# Patient Record
Sex: FEMALE | Race: WHITE | Employment: UNEMPLOYED | URBAN - NONMETROPOLITAN AREA
[De-identification: names, ages, dates, MRNs, and addresses within clinical notes are randomized per-mention and may not be internally consistent; named-entity substitution may affect disease eponyms.]

---

## 2018-04-06 ENCOUNTER — NURSE TRIAGE (OUTPATIENT)
Dept: ADMINISTRATIVE | Age: 48
End: 2018-04-06

## 2018-04-06 ENCOUNTER — HOSPITAL ENCOUNTER (EMERGENCY)
Age: 48
Discharge: HOME | End: 2018-04-06
Payer: MEDICAID

## 2018-04-06 VITALS
HEIGHT: 60 IN | DIASTOLIC BLOOD PRESSURE: 80 MMHG | SYSTOLIC BLOOD PRESSURE: 143 MMHG | HEART RATE: 82 BPM | OXYGEN SATURATION: 98 % | BODY MASS INDEX: 53.01 KG/M2 | RESPIRATION RATE: 18 BRPM | TEMPERATURE: 98.3 F | WEIGHT: 270 LBS

## 2018-04-06 DIAGNOSIS — S05.8X1A ABRASION OF SCLERA OF RIGHT EYE, INITIAL ENCOUNTER: Primary | ICD-10-CM

## 2018-04-06 PROCEDURE — 99202 OFFICE O/P NEW SF 15 MIN: CPT | Performed by: NURSE PRACTITIONER

## 2018-04-06 PROCEDURE — 99212 OFFICE O/P EST SF 10 MIN: CPT

## 2018-04-06 RX ORDER — ERYTHROMYCIN 5 MG/G
0.5 OINTMENT OPHTHALMIC 3 TIMES DAILY
Qty: 1 TUBE | Refills: 0 | Status: SHIPPED | OUTPATIENT
Start: 2018-04-06 | End: 2018-04-13

## 2018-04-06 ASSESSMENT — ENCOUNTER SYMPTOMS
NAUSEA: 0
EYE REDNESS: 1
BLURRED VISION: 1
PHOTOPHOBIA: 0
APNEA: 0
EYE DISCHARGE: 1
EYE WATERING: 1
EYE INFLAMMATION: 0
COUGH: 0
CRUSTING: 0
EYE PAIN: 1
DOUBLE VISION: 0
WHEEZING: 0
VOMITING: 0
PERI-ORBITAL EDEMA: 1
SHORTNESS OF BREATH: 0
BLIND SPOTS: 0
EYE ITCHING: 0
CHEST TIGHTNESS: 0
CHOKING: 0
STRIDOR: 0

## 2018-04-06 ASSESSMENT — PAIN SCALES - GENERAL: PAINLEVEL_OUTOF10: 9

## 2018-04-06 ASSESSMENT — PAIN DESCRIPTION - LOCATION: LOCATION: EYE

## 2018-04-06 ASSESSMENT — PAIN DESCRIPTION - ORIENTATION: ORIENTATION: RIGHT

## 2018-04-13 ENCOUNTER — HOSPITAL ENCOUNTER (EMERGENCY)
Age: 48
Discharge: HOME | End: 2018-04-13
Payer: MEDICAID

## 2018-04-13 VITALS
SYSTOLIC BLOOD PRESSURE: 167 MMHG | RESPIRATION RATE: 18 BRPM | BODY MASS INDEX: 53.01 KG/M2 | WEIGHT: 270 LBS | OXYGEN SATURATION: 97 % | DIASTOLIC BLOOD PRESSURE: 90 MMHG | HEART RATE: 72 BPM | TEMPERATURE: 98.3 F | HEIGHT: 60 IN

## 2018-04-13 DIAGNOSIS — J30.9 ALLERGIC SINUSITIS: Primary | ICD-10-CM

## 2018-04-13 PROCEDURE — 99213 OFFICE O/P EST LOW 20 MIN: CPT

## 2018-04-13 PROCEDURE — 99213 OFFICE O/P EST LOW 20 MIN: CPT | Performed by: NURSE PRACTITIONER

## 2018-04-13 RX ORDER — AZITHROMYCIN 250 MG/1
TABLET, FILM COATED ORAL
Qty: 6 TABLET | Refills: 0 | Status: SHIPPED | OUTPATIENT
Start: 2018-04-13 | End: 2018-06-09 | Stop reason: ALTCHOICE

## 2018-04-13 RX ORDER — PREDNISONE 10 MG/1
TABLET ORAL
Qty: 30 TABLET | Refills: 0 | Status: SHIPPED | OUTPATIENT
Start: 2018-04-13 | End: 2018-04-23

## 2018-04-13 ASSESSMENT — PAIN DESCRIPTION - FREQUENCY: FREQUENCY: CONTINUOUS

## 2018-04-13 ASSESSMENT — PAIN DESCRIPTION - ORIENTATION: ORIENTATION: LEFT

## 2018-04-13 ASSESSMENT — PAIN SCALES - GENERAL: PAINLEVEL_OUTOF10: 2

## 2018-04-13 ASSESSMENT — ENCOUNTER SYMPTOMS: COUGH: 0

## 2018-04-13 ASSESSMENT — PAIN DESCRIPTION - PAIN TYPE: TYPE: ACUTE PAIN

## 2018-04-13 ASSESSMENT — PAIN DESCRIPTION - DESCRIPTORS: DESCRIPTORS: THROBBING

## 2018-06-09 ENCOUNTER — HOSPITAL ENCOUNTER (EMERGENCY)
Age: 48
Discharge: HOME | End: 2018-06-09
Attending: EMERGENCY MEDICINE
Payer: MEDICAID

## 2018-06-09 VITALS
BODY MASS INDEX: 54.43 KG/M2 | HEART RATE: 81 BPM | DIASTOLIC BLOOD PRESSURE: 71 MMHG | TEMPERATURE: 98.4 F | RESPIRATION RATE: 18 BRPM | OXYGEN SATURATION: 95 % | HEIGHT: 59 IN | SYSTOLIC BLOOD PRESSURE: 139 MMHG | WEIGHT: 270 LBS

## 2018-06-09 DIAGNOSIS — J01.00 ACUTE MAXILLARY SINUSITIS, RECURRENCE NOT SPECIFIED: Primary | ICD-10-CM

## 2018-06-09 DIAGNOSIS — J04.0 ACUTE LARYNGITIS: ICD-10-CM

## 2018-06-09 DIAGNOSIS — J20.9 ACUTE BRONCHITIS DUE TO INFECTION: ICD-10-CM

## 2018-06-09 PROCEDURE — 99213 OFFICE O/P EST LOW 20 MIN: CPT | Performed by: EMERGENCY MEDICINE

## 2018-06-09 PROCEDURE — 99212 OFFICE O/P EST SF 10 MIN: CPT

## 2018-06-09 RX ORDER — DOXYCYCLINE HYCLATE 100 MG
100 TABLET ORAL 2 TIMES DAILY
Qty: 14 TABLET | Refills: 0 | Status: SHIPPED | OUTPATIENT
Start: 2018-06-09 | End: 2018-06-16

## 2018-06-09 RX ORDER — PROMETHAZINE HYDROCHLORIDE AND CODEINE PHOSPHATE 6.25; 1 MG/5ML; MG/5ML
5 SYRUP ORAL 4 TIMES DAILY PRN
Qty: 120 ML | Refills: 0 | Status: SHIPPED | OUTPATIENT
Start: 2018-06-09 | End: 2018-06-14

## 2018-06-09 ASSESSMENT — ENCOUNTER SYMPTOMS
NAUSEA: 0
CONSTIPATION: 0
SINUS PAIN: 1
SORE THROAT: 1
SHORTNESS OF BREATH: 0
VOMITING: 0
COUGH: 1
TROUBLE SWALLOWING: 0
RHINORRHEA: 1
ABDOMINAL PAIN: 0
BACK PAIN: 0
EYE PAIN: 0
SINUS PRESSURE: 1
WHEEZING: 0
BLOOD IN STOOL: 0
EYE DISCHARGE: 0
STRIDOR: 0
EYE REDNESS: 0
FACIAL SWELLING: 0
DIARRHEA: 0
CHOKING: 0
VOICE CHANGE: 1

## 2018-08-07 ENCOUNTER — HOSPITAL ENCOUNTER (OUTPATIENT)
Age: 48
Discharge: HOME | End: 2018-08-07
Payer: MEDICAID

## 2018-08-07 ENCOUNTER — HOSPITAL ENCOUNTER (OUTPATIENT)
Dept: GENERAL RADIOLOGY | Age: 48
Discharge: HOME | End: 2018-08-07
Payer: MEDICAID

## 2018-08-07 DIAGNOSIS — M54.5 LOW BACK PAIN, UNSPECIFIED BACK PAIN LATERALITY, UNSPECIFIED CHRONICITY, WITH SCIATICA PRESENCE UNSPECIFIED: ICD-10-CM

## 2018-08-07 PROCEDURE — 72114 X-RAY EXAM L-S SPINE BENDING: CPT

## 2018-09-05 ENCOUNTER — HOSPITAL ENCOUNTER (OUTPATIENT)
Dept: CT IMAGING | Age: 48
Discharge: HOME | End: 2018-09-05
Payer: MEDICAID

## 2018-09-05 DIAGNOSIS — M43.00 SPONDYLOLYSIS: ICD-10-CM

## 2018-09-05 PROCEDURE — 72131 CT LUMBAR SPINE W/O DYE: CPT

## 2018-09-28 ENCOUNTER — OFFICE VISIT (OUTPATIENT)
Dept: BARIATRICS/WEIGHT MGMT | Age: 48
End: 2018-09-28
Payer: MEDICAID

## 2018-09-28 VITALS
BODY MASS INDEX: 53.4 KG/M2 | HEART RATE: 68 BPM | HEIGHT: 60 IN | SYSTOLIC BLOOD PRESSURE: 142 MMHG | TEMPERATURE: 98.8 F | DIASTOLIC BLOOD PRESSURE: 80 MMHG | WEIGHT: 272 LBS

## 2018-09-28 DIAGNOSIS — E78.00 HYPERCHOLESTEREMIA: ICD-10-CM

## 2018-09-28 DIAGNOSIS — M54.50 CHRONIC BILATERAL LOW BACK PAIN WITHOUT SCIATICA: ICD-10-CM

## 2018-09-28 DIAGNOSIS — K50.00 CROHN'S DISEASE OF SMALL INTESTINE WITHOUT COMPLICATION (HCC): ICD-10-CM

## 2018-09-28 DIAGNOSIS — G89.29 CHRONIC BILATERAL LOW BACK PAIN WITHOUT SCIATICA: ICD-10-CM

## 2018-09-28 DIAGNOSIS — E66.01 MORBID OBESITY (HCC): Primary | ICD-10-CM

## 2018-09-28 DIAGNOSIS — G89.29 CHRONIC NECK PAIN: ICD-10-CM

## 2018-09-28 DIAGNOSIS — M54.2 CHRONIC NECK PAIN: ICD-10-CM

## 2018-09-28 PROCEDURE — 1036F TOBACCO NON-USER: CPT | Performed by: SURGERY

## 2018-09-28 PROCEDURE — G8427 DOCREV CUR MEDS BY ELIG CLIN: HCPCS | Performed by: SURGERY

## 2018-09-28 PROCEDURE — 99203 OFFICE O/P NEW LOW 30 MIN: CPT | Performed by: SURGERY

## 2018-09-28 PROCEDURE — G8417 CALC BMI ABV UP PARAM F/U: HCPCS | Performed by: SURGERY

## 2018-09-28 RX ORDER — ATORVASTATIN CALCIUM 40 MG/1
40 TABLET, FILM COATED ORAL NIGHTLY
COMMUNITY
End: 2020-06-16 | Stop reason: ALTCHOICE

## 2018-09-28 ASSESSMENT — ENCOUNTER SYMPTOMS
RESPIRATORY NEGATIVE: 1
GASTROINTESTINAL NEGATIVE: 1
ALLERGIC/IMMUNOLOGIC NEGATIVE: 1
EYES NEGATIVE: 1
BACK PAIN: 1

## 2018-09-28 NOTE — PROGRESS NOTES
Subjective:     Patient ID: Alejandra Contreras is a 57 y.o. female    Chief Complaint   Patient presents with    Bariatric, Initial Visit     Patient int. in surgery - 6 month requirement   current wt:  272lb       HPI    Review of Systems   Neurological: Negative for dizziness, light-headedness and headaches. BP (!) 142/80 (Site: Right Lower Arm, Position: Sitting, Cuff Size: Large Adult)   Pulse 68   Temp 98.8 °F (37.1 °C) (Oral)   Ht 5' (1.524 m)   Wt 272 lb (123.4 kg)   BMI 53.12 kg/m²     Body mass index is 53.12 kg/m².     Objective:   Physical Exam    CBC   No results found for: WBC, RBC, HGB, HCT, MCV, MCH, MCHC, RDW, PLT, MPV, RBCMORP, SEGSPCT, LABLYMP, MONOPCT, LABEOS, BASO, NRBC, ANISOCYTOSIS, SEGSABS, LYMPHSABS, MONOSABS, EOSABS, BASOSABS     BMP/CMP   No results found for: GLUCOSE, CREATININE, BUN, NA, K, CL, CO2, CALCIUM, AST, ALKPHOS, PROT, LABALBU, BILITOT, ALT     PREALBUMIN   No results found for: PREALBUMIN     VITAMIN B12   No results found for: [id number]     24 HOUR URINE CALCIUM   No results found for: Arely Rizvi, CALCIUMUR     VITAMIN D   No results found for: VITD25     VITAMIN B1/ THIAMINE   No results found for: [id number]     RBC FOLATE   No results found for: FOLATE     LIPID SCREEN (FASTING)   No results found for: CHOL, TRIG, HDL, LDLCALC,     HGA1C (T2DM ONLY)   No results found for: LABA1C, AVGG     TSH   No results found for: TSH     IRON   No results found for: IRON     IONIZED CALCIUM   No results found for: Shanda Degroot     Assessment:         Plan:

## 2018-09-28 NOTE — PROGRESS NOTES
Mouth/Throat: Oropharynx is clear and moist and mucous membranes are normal. No oropharyngeal exudate. Eyes: Pupils are equal, round, and reactive to light. Conjunctivae and EOM are normal. Right eye exhibits no discharge. Left eye exhibits no discharge. No scleral icterus. Neck: Trachea normal, normal range of motion, full passive range of motion without pain and phonation normal. Neck supple. No JVD present. Cardiovascular: Normal rate, regular rhythm, S1 normal, S2 normal and normal heart sounds. Pulmonary/Chest: Effort normal and breath sounds normal. No respiratory distress. She has no decreased breath sounds. She has no wheezes. She has no rhonchi. She has no rales. She exhibits no tenderness and no deformity. Abdominal: Soft. Bowel sounds are normal. She exhibits no distension, no abdominal bruit and no mass. There is no hepatosplenomegaly. There is no tenderness. There is no rigidity, no rebound and no guarding. No hernia. Hernia confirmed negative in the ventral area. Musculoskeletal: Normal range of motion. She exhibits no edema or tenderness. Neurological: She is alert and oriented to person, place, and time. She has normal strength. No cranial nerve deficit or sensory deficit. Skin: Skin is warm and dry. No rash noted. She is not diaphoretic. No erythema. No pallor. Psychiatric: She has a normal mood and affect. Her speech is normal and behavior is normal. Judgment and thought content normal. Cognition and memory are normal.   Vitals reviewed.     CBC   No results found for: WBC, RBC, HGB, HCT, MCV, MCH, MCHC, RDW, PLT, MPV, RBCMORP, SEGSPCT, LABLYMP, MONOPCT, LABEOS, BASO, NRBC, ANISOCYTOSIS, SEGSABS, LYMPHSABS, MONOSABS, EOSABS, BASOSABS     BMP/CMP   No results found for: GLUCOSE, CREATININE, BUN, NA, K, CL, CO2, CALCIUM, AST, ALKPHOS, PROT, LABALBU, BILITOT, ALT     PREALBUMIN   No results found for: PREALBUMIN     VITAMIN B12   No results found for: [id number]     24 HOUR URINE effects/complications of weight loss medications. All questions answered. Patient states that if she is not able to lose enough adequate excess body weight with medical management only then she would be like to proceed with surgical intervention such as sleeve gastrectomy/gastric bypass for further weight loss. More than 30 minutes spent with patient today. Greater than 50% of the time was involved counseling, educaton and coordinating care.

## 2018-10-19 ENCOUNTER — OFFICE VISIT (OUTPATIENT)
Dept: BARIATRICS/WEIGHT MGMT | Age: 48
End: 2018-10-19

## 2018-10-19 DIAGNOSIS — Z13.21 MALNUTRITION SCREEN: ICD-10-CM

## 2018-10-19 DIAGNOSIS — E66.01 MORBID OBESITY WITH BMI OF 50.0-59.9, ADULT (HCC): Primary | ICD-10-CM

## 2018-10-19 DIAGNOSIS — Z01.818 PRE-OP TESTING: ICD-10-CM

## 2018-10-19 DIAGNOSIS — E78.5 HYPERLIPIDEMIA, UNSPECIFIED HYPERLIPIDEMIA TYPE: ICD-10-CM

## 2018-10-19 DIAGNOSIS — E66.01 MORBID OBESITY WITH BMI OF 50.0-59.9, ADULT (HCC): ICD-10-CM

## 2018-10-19 NOTE — PROGRESS NOTES
milk (4-5 scoops at a time)  Snack: no. - pt sometimes may have a snack  Dinner: yes. 5p - homemade chicken and dumplings, beanless chilim cooks meats  Snack: yes. \"anything that I want to get my hands on I am eating\"- may eat a whole box of cereal in one sitting so tries not to buy this  Drinks throughout the day: Nestle Quick 3-4 16 oz glasses a day of strawberry milk, water intake varied, regular pop when eating out, no coffee or tea    Do you drink alcohol? On occasion. Patient does not meet the criteria for binge eating disorder. Patient does have grazing. Patient does not have night eating. Patient does have a history of emotional eating or eating out of boredom. It does take an unusually large amount of food for the patient to feel comfortably full. Patient describes self as slow eater      Patient describes level of activity as sedentary- takes father to store every evening around [address] [employer] [address] [employer] for ~ 30 minutes. Does have a treadmill at house  Patient will plan to attend Fitness Orientation on: October 31st    Assessment  Nutritional Needs: [place] = 1801 kcal x 1.2 (activity factor)= 2161 kcal - 500-1000 calories/day  (for 1-2 lb weight loss/week)= 4768-8505 calories per day  Food recall reveals larger portions at meals, increased snacks later at nite and increased simple sugars from larger intake of Nesquick Milk daily. Pt recognizes often turns to food when stressed with taking care of father. PES Statement:  Overweight/Obesity related to lack of exercise, sedentary lifestyle, unhealthy eating habits, and unsuccessful diet attempts as evidenced by BMI   Surgery  Surgical procedure desired: gastric sleeve  Patient's greatest concern about having surgery is: Pt is without concerns. Patient describes the motivation for weight loss surgery to be: \"my health\".     The expectations following the surgery were reviewed in detail with patient today and patient made aware will require home.- Start chair exercises at least 20 minutes a day  6. Initial weight loss goal of 3-5% is recommended over 6 month period. This is a minimum of: 8-14 lbs from your weight when initially met with physician of: 272 lbs.          -Followup visit: 4 weeks with dietitian.    Total time involved in direct patient education: 45 minutes    [address], SYED  Dietitian- [facility]

## 2018-11-19 ENCOUNTER — HOSPITAL ENCOUNTER (OUTPATIENT)
Age: 48
Discharge: HOME | End: 2018-11-19
Payer: MEDICAID

## 2018-11-19 DIAGNOSIS — E78.5 HYPERLIPIDEMIA, UNSPECIFIED HYPERLIPIDEMIA TYPE: ICD-10-CM

## 2018-11-19 DIAGNOSIS — Z01.818 PRE-OP TESTING: ICD-10-CM

## 2018-11-19 DIAGNOSIS — Z13.21 MALNUTRITION SCREEN: ICD-10-CM

## 2018-11-19 DIAGNOSIS — E66.01 MORBID OBESITY WITH BMI OF 50.0-59.9, ADULT (HCC): ICD-10-CM

## 2018-11-19 LAB
ALBUMIN SERPL-MCNC: 4.4 G/DL (ref 3.5–5.1)
ALP BLD-CCNC: 92 U/L (ref 38–126)
ALT SERPL-CCNC: 31 U/L (ref 11–66)
ANION GAP SERPL CALCULATED.3IONS-SCNC: 19 MEQ/L (ref 8–16)
AST SERPL-CCNC: 22 U/L (ref 5–40)
AVERAGE GLUCOSE: 117 MG/DL (ref 70–126)
BASOPHILS # BLD: 0.4 %
BASOPHILS ABSOLUTE: 0 THOU/MM3 (ref 0–0.1)
BILIRUB SERPL-MCNC: 0.5 MG/DL (ref 0.3–1.2)
BUN BLDV-MCNC: 14 MG/DL (ref 7–22)
CALCIUM SERPL-MCNC: 9.5 MG/DL (ref 8.5–10.5)
CHLORIDE BLD-SCNC: 103 MEQ/L (ref 98–111)
CHOLESTEROL, TOTAL: 167 MG/DL (ref 100–199)
CO2: 21 MEQ/L (ref 23–33)
CREAT SERPL-MCNC: 0.5 MG/DL (ref 0.4–1.2)
EKG ATRIAL RATE: 71 BPM
EKG P AXIS: 13 DEGREES
EKG P-R INTERVAL: 178 MS
EKG Q-T INTERVAL: 414 MS
EKG QRS DURATION: 102 MS
EKG QTC CALCULATION (BAZETT): 449 MS
EKG R AXIS: 63 DEGREES
EKG T AXIS: 41 DEGREES
EKG VENTRICULAR RATE: 71 BPM
EOSINOPHIL # BLD: 1.7 %
EOSINOPHILS ABSOLUTE: 0.1 THOU/MM3 (ref 0–0.4)
ERYTHROCYTE [DISTWIDTH] IN BLOOD BY AUTOMATED COUNT: 13.6 % (ref 11.5–14.5)
ERYTHROCYTE [DISTWIDTH] IN BLOOD BY AUTOMATED COUNT: 45.5 FL (ref 35–45)
FERRITIN: 236 NG/ML (ref 10–291)
FOLATE: > 20 NG/ML (ref 4.8–24.2)
GFR SERPL CREATININE-BSD FRML MDRD: > 90 ML/MIN/1.73M2
GLUCOSE BLD-MCNC: 108 MG/DL (ref 70–108)
HBA1C MFR BLD: 5.9 % (ref 4.4–6.4)
HCT VFR BLD CALC: 39.9 % (ref 37–47)
HDLC SERPL-MCNC: 44 MG/DL
HEMOGLOBIN: 12.8 GM/DL (ref 12–16)
IMMATURE GRANS (ABS): 0.02 THOU/MM3 (ref 0–0.07)
IMMATURE GRANULOCYTES: 0.3 %
INR BLD: 0.93 (ref 0.85–1.13)
IRON: 69 UG/DL (ref 50–170)
LDL CHOLESTEROL CALCULATED: 82 MG/DL
LYMPHOCYTES # BLD: 34.9 %
LYMPHOCYTES ABSOLUTE: 2.7 THOU/MM3 (ref 1–4.8)
MCH RBC QN AUTO: 29 PG (ref 26–33)
MCHC RBC AUTO-ENTMCNC: 32.1 GM/DL (ref 32.2–35.5)
MCV RBC AUTO: 90.5 FL (ref 81–99)
MONOCYTES # BLD: 6.1 %
MONOCYTES ABSOLUTE: 0.5 THOU/MM3 (ref 0.4–1.3)
NUCLEATED RED BLOOD CELLS: 0 /100 WBC
PLATELET # BLD: 298 THOU/MM3 (ref 130–400)
PMV BLD AUTO: 9.1 FL (ref 9.4–12.4)
POTASSIUM SERPL-SCNC: 4.2 MEQ/L (ref 3.5–5.2)
PREALBUMIN: 26 MG/DL (ref 20–40)
PTH INTACT: 46.6 PG/ML (ref 15–65)
RBC # BLD: 4.41 MILL/MM3 (ref 4.2–5.4)
SEG NEUTROPHILS: 56.6 %
SEGMENTED NEUTROPHILS ABSOLUTE COUNT: 4.4 THOU/MM3 (ref 1.8–7.7)
SODIUM BLD-SCNC: 143 MEQ/L (ref 135–145)
TOTAL IRON BINDING CAPACITY: 302 UG/DL (ref 171–450)
TOTAL PROTEIN: 7.7 G/DL (ref 6.1–8)
TRIGL SERPL-MCNC: 207 MG/DL (ref 0–199)
TSH SERPL DL<=0.05 MIU/L-ACNC: 3.56 UIU/ML (ref 0.4–4.2)
VITAMIN B-12: 638 PG/ML (ref 211–911)
VITAMIN D 25-HYDROXY: 28 NG/ML (ref 30–100)
WBC # BLD: 7.7 THOU/MM3 (ref 4.8–10.8)

## 2018-11-19 PROCEDURE — G0480 DRUG TEST DEF 1-7 CLASSES: HCPCS

## 2018-11-19 PROCEDURE — 82746 ASSAY OF FOLIC ACID SERUM: CPT

## 2018-11-19 PROCEDURE — 84425 ASSAY OF VITAMIN B-1: CPT

## 2018-11-19 PROCEDURE — 80061 LIPID PANEL: CPT

## 2018-11-19 PROCEDURE — 80307 DRUG TEST PRSMV CHEM ANLYZR: CPT

## 2018-11-19 PROCEDURE — 83540 ASSAY OF IRON: CPT

## 2018-11-19 PROCEDURE — 36415 COLL VENOUS BLD VENIPUNCTURE: CPT

## 2018-11-19 PROCEDURE — 84590 ASSAY OF VITAMIN A: CPT

## 2018-11-19 PROCEDURE — 85025 COMPLETE CBC W/AUTO DIFF WBC: CPT

## 2018-11-19 PROCEDURE — 85610 PROTHROMBIN TIME: CPT

## 2018-11-19 PROCEDURE — 83036 HEMOGLOBIN GLYCOSYLATED A1C: CPT

## 2018-11-19 PROCEDURE — 82306 VITAMIN D 25 HYDROXY: CPT

## 2018-11-19 PROCEDURE — 93005 ELECTROCARDIOGRAM TRACING: CPT

## 2018-11-19 PROCEDURE — 84443 ASSAY THYROID STIM HORMONE: CPT

## 2018-11-19 PROCEDURE — 82728 ASSAY OF FERRITIN: CPT

## 2018-11-19 PROCEDURE — 84134 ASSAY OF PREALBUMIN: CPT

## 2018-11-19 PROCEDURE — 83970 ASSAY OF PARATHORMONE: CPT

## 2018-11-19 PROCEDURE — 80053 COMPREHEN METABOLIC PANEL: CPT

## 2018-11-19 PROCEDURE — 82607 VITAMIN B-12: CPT

## 2018-11-19 PROCEDURE — 83550 IRON BINDING TEST: CPT

## 2018-11-20 ENCOUNTER — OFFICE VISIT (OUTPATIENT)
Dept: BARIATRICS/WEIGHT MGMT | Age: 48
End: 2018-11-20

## 2018-11-20 ENCOUNTER — OFFICE VISIT (OUTPATIENT)
Dept: BARIATRICS/WEIGHT MGMT | Age: 48
End: 2018-11-20
Payer: MEDICAID

## 2018-11-20 VITALS
TEMPERATURE: 98.5 F | HEART RATE: 68 BPM | BODY MASS INDEX: 53.79 KG/M2 | DIASTOLIC BLOOD PRESSURE: 88 MMHG | SYSTOLIC BLOOD PRESSURE: 135 MMHG | RESPIRATION RATE: 18 BRPM | HEIGHT: 60 IN | WEIGHT: 274 LBS

## 2018-11-20 VITALS — BODY MASS INDEX: 53.91 KG/M2 | HEIGHT: 60 IN | WEIGHT: 274.6 LBS

## 2018-11-20 DIAGNOSIS — E55.9 VITAMIN D DEFICIENCY: ICD-10-CM

## 2018-11-20 DIAGNOSIS — E66.01 MORBID OBESITY WITH BMI OF 50.0-59.9, ADULT (HCC): Primary | ICD-10-CM

## 2018-11-20 DIAGNOSIS — K50.919 CROHN'S DISEASE WITH COMPLICATION, UNSPECIFIED GASTROINTESTINAL TRACT LOCATION (HCC): ICD-10-CM

## 2018-11-20 DIAGNOSIS — E78.5 HYPERLIPIDEMIA, UNSPECIFIED HYPERLIPIDEMIA TYPE: ICD-10-CM

## 2018-11-20 PROCEDURE — 1036F TOBACCO NON-USER: CPT | Performed by: PHYSICIAN ASSISTANT

## 2018-11-20 PROCEDURE — 99214 OFFICE O/P EST MOD 30 MIN: CPT | Performed by: PHYSICIAN ASSISTANT

## 2018-11-20 PROCEDURE — G8417 CALC BMI ABV UP PARAM F/U: HCPCS | Performed by: PHYSICIAN ASSISTANT

## 2018-11-20 PROCEDURE — 93010 ELECTROCARDIOGRAM REPORT: CPT | Performed by: INTERNAL MEDICINE

## 2018-11-20 PROCEDURE — G8427 DOCREV CUR MEDS BY ELIG CLIN: HCPCS | Performed by: PHYSICIAN ASSISTANT

## 2018-11-20 PROCEDURE — G8484 FLU IMMUNIZE NO ADMIN: HCPCS | Performed by: PHYSICIAN ASSISTANT

## 2018-11-20 NOTE — PROGRESS NOTES
Assessment: Patient is a 57 y.o. female seen for   month one  follow up MNT visit for  pre op bariatric surgery desires sleeve    Vitals from current and previous visits:  Vitals 54/33/9744   SYSTOLIC    DIASTOLIC    Site    Position    Cuff Size    Pulse    Temp    Resp    Weight 274 lb 9.6 oz   Height 5' 0\"   BMI (wt*703/ht~2) 53.62 kg/m2   Pain Level        Initial weight at start of Weight Management Program was: 272 lbs     Celester Pitcher gained 2 lbs since initial MD visit  -Weight goal: lose weight.     -Nutritionally relevant labs:   Vitamin D-28   TG- elevated. Awaiting result of some lab work yet pending  Lab Results   Component Value Date/Time    LABA1C 5.9 11/19/2018 01:47 PM    GLUCOSE 108 11/19/2018 01:46 PM    CHOL 167 11/19/2018 01:46 PM    HDL 44 11/19/2018 01:46 PM    LDLCALC 82 11/19/2018 01:46 PM    TRIG 207 (H) 11/19/2018 01:46 PM                  Lab Results   Component Value Date    VITD25 28 11/19/2018       - Is patient taking daily Multivitamin:  Is not taking a Multivitamin. Recommend pt start 1,000-2,000IU's Vitamin D3 daily due to Vitamin D level < 30.     -Food Recall:   Pt did not have food journal at office visit today. Verbal recall taken  Breakfast:  Pt is not eating breakfast.  Lunch: states lunch is hit or miss. Dinner: 4:30p-6:00pm - meats, potatoes, vegetables. Snacks: increased snacking later in evening - increased popcorn with butter salt. States recognizes nite time eating later at nite is an issue. Main Beverages: Nestle Quick Powder down to one 8 oz glass twice a day of strawberry mil instead of 32 oz /day, water intake varied and states needs to improve, regular pop when eating out, no coffee or tea  -Impression of Dietary Intake: skipping meals, low water intake. - Pt  is working towards avoiding high fat/high sugar foods. Pt is working towards   including protein at meals and snacks. Exercise:  Patient has not attended Fitness Orientation yet.   Pt to contact Rosie Leiva to

## 2018-11-23 LAB
RETINOL (VITAMIN A): NORMAL
URINE DRUG PROFILE: NORMAL
VITAMIN B1 WHOLE BLOOD: 120 NMOL/L (ref 70–180)

## 2018-11-25 LAB — NICOTINE AND METABOLITES: NORMAL

## 2018-12-18 ENCOUNTER — OFFICE VISIT (OUTPATIENT)
Dept: BARIATRICS/WEIGHT MGMT | Age: 48
End: 2018-12-18
Payer: MEDICAID

## 2018-12-18 ENCOUNTER — OFFICE VISIT (OUTPATIENT)
Dept: BARIATRICS/WEIGHT MGMT | Age: 48
End: 2018-12-18

## 2018-12-18 VITALS
WEIGHT: 275.2 LBS | DIASTOLIC BLOOD PRESSURE: 84 MMHG | HEART RATE: 72 BPM | SYSTOLIC BLOOD PRESSURE: 131 MMHG | RESPIRATION RATE: 18 BRPM | HEIGHT: 60 IN | BODY MASS INDEX: 54.03 KG/M2 | TEMPERATURE: 98 F

## 2018-12-18 DIAGNOSIS — E66.01 MORBID OBESITY WITH BMI OF 50.0-59.9, ADULT (HCC): Primary | ICD-10-CM

## 2018-12-18 DIAGNOSIS — E55.9 VITAMIN D DEFICIENCY: ICD-10-CM

## 2018-12-18 DIAGNOSIS — K50.919 CROHN'S DISEASE WITH COMPLICATION, UNSPECIFIED GASTROINTESTINAL TRACT LOCATION (HCC): ICD-10-CM

## 2018-12-18 DIAGNOSIS — E78.5 HYPERLIPIDEMIA, UNSPECIFIED HYPERLIPIDEMIA TYPE: ICD-10-CM

## 2018-12-18 PROCEDURE — G8427 DOCREV CUR MEDS BY ELIG CLIN: HCPCS | Performed by: PHYSICIAN ASSISTANT

## 2018-12-18 PROCEDURE — 1036F TOBACCO NON-USER: CPT | Performed by: PHYSICIAN ASSISTANT

## 2018-12-18 PROCEDURE — G8417 CALC BMI ABV UP PARAM F/U: HCPCS | Performed by: PHYSICIAN ASSISTANT

## 2018-12-18 PROCEDURE — G8484 FLU IMMUNIZE NO ADMIN: HCPCS | Performed by: PHYSICIAN ASSISTANT

## 2018-12-18 PROCEDURE — 99213 OFFICE O/P EST LOW 20 MIN: CPT | Performed by: PHYSICIAN ASSISTANT

## 2018-12-18 NOTE — PATIENT INSTRUCTIONS
Goals: 1. Exercise goal:   Continue Chair exercises at least 20 minutes every day. Make individual appointment with Sebastian Melchor  2. Nutrition Goal:I will use my food journal to record meal times, serving sizes and bring back to next dietitian visit  3. Water goal:  Aim for 3 1/2 - 4 of your 18 oz glasses of water every day! End goal is to cut out strawberry milk! 4.   Consider discussion with family doctor and anti-depressant medications

## 2019-01-05 ENCOUNTER — HOSPITAL ENCOUNTER (EMERGENCY)
Age: 49
Discharge: HOME | End: 2019-01-05
Payer: MEDICAID

## 2019-01-05 VITALS
BODY MASS INDEX: 52.73 KG/M2 | WEIGHT: 270 LBS | HEART RATE: 82 BPM | SYSTOLIC BLOOD PRESSURE: 143 MMHG | DIASTOLIC BLOOD PRESSURE: 86 MMHG | TEMPERATURE: 98.1 F | RESPIRATION RATE: 16 BRPM | OXYGEN SATURATION: 94 %

## 2019-01-05 DIAGNOSIS — J01.00 ACUTE MAXILLARY SINUSITIS, RECURRENCE NOT SPECIFIED: Primary | ICD-10-CM

## 2019-01-05 DIAGNOSIS — J40 BRONCHITIS: ICD-10-CM

## 2019-01-05 PROCEDURE — 99213 OFFICE O/P EST LOW 20 MIN: CPT | Performed by: NURSE PRACTITIONER

## 2019-01-05 PROCEDURE — 99212 OFFICE O/P EST SF 10 MIN: CPT

## 2019-01-05 RX ORDER — PREDNISONE 20 MG/1
40 TABLET ORAL DAILY
Qty: 14 TABLET | Refills: 0 | Status: SHIPPED | OUTPATIENT
Start: 2019-01-05 | End: 2019-01-12

## 2019-01-05 RX ORDER — ACETAMINOPHEN 500 MG
500 TABLET ORAL EVERY 6 HOURS PRN
COMMUNITY
End: 2019-06-21

## 2019-01-05 RX ORDER — DOXYCYCLINE HYCLATE 100 MG
100 TABLET ORAL 2 TIMES DAILY
Qty: 14 TABLET | Refills: 0 | Status: SHIPPED | OUTPATIENT
Start: 2019-01-05 | End: 2019-01-12

## 2019-01-05 ASSESSMENT — ENCOUNTER SYMPTOMS
SINUS PRESSURE: 1
ABDOMINAL PAIN: 0
CHEST TIGHTNESS: 0
SHORTNESS OF BREATH: 0
SORE THROAT: 1

## 2019-01-05 ASSESSMENT — PAIN DESCRIPTION - DESCRIPTORS: DESCRIPTORS: ACHING;PRESSURE

## 2019-01-05 ASSESSMENT — PAIN SCALES - GENERAL: PAINLEVEL_OUTOF10: 8

## 2019-01-05 ASSESSMENT — PAIN DESCRIPTION - PAIN TYPE: TYPE: ACUTE PAIN

## 2019-01-05 ASSESSMENT — PAIN DESCRIPTION - ORIENTATION: ORIENTATION: UPPER

## 2019-01-05 ASSESSMENT — PAIN DESCRIPTION - LOCATION: LOCATION: FACE;HEAD

## 2019-01-15 ENCOUNTER — OFFICE VISIT (OUTPATIENT)
Dept: BARIATRICS/WEIGHT MGMT | Age: 49
End: 2019-01-15
Payer: MEDICAID

## 2019-01-15 ENCOUNTER — HOSPITAL ENCOUNTER (OUTPATIENT)
Dept: MRI IMAGING | Age: 49
Discharge: HOME | End: 2019-01-15
Payer: MEDICAID

## 2019-01-15 ENCOUNTER — OFFICE VISIT (OUTPATIENT)
Dept: BARIATRICS/WEIGHT MGMT | Age: 49
End: 2019-01-15

## 2019-01-15 VITALS
BODY MASS INDEX: 54.77 KG/M2 | DIASTOLIC BLOOD PRESSURE: 66 MMHG | WEIGHT: 279 LBS | HEIGHT: 60 IN | SYSTOLIC BLOOD PRESSURE: 122 MMHG | HEART RATE: 72 BPM | TEMPERATURE: 98.4 F

## 2019-01-15 DIAGNOSIS — E78.5 HYPERLIPIDEMIA, UNSPECIFIED HYPERLIPIDEMIA TYPE: ICD-10-CM

## 2019-01-15 DIAGNOSIS — M65.871 OTHER SYNOVITIS AND TENOSYNOVITIS, RIGHT ANKLE AND FOOT: ICD-10-CM

## 2019-01-15 DIAGNOSIS — E66.01 MORBID OBESITY WITH BMI OF 50.0-59.9, ADULT (HCC): Primary | ICD-10-CM

## 2019-01-15 DIAGNOSIS — E55.9 VITAMIN D DEFICIENCY: ICD-10-CM

## 2019-01-15 DIAGNOSIS — M25.371 ANKLE INSTABILITY, RIGHT: ICD-10-CM

## 2019-01-15 DIAGNOSIS — K50.919 CROHN'S DISEASE WITH COMPLICATION, UNSPECIFIED GASTROINTESTINAL TRACT LOCATION (HCC): ICD-10-CM

## 2019-01-15 DIAGNOSIS — M92.61 HAGLUND'S DEFORMITY OF RIGHT HEEL: ICD-10-CM

## 2019-01-15 DIAGNOSIS — M19.90 ARTHRITIS: ICD-10-CM

## 2019-01-15 DIAGNOSIS — M24.071: ICD-10-CM

## 2019-01-15 DIAGNOSIS — R52 PAIN: ICD-10-CM

## 2019-01-15 PROCEDURE — G8484 FLU IMMUNIZE NO ADMIN: HCPCS | Performed by: PHYSICIAN ASSISTANT

## 2019-01-15 PROCEDURE — 73721 MRI JNT OF LWR EXTRE W/O DYE: CPT

## 2019-01-15 PROCEDURE — G8417 CALC BMI ABV UP PARAM F/U: HCPCS | Performed by: PHYSICIAN ASSISTANT

## 2019-01-15 PROCEDURE — 99214 OFFICE O/P EST MOD 30 MIN: CPT | Performed by: PHYSICIAN ASSISTANT

## 2019-01-15 PROCEDURE — G8427 DOCREV CUR MEDS BY ELIG CLIN: HCPCS | Performed by: PHYSICIAN ASSISTANT

## 2019-01-15 PROCEDURE — 1036F TOBACCO NON-USER: CPT | Performed by: PHYSICIAN ASSISTANT

## 2019-01-15 RX ORDER — CHOLECALCIFEROL (VITAMIN D3) 125 MCG
CAPSULE ORAL DAILY
COMMUNITY
End: 2020-08-11

## 2019-02-14 ENCOUNTER — OFFICE VISIT (OUTPATIENT)
Dept: BARIATRICS/WEIGHT MGMT | Age: 49
End: 2019-02-14
Payer: MEDICAID

## 2019-02-14 ENCOUNTER — OFFICE VISIT (OUTPATIENT)
Dept: BARIATRICS/WEIGHT MGMT | Age: 49
End: 2019-02-14

## 2019-02-14 VITALS
HEART RATE: 88 BPM | TEMPERATURE: 97.9 F | DIASTOLIC BLOOD PRESSURE: 70 MMHG | BODY MASS INDEX: 53.67 KG/M2 | HEIGHT: 60 IN | SYSTOLIC BLOOD PRESSURE: 112 MMHG | RESPIRATION RATE: 18 BRPM | WEIGHT: 273.4 LBS

## 2019-02-14 DIAGNOSIS — E66.01 MORBID OBESITY WITH BMI OF 50.0-59.9, ADULT (HCC): Primary | ICD-10-CM

## 2019-02-14 DIAGNOSIS — K50.919 CROHN'S DISEASE WITH COMPLICATION, UNSPECIFIED GASTROINTESTINAL TRACT LOCATION (HCC): ICD-10-CM

## 2019-02-14 DIAGNOSIS — E55.9 VITAMIN D DEFICIENCY: ICD-10-CM

## 2019-02-14 DIAGNOSIS — E78.5 HYPERLIPIDEMIA, UNSPECIFIED HYPERLIPIDEMIA TYPE: ICD-10-CM

## 2019-02-14 PROCEDURE — G8484 FLU IMMUNIZE NO ADMIN: HCPCS | Performed by: PHYSICIAN ASSISTANT

## 2019-02-14 PROCEDURE — G8427 DOCREV CUR MEDS BY ELIG CLIN: HCPCS | Performed by: PHYSICIAN ASSISTANT

## 2019-02-14 PROCEDURE — 99213 OFFICE O/P EST LOW 20 MIN: CPT | Performed by: PHYSICIAN ASSISTANT

## 2019-02-14 PROCEDURE — 1036F TOBACCO NON-USER: CPT | Performed by: PHYSICIAN ASSISTANT

## 2019-02-14 PROCEDURE — G8417 CALC BMI ABV UP PARAM F/U: HCPCS | Performed by: PHYSICIAN ASSISTANT

## 2019-02-18 ENCOUNTER — HOSPITAL ENCOUNTER (OUTPATIENT)
Age: 49
Setting detail: OUTPATIENT SURGERY
Discharge: HOME | End: 2019-02-18
Attending: INTERNAL MEDICINE | Admitting: INTERNAL MEDICINE
Payer: MEDICAID

## 2019-02-18 ENCOUNTER — ANESTHESIA EVENT (OUTPATIENT)
Dept: ENDOSCOPY | Age: 49
End: 2019-02-18
Payer: MEDICAID

## 2019-02-18 ENCOUNTER — ANESTHESIA (OUTPATIENT)
Dept: ENDOSCOPY | Age: 49
End: 2019-02-18
Payer: MEDICAID

## 2019-02-18 VITALS
SYSTOLIC BLOOD PRESSURE: 139 MMHG | OXYGEN SATURATION: 94 % | RESPIRATION RATE: 20 BRPM | HEART RATE: 73 BPM | WEIGHT: 244.8 LBS | HEIGHT: 60 IN | TEMPERATURE: 97 F | DIASTOLIC BLOOD PRESSURE: 77 MMHG | BODY MASS INDEX: 48.06 KG/M2

## 2019-02-18 VITALS
RESPIRATION RATE: 7 BRPM | OXYGEN SATURATION: 97 % | SYSTOLIC BLOOD PRESSURE: 153 MMHG | DIASTOLIC BLOOD PRESSURE: 76 MMHG

## 2019-02-18 PROCEDURE — 88305 TISSUE EXAM BY PATHOLOGIST: CPT

## 2019-02-18 PROCEDURE — 6360000002 HC RX W HCPCS: Performed by: REGISTERED NURSE

## 2019-02-18 PROCEDURE — 2709999900 HC NON-CHARGEABLE SUPPLY: Performed by: INTERNAL MEDICINE

## 2019-02-18 PROCEDURE — 86677 HELICOBACTER PYLORI ANTIBODY: CPT

## 2019-02-18 PROCEDURE — 3700000001 HC ADD 15 MINUTES (ANESTHESIA): Performed by: INTERNAL MEDICINE

## 2019-02-18 PROCEDURE — 2500000003 HC RX 250 WO HCPCS: Performed by: REGISTERED NURSE

## 2019-02-18 PROCEDURE — 7100000000 HC PACU RECOVERY - FIRST 15 MIN: Performed by: INTERNAL MEDICINE

## 2019-02-18 PROCEDURE — 3609012400 HC EGD TRANSORAL BIOPSY SINGLE/MULTIPLE: Performed by: INTERNAL MEDICINE

## 2019-02-18 PROCEDURE — 7100000001 HC PACU RECOVERY - ADDTL 15 MIN: Performed by: INTERNAL MEDICINE

## 2019-02-18 PROCEDURE — 2580000003 HC RX 258: Performed by: INTERNAL MEDICINE

## 2019-02-18 PROCEDURE — 3700000000 HC ANESTHESIA ATTENDED CARE: Performed by: INTERNAL MEDICINE

## 2019-02-18 RX ORDER — LIDOCAINE HYDROCHLORIDE 20 MG/ML
INJECTION, SOLUTION INFILTRATION; PERINEURAL PRN
Status: DISCONTINUED | OUTPATIENT
Start: 2019-02-18 | End: 2019-02-18 | Stop reason: SDUPTHER

## 2019-02-18 RX ORDER — SODIUM CHLORIDE 450 MG/100ML
INJECTION, SOLUTION INTRAVENOUS CONTINUOUS
Status: DISCONTINUED | OUTPATIENT
Start: 2019-02-18 | End: 2019-02-18 | Stop reason: HOSPADM

## 2019-02-18 RX ORDER — FENTANYL CITRATE 50 UG/ML
INJECTION, SOLUTION INTRAMUSCULAR; INTRAVENOUS PRN
Status: DISCONTINUED | OUTPATIENT
Start: 2019-02-18 | End: 2019-02-18 | Stop reason: SDUPTHER

## 2019-02-18 RX ADMIN — SODIUM CHLORIDE: 4.5 INJECTION, SOLUTION INTRAVENOUS at 09:37

## 2019-02-18 RX ADMIN — LIDOCAINE HYDROCHLORIDE 200 MG: 20 INJECTION, SOLUTION INFILTRATION; PERINEURAL at 09:51

## 2019-02-18 RX ADMIN — PROPOFOL 100 MG: 10 INJECTION, EMULSION INTRAVENOUS at 09:51

## 2019-02-18 RX ADMIN — FENTANYL CITRATE 100 MCG: 50 INJECTION INTRAMUSCULAR; INTRAVENOUS at 09:51

## 2019-02-18 ASSESSMENT — PAIN SCALES - GENERAL
PAINLEVEL_OUTOF10: 0
PAINLEVEL_OUTOF10: 0

## 2019-02-18 ASSESSMENT — PAIN - FUNCTIONAL ASSESSMENT: PAIN_FUNCTIONAL_ASSESSMENT: 0-10

## 2019-02-19 LAB — UREASE-CLO-C. PYLORI: NEGATIVE

## 2019-03-14 ENCOUNTER — OFFICE VISIT (OUTPATIENT)
Dept: BARIATRICS/WEIGHT MGMT | Age: 49
End: 2019-03-14
Payer: MEDICAID

## 2019-03-14 VITALS
HEIGHT: 60 IN | RESPIRATION RATE: 18 BRPM | WEIGHT: 272.8 LBS | HEART RATE: 60 BPM | TEMPERATURE: 98.5 F | BODY MASS INDEX: 53.56 KG/M2 | SYSTOLIC BLOOD PRESSURE: 114 MMHG | DIASTOLIC BLOOD PRESSURE: 82 MMHG

## 2019-03-14 DIAGNOSIS — E66.01 MORBID OBESITY WITH BMI OF 50.0-59.9, ADULT (HCC): Primary | ICD-10-CM

## 2019-03-14 DIAGNOSIS — E78.5 HYPERLIPIDEMIA, UNSPECIFIED HYPERLIPIDEMIA TYPE: ICD-10-CM

## 2019-03-14 DIAGNOSIS — K50.919 CROHN'S DISEASE WITH COMPLICATION, UNSPECIFIED GASTROINTESTINAL TRACT LOCATION (HCC): ICD-10-CM

## 2019-03-14 DIAGNOSIS — E55.9 VITAMIN D DEFICIENCY: ICD-10-CM

## 2019-03-14 PROCEDURE — G8484 FLU IMMUNIZE NO ADMIN: HCPCS | Performed by: PHYSICIAN ASSISTANT

## 2019-03-14 PROCEDURE — G8417 CALC BMI ABV UP PARAM F/U: HCPCS | Performed by: PHYSICIAN ASSISTANT

## 2019-03-14 PROCEDURE — 1036F TOBACCO NON-USER: CPT | Performed by: PHYSICIAN ASSISTANT

## 2019-03-14 PROCEDURE — 99213 OFFICE O/P EST LOW 20 MIN: CPT | Performed by: PHYSICIAN ASSISTANT

## 2019-03-14 PROCEDURE — G8427 DOCREV CUR MEDS BY ELIG CLIN: HCPCS | Performed by: PHYSICIAN ASSISTANT

## 2019-03-25 ENCOUNTER — OFFICE VISIT (OUTPATIENT)
Dept: BARIATRICS/WEIGHT MGMT | Age: 49
End: 2019-03-25

## 2019-03-25 VITALS — WEIGHT: 275 LBS | HEIGHT: 60 IN | BODY MASS INDEX: 53.99 KG/M2

## 2019-03-25 DIAGNOSIS — E66.01 MORBID OBESITY WITH BMI OF 50.0-59.9, ADULT (HCC): Primary | ICD-10-CM

## 2019-03-28 ENCOUNTER — HOSPITAL ENCOUNTER (OUTPATIENT)
Dept: GENERAL RADIOLOGY | Age: 49
Discharge: HOME | End: 2019-03-28
Payer: MEDICAID

## 2019-03-28 ENCOUNTER — HOSPITAL ENCOUNTER (OUTPATIENT)
Age: 49
Discharge: HOME | End: 2019-03-28
Payer: MEDICAID

## 2019-03-28 DIAGNOSIS — Z01.811 PRE-OP CHEST EXAM: ICD-10-CM

## 2019-03-28 PROCEDURE — 71046 X-RAY EXAM CHEST 2 VIEWS: CPT

## 2019-04-30 ENCOUNTER — OFFICE VISIT (OUTPATIENT)
Dept: BARIATRICS/WEIGHT MGMT | Age: 49
End: 2019-04-30

## 2019-04-30 ENCOUNTER — OFFICE VISIT (OUTPATIENT)
Dept: BARIATRICS/WEIGHT MGMT | Age: 49
End: 2019-04-30
Payer: MEDICAID

## 2019-04-30 VITALS
SYSTOLIC BLOOD PRESSURE: 120 MMHG | HEART RATE: 88 BPM | WEIGHT: 266.6 LBS | BODY MASS INDEX: 52.34 KG/M2 | TEMPERATURE: 98.4 F | HEIGHT: 60 IN | DIASTOLIC BLOOD PRESSURE: 76 MMHG | RESPIRATION RATE: 18 BRPM

## 2019-04-30 DIAGNOSIS — K50.919 CROHN'S DISEASE WITH COMPLICATION, UNSPECIFIED GASTROINTESTINAL TRACT LOCATION (HCC): ICD-10-CM

## 2019-04-30 DIAGNOSIS — E66.01 MORBID OBESITY WITH BMI OF 50.0-59.9, ADULT (HCC): Primary | ICD-10-CM

## 2019-04-30 DIAGNOSIS — E78.5 HYPERLIPIDEMIA, UNSPECIFIED HYPERLIPIDEMIA TYPE: ICD-10-CM

## 2019-04-30 DIAGNOSIS — E55.9 VITAMIN D DEFICIENCY: ICD-10-CM

## 2019-04-30 PROCEDURE — G8417 CALC BMI ABV UP PARAM F/U: HCPCS | Performed by: PHYSICIAN ASSISTANT

## 2019-04-30 PROCEDURE — G8427 DOCREV CUR MEDS BY ELIG CLIN: HCPCS | Performed by: PHYSICIAN ASSISTANT

## 2019-04-30 PROCEDURE — 1036F TOBACCO NON-USER: CPT | Performed by: PHYSICIAN ASSISTANT

## 2019-04-30 PROCEDURE — 99213 OFFICE O/P EST LOW 20 MIN: CPT | Performed by: PHYSICIAN ASSISTANT

## 2019-04-30 RX ORDER — TRAMADOL HYDROCHLORIDE 50 MG/1
50 TABLET ORAL EVERY 6 HOURS PRN
COMMUNITY
End: 2019-08-05

## 2019-04-30 RX ORDER — CEFADROXIL 500 MG/1
500 CAPSULE ORAL 2 TIMES DAILY
COMMUNITY
End: 2019-08-05 | Stop reason: ALTCHOICE

## 2019-04-30 NOTE — PROGRESS NOTES
[facility]  [address]  [place] Hein, [address] [facility]  [phone]      Visit Date:  4/30/2019  Weight Management Pre-Op Follow-up    HPI:    Medically Supervised follow-up- Month #6 of 6- desires sleehafsa Andrews is here today for continued supervised weight management of morbid obesity. BMI 52. Down 6# since last month and overall since starting with weight management. Feels that she had a better month with nutrition. Being more conscious of portion sizes. Has not been writing down food intake. Drinking plenty of water. Eating mini-drumstick and strawberry cheesecake ice cream daily. No longer eating peanut butter and jelly. Had right ankle surgery 3/29/19- doing well. Continues to be non-weight bearing for the next 4 to 8 weeks. Continues to use bone stimulator 10 hours daily. Will likely start PT in the next month. Has not been getting in any activity or chair exercises in the last month, since surgery. Psychology evaluation with Dr. Dalton Benítez completed- note reviewed. Has attended support group x 1. Updated list up support groups given today. EGD completed. Comorbid conditions include hyperlipidemia and Crohn's disease. Long discussion on importance of lifestyle changes, making better decisions with nutrition and increasing dedicated activity to be successful lifelong with weight loss with or without bariatric surgery. If she does not lose enough weight with medical management she would like to proceed with sleeve gastrectomy. Physical Activity: none in last month    Current BMI: Body mass index is 52.07 kg/m².   Current Weight:   Wt Readings from Last 3 Encounters:   04/30/19 266 lb 9.6 oz (120.9 kg)   03/25/19 275 lb (124.7 kg)   03/14/19 272 lb 12.8 oz (123.7 kg)     Initial Body Weight:272    Past Medical History:  Past Medical History:   Diagnosis Date    Acute Crohn's disease ([address]     Arthritis     Hyperlipidemia        Past Surgical History:  Past Surgical History:   Procedure Laterality Date    ANKLE SURGERY Left     FRACTURE SURGERY      HYSTERECTOMY, TOTAL ABDOMINAL      HYSTEROPLASTY      UPPER GASTROINTESTINAL ENDOSCOPY N/A 2/18/2019    EGD WITH BX performed by Lesvia Goodson MD at [facility]   [address]  2014       Past Social History:  Social History     Socioeconomic History    Marital status:      Spouse name: Not on file    Number of children: Not on file    Years of education: Not on file    Highest education level: Not on file   Occupational History    Not on file   Social Needs    Financial resource strain: Not on file    Food insecurity:     Worry: Not on file     Inability: Not on file    Transportation needs:     Medical: Not on file     Non-medical: Not on file   Tobacco Use    Smoking status: Former Smoker     Packs/day: 1.00     Types: Cigarettes     Last attempt to quit: 2/18/2004     Years since quitting: 15.2    Smokeless tobacco: Never Used   Substance and Sexual Activity    Alcohol use: Yes     Comment: rare    Drug use: No    Sexual activity: Not on file   Lifestyle    Physical activity:     Days per week: Not on file     Minutes per session: Not on file    Stress: Not on file   Relationships    Social connections:     Talks on phone: Not on file     Gets together: Not on file     Attends Hindu service: Not on file     Active member of club or organization: Not on file     Attends meetings of clubs or organizations: Not on file     Relationship status: Not on file    Intimate partner violence:     Fear of current or ex partner: Not on file     Emotionally abused: Not on file     Physically abused: Not on file     Forced sexual activity: Not on file   Other Topics Concern    Not on file   Social History Narrative    Not on file        Medications:   Current Outpatient Medications   Medication Sig Dispense Refill    cefadroxil (DURICEF) 500 MG capsule Take 500 mg by mouth 2 times daily      traMADol (ULTRAM) 50 MG tablet Take 50 mg by mouth every 6 hours as needed for Pain.  Eluxadoline (VIBERZI PO) Take 75 mg by mouth daily      Cholecalciferol (VITAMIN D3) 2000 units TABS Take by mouth daily      acetaminophen (TYLENOL) 500 MG tablet Take 500 mg by mouth every 6 hours as needed for Pain      Menthol (HALLS COUGH DROPS) 5.8 MG LOZG Take by mouth      atorvastatin (LIPITOR) 40 MG tablet Take 40 mg by mouth daily       No current facility-administered medications for this visit. Allergies: Allergies   Allergen Reactions    Celebrex [Celecoxib] Other (See Comments)     Patient states double vision    Morphine     Toradol [Ketorolac Tromethamine]        Subjective:    Review of Systems:  Constitutional: Denies any fever, chills, fatigue. Wound: Denies any rash, skin color changes or wound problems. Resp: Denies any cough, shortness of breath. CV: Denies any chest pain, orthopnea or syncope. MS: Denies myalgias, (+)arthralgias- right ankle  GI: Denies any nausea, vomiting, diarrhea, constipation, melena, hematochezia. No incisional discomfort. : Denies any hematuria, hesitancy or dysuria. NEURO: Denies seizures, headache. .      Objective:    /76 (Site: Right Upper Arm, Position: Sitting, Cuff Size: Large Adult)   Pulse 88   Temp 98.4 °F (36.9 °C) (Oral)   Resp 18   Ht 5' (1.524 m)   Wt 266 lb 9.6 oz (120.9 kg)   BMI 52.07 kg/m²   Physical Examination:   Constitutional: Alert and oriented to person, place and time. Well-developed, well- nourished. Head: Normocephalic and atraumatic  Neck: Supple. Eyes: EOMI b/l. Conjunctivae normal.  No scleral icterus. Respiratory: Effort normal. No respiratory distress. Abd: Benign  Ext:  Movement x 4. No edema  Skin; Warm and dry, no visible rashes, lesions or ulcers.    Neuro: Cranial Nerves Grossly Intact; nml coordination        CBC   Lab Results   Component Value Date    WBC 7.7 11/19/2018    RBC 4.41 11/19/2018    HGB 12.8 11/19/2018    HCT 39.9 11/19/2018    MCV 90.5 11/19/2018    MCH 29.0 11/19/2018    MCHC 32.1 11/19/2018     11/19/2018    MPV 9.1 11/19/2018    SEGSPCT 56.6 11/19/2018    LABLYMP 34.9 11/19/2018    MONOPCT 6.1 11/19/2018    LABEOS 1.7 11/19/2018    BASO 0.4 11/19/2018    NRBC 0 11/19/2018    SEGSABS 4.4 11/19/2018    LYMPHSABS 2.7 11/19/2018    MONOSABS 0.5 11/19/2018    EOSABS 0.1 11/19/2018    BASOSABS 0.0 11/19/2018        BMP/CMP   Lab Results   Component Value Date    GLUCOSE 108 11/19/2018    CREATININE 0.5 11/19/2018    BUN 14 11/19/2018     11/19/2018    K 4.2 11/19/2018     11/19/2018    CO2 21 11/19/2018    CALCIUM 9.5 11/19/2018    AST 22 11/19/2018    ALKPHOS 92 11/19/2018    PROT 7.7 11/19/2018    LABALBU 4.4 11/19/2018    BILITOT 0.5 11/19/2018    ALT 31 11/19/2018        PREALBUMIN   Lab Results   Component Value Date    PREALBUMIN 26.0 11/19/2018        VITAMIN B12   Lab Results   Component Value Date    [id number] 481 11/19/2018        VITAMIN D   Lab Results   Component Value Date    VITD25 28 11/19/2018        PTH  Lab Results   Component Value Date    IPTH 46.6 11/19/2018       VITAMIN B1/ THIAMINE   Lab Results   Component Value Date    [id number] 120 11/19/2018        LIPID SCREEN (FASTING)   Lab Results   Component Value Date    CHOL 167 11/19/2018    TRIG 207 11/19/2018    HDL 44 11/19/2018    LDLCALC 82 11/19/2018   ,     HGA1C (T2DM ONLY)   Lab Results   Component Value Date    LABA1C 5.9 11/19/2018    AVGG 117 11/19/2018        TSH   Lab Results   Component Value Date    TSH 3.560 11/19/2018        IRON   Lab Results   Component Value Date    IRON 69 11/19/2018        TIBC  Lab Results   Component Value Date    TIBC 302 11/19/2018       FERRITIN  Lab Results   Component Value Date    FERRITIN 236 11/19/2018       VITAMIN A  Lab Results   Component Value Date    RETINOL SEE BELOW 11/19/2018       NICOTINE  Lab Results   Component Value Date    NMET SEE BELOW 11/19/2018       UDS  Lab Results   Component Value Date    UDP SEE BELOW 11/19/2018       PSA  No results found for: LABPSA    GFR  Lab Results   Component Value Date    LABGLOM >90 11/19/2018       DEXA  No results found for this or any previous visit. Assessment:       Diagnosis Orders   1. Morbid obesity with BMI of 50.0-59.9, adult ([address]     2. Vitamin D deficiency  Vitamin D 25 Hydroxy   3. Hyperlipidemia, unspecified hyperlipidemia type     4. Crohn's disease with complication, unspecified gastrointestinal tract location [facility])         Plan:    · Behavior modification discussed in detail in regards to dietary habits. · Nutritional education occurred during visit. Continue following recommendations  per  dietitian. · Improvement in fitness/exercise discussed with patient and the need for this  with/without surgery. Encouraged chair exercise. · Completed orientation with Devin Montiel, Exercise Physiologist, at the fitness center. · Psychology evaluation-completed - note reviewed. · EGD completed- no concerning findings  · Encouraged to attend support groups. Has attended x 1. Updated list given today. · Goal to lose 3% TBW prior to surgery- currently down 6#  · Continue Vit D3 OTC. Order given to repeat Vit D level  · Long discussion on importance of lifestyle changes, making better decisions with  nutrition and increasing dedicated activity to be successful lifelong with weight loss  with or without bariatric surgery. · Cut back on ice cream/ sweets  Return in about 1 month (around 5/30/2019) for Follow up. I spent over 15 minutes with the patient, with greater that 50% of that time spent on education, counseling and coordination of care.      Electronically signed by TAINA Aguayo on 4/30/2019 at 11:13 AM

## 2019-04-30 NOTE — PATIENT INSTRUCTIONS
Goals: 1. Initial weight loss goal was 3-5 % of initial weight (272 lbs) = 258-264 lbs  2. Repeat Vitamin D lab level. Restart your daily Vitamin D3 2,000IU's per day  3. Suggested calorie range per day would be between ~ 1,200-1,500 calories per day. Can use My Fitness Pal phone huang to track food and calorie intake. 4.  Good job with water intake aim for at least ~ 64 oz a day   5. Limit to only one mini ice cream a day instead of two.   Ideally want to keep to 1-2 per week  Attend support group Tuesday May 14th at 5:30pm [facility]

## 2019-04-30 NOTE — PROGRESS NOTES
Beverages: states Stopped drinking the coconut milk, drinking 8oz glass of chocolate milk per day,  using skim milk one cup/day  Drinking around 5 bottles of water and states adequate water intake is not a problem    -Impression of Dietary Intake: limited cooking right now with ankle surgery. - Pt  is working towards  avoiding high fat/high sugar foods. Pt  is working towards  including protein at meals and snacks. Exercise:  Patient has attended Fitness Orientation  -Physical Activity is: Will start PT when non-weight bearing restriction  Lifted  in 4-8 weeks. Has bone stimulator at home to be used for 10 hours. Overall decreased activity with ankle surgery. Has Aide in house on M,W, F      Nutrition Diagnosis: Overweight/Obesity related to currently undergoing MNT as evidenced by BMI of 52    Intervention:   Healthy behaviors: not skipping meals and adequate fluid intake  Patient has attended support group times one. Pt requires to attend an additional support group. Has completed six months of medically supervised weight loss and has not met 3-5% initial weight loss goal set at first visit. Patient interested in tracking total calories via phone huang to keep portion sizes controlled and calorie intake down with limited mobility.     -  Patient Instructions   Goals:  1. Initial weight loss goal was 3-5 % of initial weight (272 lbs) = 258-264 lbs  2. Repeat Vitamin D lab level. Restart your daily Vitamin D3 2,000IU's per day  3. Suggested calorie range per day would be between ~ 1,200-1,500 calories per day. Can use My Fitness Pal phone huang to track food and calorie intake. 4.  Good job with water intake aim for at least ~ 64 oz a day   5. Limit to only one mini ice cream a day instead of two.   Ideally want to keep to 1-2 per week  Attend support group Tuesday May 14th at 5:30pm in [facility]       -Followup visit: 6-8 weeks with dietitian and Dr Garret Beck to see if ready to submit to insurance pending meets weight loss goal and off non-weight bearing restrictitions.      Total time involved in direct patient education: 30 minutes    Demetra Guerrier RD, LD  Dietitian- [facility]

## 2019-06-10 LAB — VITAMIN D 25-HYDROXY: 47.1 NG/ML (ref 30–100)

## 2019-06-18 DIAGNOSIS — E66.01 MORBID OBESITY WITH BMI OF 50.0-59.9, ADULT (HCC): ICD-10-CM

## 2019-06-18 DIAGNOSIS — E78.5 HYPERLIPIDEMIA, UNSPECIFIED HYPERLIPIDEMIA TYPE: Primary | ICD-10-CM

## 2019-06-21 ENCOUNTER — OFFICE VISIT (OUTPATIENT)
Dept: BARIATRICS/WEIGHT MGMT | Age: 49
End: 2019-06-21
Payer: MEDICAID

## 2019-06-21 ENCOUNTER — OFFICE VISIT (OUTPATIENT)
Dept: BARIATRICS/WEIGHT MGMT | Age: 49
End: 2019-06-21

## 2019-06-21 VITALS
SYSTOLIC BLOOD PRESSURE: 134 MMHG | DIASTOLIC BLOOD PRESSURE: 86 MMHG | RESPIRATION RATE: 18 BRPM | WEIGHT: 266 LBS | HEART RATE: 66 BPM | TEMPERATURE: 99.3 F | BODY MASS INDEX: 52.22 KG/M2 | HEIGHT: 60 IN

## 2019-06-21 VITALS — BODY MASS INDEX: 52.22 KG/M2 | WEIGHT: 266 LBS | HEIGHT: 60 IN

## 2019-06-21 DIAGNOSIS — E66.01 MORBID OBESITY WITH BMI OF 50.0-59.9, ADULT (HCC): Primary | ICD-10-CM

## 2019-06-21 DIAGNOSIS — E55.9 VITAMIN D DEFICIENCY: ICD-10-CM

## 2019-06-21 DIAGNOSIS — G89.29 CHRONIC BILATERAL LOW BACK PAIN WITHOUT SCIATICA: ICD-10-CM

## 2019-06-21 DIAGNOSIS — E78.5 HYPERLIPIDEMIA, UNSPECIFIED HYPERLIPIDEMIA TYPE: ICD-10-CM

## 2019-06-21 DIAGNOSIS — M54.50 CHRONIC BILATERAL LOW BACK PAIN WITHOUT SCIATICA: ICD-10-CM

## 2019-06-21 DIAGNOSIS — K50.919 CROHN'S DISEASE WITH COMPLICATION, UNSPECIFIED GASTROINTESTINAL TRACT LOCATION (HCC): ICD-10-CM

## 2019-06-21 PROCEDURE — G8427 DOCREV CUR MEDS BY ELIG CLIN: HCPCS | Performed by: SURGERY

## 2019-06-21 PROCEDURE — G8417 CALC BMI ABV UP PARAM F/U: HCPCS | Performed by: SURGERY

## 2019-06-21 PROCEDURE — 1036F TOBACCO NON-USER: CPT | Performed by: SURGERY

## 2019-06-21 PROCEDURE — 99213 OFFICE O/P EST LOW 20 MIN: CPT | Performed by: SURGERY

## 2019-06-21 NOTE — PROGRESS NOTES
Assessment: Patient is a 54 y.o. female seen for   month six  follow up MNT visit for  pre op bariatric surgery desires sleeve    Vitals from current and previous visits:  Vitals 3/50/0775   SYSTOLIC    DIASTOLIC    Site    Position    Cuff Size    Pulse    Temp    Resp    SpO2    Weight 266 lb   Height 5' 0\"   BMI (wt*703/ht~2) 51.94 kg/m2   Pain Level        Initial weight at start of Weight Management Program was: 272 lbs. Total weight loss = 2.3% of initial weight     Claudia wt is stable / unchanged  -Weight goal: lose weight.     -Nutritionally relevant labs: Has resumed the 2,000 IU's Vitamin D3. Vitamin D Drawn and level now at 52    Lab Results   Component Value Date/Time    LABA1C 5.9 11/19/2018 01:47 PM    GLUCOSE 108 11/19/2018 01:46 PM    CHOL 167 11/19/2018 01:46 PM    HDL 44 11/19/2018 01:46 PM    LDLCALC 82 11/19/2018 01:46 PM    TRIG 207 (H) 11/19/2018 01:46 PM                  Lab Results   Component Value Date    VITD25 47.10 06/10/2019       -Food Recall:  Eating three times a day. Breakfast: two scrambled eggs with slice of cheese, activa yogurt and blueberries. Lunch: has an aide that is helping - eating boiled chicken , skinless with broth, cauliflower and broccoli, . Dinner: baked cod or chicken . Snacks: water melon, or cuties, apples. Main Beverages: one cup skim milk per day, Drinking around 5 bottles of water and states adequate water intake is not a problem       -Impression of Dietary Intake: on average, 3 meals per day. - Pt  is   avoiding high fat/high sugar foods. Pt  is   including protein at meals and snacks. Exercise:  Patient has attended Fitness Orientation  -Physical Activity is:  Going to PT twice a week. No longer non-weight bearing. Does use a scooter    Has Aide in house on M,W, F    Nutrition Diagnosis: Overweight/Obesity related to currently undergoing MNT as evidenced by  Body mass index is 51.95 kg/m². .    Intervention:   Healthy behaviors: eliminated carbonated beverages, not skipping meals, adequate fluid intake and including lean proteins with meals  Patient has attended support group times two. Has maintained weight over last six week and recent ankle surgery without additional weight gain. Ready from nutrition standpoint to proceed with bariatric surgery. Handouts given: Bariatric Vitamins/Protein Cost and Recommendations and Vitamin Samples    -  Patient Instructions   Goals:  1. Remember you will need to separate you liquids from solids after surgery. No liquids 30 minutes before your meals and no liquids 30 minutes after your meals. 2. You will need to take your time with meals after surgery and begin practicing this now - chew foods really well and take 20-30 minutes at each meal.  3.  Aim for consistent and set meal times- choose lean protein foods first, followed by vegetables and fruit, then starch food last if still hungry. Consistency is key following bariatric surgery.   4.  Continue regular physical activity- recommend stay as active as you can leading up to surgery and after surgery this will remain important for your weight loss efforts               -Followup visit: pre op teaching class upon insurance approval    Total time involved in direct patient education: 30 minutes    Bridget Smith RD, LD  Dietitian- [facility]

## 2019-06-22 ASSESSMENT — ENCOUNTER SYMPTOMS
CONSTIPATION: 0
BACK PAIN: 0
SHORTNESS OF BREATH: 0
SINUS PRESSURE: 0
ALLERGIC/IMMUNOLOGIC NEGATIVE: 1
TROUBLE SWALLOWING: 0
CHEST TIGHTNESS: 0
BLOOD IN STOOL: 0
COLOR CHANGE: 0
RECTAL PAIN: 0
RHINORRHEA: 0
FACIAL SWELLING: 0
EYE ITCHING: 0
EYE REDNESS: 0
SORE THROAT: 0
STRIDOR: 0
WHEEZING: 0
COUGH: 0
PHOTOPHOBIA: 0
ABDOMINAL PAIN: 0
DIARRHEA: 0
VOICE CHANGE: 0
NAUSEA: 0
APNEA: 0
ABDOMINAL DISTENTION: 0
VOMITING: 0
EYE PAIN: 0
ANAL BLEEDING: 0
CHOKING: 0
EYE DISCHARGE: 0

## 2019-06-22 NOTE — PROGRESS NOTES
decreased urine volume, difficulty urinating, dyspareunia, dysuria, enuresis, flank pain, frequency, genital sores, hematuria, menstrual problem, pelvic pain, urgency, vaginal bleeding, vaginal discharge and vaginal pain. Musculoskeletal: Negative for arthralgias, back pain, gait problem, joint swelling, myalgias, neck pain and neck stiffness. Skin: Negative for color change, pallor, rash and wound. Allergic/Immunologic: Negative. Neurological: Negative for dizziness, tremors, seizures, syncope, facial asymmetry, speech difficulty, weakness, light-headedness, numbness and headaches. Hematological: Negative for adenopathy. Does not bruise/bleed easily. Psychiatric/Behavioral: Positive for sleep disturbance. Negative for agitation, behavioral problems, confusion, decreased concentration, dysphoric mood, hallucinations, self-injury and suicidal ideas. The patient is not nervous/anxious and is not hyperactive. Past Medical History:   Diagnosis Date    Acute Crohn's disease ([facility] [address]     Arthritis     Hyperlipidemia        Past Surgical History:   Procedure Laterality Date    ANKLE SURGERY Left     FRACTURE SURGERY      HYSTERECTOMY, TOTAL ABDOMINAL      HYSTEROPLASTY      UPPER GASTROINTESTINAL ENDOSCOPY N/A 2/18/2019    EGD WITH BX performed by Jonathan Mcmillan MD at [address] [place] [address]  2014       Current Outpatient Medications   Medication Sig Dispense Refill    cefadroxil (DURICEF) 500 MG capsule Take 500 mg by mouth 2 times daily      traMADol (ULTRAM) 50 MG tablet Take 50 mg by mouth every 6 hours as needed for Pain.  Eluxadoline (VIBERZI PO) Take 75 mg by mouth daily      Cholecalciferol (VITAMIN D3) 2000 units TABS Take by mouth daily      Menthol (HALLS COUGH DROPS) 5.8 MG LOZG Take by mouth      atorvastatin (LIPITOR) 40 MG tablet Take 40 mg by mouth daily       No current facility-administered medications for this visit.         Allergies   Allergen Reactions    Celebrex [Celecoxib] Other (See Comments)     Patient states double vision    Morphine     Toradol [Ketorolac Tromethamine]        Family History   Problem Relation Age of Onset    Arthritis Mother     High Blood Pressure Mother     Arthritis Father     Diabetes Father     Dementia Father     Parkinsonism Father     High Blood Pressure Father     Cancer Paternal Uncle         colon       Social History     Socioeconomic History    Marital status:      Spouse name: Not on file    Number of children: Not on file    Years of education: Not on file    Highest education level: Not on file   Occupational History    Not on file   Social Needs    Financial resource strain: Not on file    Food insecurity:     Worry: Not on file     Inability: Not on file    Transportation needs:     Medical: Not on file     Non-medical: Not on file   Tobacco Use    Smoking status: Former Smoker     Packs/day: 1.00     Types: Cigarettes     Last attempt to quit: 2/18/2004     Years since quitting: 15.3    Smokeless tobacco: Never Used   Substance and Sexual Activity    Alcohol use: Yes     Comment: rare    Drug use: No    Sexual activity: Not on file   Lifestyle    Physical activity:     Days per week: Not on file     Minutes per session: Not on file    Stress: Not on file   Relationships    Social connections:     Talks on phone: Not on file     Gets together: Not on file     Attends Yazidi service: Not on file     Active member of club or organization: Not on file     Attends meetings of clubs or organizations: Not on file     Relationship status: Not on file    Intimate partner violence:     Fear of current or ex partner: Not on file     Emotionally abused: Not on file     Physically abused: Not on file     Forced sexual activity: Not on file   Other Topics Concern    Not on file   Social History Narrative    Not on file     Vitals:    06/21/19 0910   BP: 134/86   Pulse: 66   Resp: 18   Temp: 99.3 °F (37.4 °C)     Body mass index is 51.95 kg/m². Wt Readings from Last 3 Encounters:   06/21/19 266 lb (120.7 kg)   06/21/19 266 lb (120.7 kg)   04/30/19 266 lb 9.6 oz (120.9 kg)     Objective:   Physical Exam   Constitutional: She is oriented to person, place, and time. She appears well-developed and well-nourished. No distress. HENT:   Head: Normocephalic and atraumatic. Right Ear: External ear normal.   Left Ear: External ear normal.   Nose: Nose normal.   Mouth/Throat: Oropharynx is clear and moist.   Eyes: Conjunctivae and EOM are normal. Right eye exhibits no discharge. Left eye exhibits no discharge. No scleral icterus. Neck: Normal range of motion. Neck supple. Cardiovascular: Normal rate, regular rhythm, normal heart sounds and intact distal pulses. Pulmonary/Chest: Effort normal and breath sounds normal. No respiratory distress. She has no wheezes. She has no rales. She exhibits no tenderness. Abdominal: Soft. Bowel sounds are normal. She exhibits no distension and no mass. There is no tenderness. There is no rebound and no guarding. Musculoskeletal: Normal range of motion. She exhibits no edema or tenderness. Neurological: She is alert and oriented to person, place, and time. No cranial nerve deficit. Skin: Skin is warm and dry. No rash noted. She is not diaphoretic. No erythema. No pallor. Psychiatric: She has a normal mood and affect. Her behavior is normal. Judgment and thought content normal.   Vitals reviewed.     Lab Results   Component Value Date     11/19/2018    K 4.2 11/19/2018     11/19/2018    CO2 21 (L) 11/19/2018     Lab Results   Component Value Date    CREATININE 0.5 11/19/2018     Lab Results   Component Value Date    WBC 7.7 11/19/2018    HGB 12.8 11/19/2018    HCT 39.9 11/19/2018    MCV 90.5 11/19/2018     11/19/2018     Lab Results   Component Value Date    ALT 31 11/19/2018    AST 22 11/19/2018    ALKPHOS 92 11/19/2018    BILITOT 0.5 11/19/2018 Imaging -   Narrative   PROCEDURE: XR CHEST (2 VW)       CLINICAL INFORMATION: Pre-op chest exam.       COMPARISON: 2010       TECHNIQUE: PA and lateral views the chest.       FINDINGS:       The heart and mediastinum remain within normal limits. There is no focal infiltrate, effusion or pneumothorax. The skeleton is negative for active pathology.           Impression   1. Negative exam for acute pathology of the chest.           **This report has been created using voice recognition software. It may contain minor errors which are inherent in voice recognition technology. **       Final report electronically signed by Dr. Germain Salinas on 3/29/2019 7:23 AM      PROCEDURE NOTE     PATIENT NAME: Aiyana Desouza                     :        1970  MED REC NO:   [id number]                           ROOM:  ACCOUNT NO:   [de-identified]                           ADMIT DATE: 2019  PROVIDER:     Azalia Hughes M.D.     DATE OF PROCEDURE:  2019     PROCEDURE:  Esophagogastroduodenoscopy.     SURGEON:  Azalia Hughes M.D.     INDICATIONS:  The patient is a 29-year-old pleasant female who is  undergoing EGD part of pre-gastric sleeve procedure protocol. Denied  any painful swallowing. Reflux symptoms once in a while. Denied any  dysphagia. She is undergoing further evaluation.     Please see my brief history for details and for physical examination.     ASA CLASSIFICATION:  As per Anesthesia.   Please see Anesthesia note for  details.     INSTRUMENT:  GIF-190 gastroscope.     PHOTOGRAPHS:  Yes.     BIOPSIES:  Yes.     CONSENT:  Procedure, indications and complications including, but not  limited to perforation, bleeding, infection, adverse reaction to  medicine, very slight chance of missing significant lesions discussed  with the patient, and the patient expressed her understanding and a  written consent was obtained.     DESCRIPTION OF PROCEDURE:  The patient was placed in the left lateral  decubitus position in the Endo Room #11. The patient was placed on  appropriate monitoring of vitals including blood pressure, heart rate,  and pulse ox. Oropharynx was sprayed with Cetacaine spray and  bite-block was placed between maxilla and mandible. After the adequate  total intravenous anesthesia was given by Anesthesia, the GIF-190  gastroscope was inserted into the oropharynx, and the esophagus was  intubated under direct vision. The scope was advanced down through the  stomach into second portion of duodenum. On careful inspection and on  withdrawal of the scope, the first and second portion of duodenum look  normal as shown in picture #A3 and #A4. The antrum of the stomach looks  normal as shown in picture #A5. Biopsies obtained for CLOtest.  Lesser  and greater curvature and body of the stomach look normal as shown in  picture #A6. On retroflex, fundus looks normal as shown in picture #A7. In the distal esophagus, the patient has erythema and edema extending  beyond the Z-line, consistent with [facility] grade B esophagitis,  noted as shown in picture #A2 and #A8. Biopsies obtained to rule out  King epithelium. Proximal esophagus looks normal as shown in picture  #A1. Air was withdrawn as the scope was removed. The patient tolerated  the procedure well without any immediate complications. Vitals  including blood pressure, heart rate and pulse ox were stable during the  procedure and after the procedure. The patient was transferred to the  recovery room in stable condition.     IMPRESSION:  Esophagogastroduodenoscopy to the second portion of  duodenum. Multiple biopsies obtained from the antrum of the stomach to  rule out H. pylori. Grade 2 esophagitis or [place] B esophagitis  noted. Biopsies obtained to rule out King epithelium.     MY RECOMMENDATION:  Antireflux instructions. Protonix 40 mg p.o. q.a.m. Call my office in less than two weeks for the biopsy results. Okay to  proceed with gastric sleeve operation as planned by Dr. Garret Beck.     Thank you Dr. Garret Beck. and Vy Alexander for letting me to do  procedure on the patient. Do not hesitate to call me if you have any  questions. My recommendations are above.  Garrett Gallegos M.D.     [id number]. Pylori 02/18/2019  9:57 AM  - [facility]   Negative      PATHOLOGY REPORT                      ATTN: MIR MORENO                      REQ: MIR MORENO      Copies To:   JOSE LUIS NOONAN      Clinical Information: PRE PROCEDURE PROTOCOL    FINAL DIAGNOSIS:  Distal esophagus, biopsy:   Mild chronic reflux esophagitis and carditis.  Negative for dysplasia. Specimen:  BIOPSY OF DISTAL ESOPHAGUS, LOWER, R/O RAMIREZ'S      Gross Examination:  The container is labeled Jeanne Gutierrez, biopsy of distal esophagus, rule  out Ramirez's.  Received in formalin are three bits of tan tissue  varying in size from 1 mm up to 3 mm.  1 ns.  ALP/DKR:[id number]    Microscopic Examination:  The specimen consists of squamous and gastric mucosa.  The sections  demonstrate a mild chronic inflammatory cell infiltrate and features  consistent with reflux. David Morning is no intestinal metaplasia or dysplasia  recognized. Aretta Actis                                        <Sign Out Dr. Bela Falcon M.D., F.C.A.P. Patient Active Problem List   Diagnosis    Hyperlipidemia     Assessment:      1. Morbid obesity (BMI 51)  2. Hypercholesterolemia  3. Chronic neck pain  4. Chronic lower back pain  5. Crohn's disease  6. Vitamin D deficiency        Plan:      1. Discussion again about the pros and cons of weight loss surgery. The risks benefits and alternatives to laparoscopic adjustable band, gastric sleeve and gastric Ken-en-Y bypass were discussed in detail. The pros and cons of robotic assisted, laparoscopic and open techniques were discussed.   2. Behavior modification discussed again in regards to dietary habits. 3.  Nutritional education occurred during visit. Follow up with dietitian for further evaluation -continue to follow their recommendations as directed. 4.  Options for medical management of morbid obesity discussed. 5.  Improvement in fitness/exercise discussed with patient and the need for this with/without surgery. 6.  Medical necessity letter from PCP. 7.  Follow-up in one month at weight management program at [facility]. 8.  Signs and symptoms reviewed with patient that would be concerning and need her to return to office for re-evaluation. Patient states she will call if she has questions or concerns. 9. Multivitamin  10. Psychology evaluation completed. Follow-up as needed. 11. EGD pleaded. Results reviewed. All questions answered. 12.  Encouraged support groups  13. Send LOMN     Patient states that she has not been able to lose enough adequate excess body weight with medical management only and would like to proceed with a robotic sleeve gastrectomy for further weight loss.     More than 15 minutes spent with patient today. Greater than 50% of the time was involved counseling, educaton and coordinating care.           Akila Gibson MD

## 2019-07-23 LAB
ABSOLUTE BASO #: 0 /CMM (ref 0–200)
ABSOLUTE EOS #: 200 /CMM (ref 0–500)
ABSOLUTE LYMPH #: 3500 /CMM (ref 1000–4800)
ABSOLUTE MONO #: 600 /CMM (ref 0–800)
ABSOLUTE NEUT #: 4700 /CMM (ref 1800–7700)
ANION GAP SERPL CALCULATED.3IONS-SCNC: 8 MMOL/L (ref 4–12)
BASOPHILS RELATIVE PERCENT: 0.4 % (ref 0–2)
BUN BLDV-MCNC: 19 MG/DL (ref 7–20)
CALCIUM SERPL-MCNC: 9.2 MG/DL (ref 8.8–10.5)
CHLORIDE BLD-SCNC: 105 MEQ/L (ref 101–111)
CO2: 27 MEQ/L (ref 21–32)
CREAT SERPL-MCNC: 0.65 MG/DL (ref 0.6–1.3)
CREATININE CLEARANCE: >60
EOSINOPHILS RELATIVE PERCENT: 2 % (ref 0–6)
GLUCOSE: 128 MG/DL (ref 70–110)
HCT VFR BLD CALC: 37.5 % (ref 35–44)
HEMOGLOBIN: 12.5 GM/DL (ref 12–15)
LYMPHOCYTES RELATIVE PERCENT: 38.7 % (ref 15–45)
MCH RBC QN AUTO: 29.3 PG (ref 27.5–33)
MCHC RBC AUTO-ENTMCNC: 33.3 GM/DL (ref 33–36)
MCV RBC AUTO: 87.9 CU MIC (ref 80–97)
MONOCYTES RELATIVE PERCENT: 6.3 % (ref 2–10)
NEUTROPHILS RELATIVE PERCENT: 52.6 % (ref 40–70)
NUCLEATED RBCS: 0 /100 WBC
PDW BLD-RTO: 14.5 % (ref 12–16)
PLATELET # BLD: 330 TH/CMM (ref 150–400)
POTASSIUM SERPL-SCNC: 3.8 MEQ/L (ref 3.6–5)
RBC # BLD: 4.27 MIL/CMM (ref 4–5.1)
SODIUM BLD-SCNC: 140 MEQ/L (ref 135–145)
WBC # BLD: 9 TH/CMM (ref 4.4–10.5)

## 2019-07-29 ENCOUNTER — OFFICE VISIT (OUTPATIENT)
Dept: BARIATRICS/WEIGHT MGMT | Age: 49
End: 2019-07-29

## 2019-07-29 DIAGNOSIS — E66.01 MORBID OBESITY DUE TO EXCESS CALORIES (HCC): Primary | ICD-10-CM

## 2019-07-29 NOTE — PROGRESS NOTES
Hakeem Ramírez gained 5 lbs since joining [facility].  After nutrition evaluation and education, patient is considered to be a good surgical candidate from a MNT perspective. Patient's body composition for pre-op teaching class  was measured using the [facility] Scale. Results were saved and reviewed with the patient. Patient was educated on 2- week pre-operative nutrition protocol and post test completed. Pre-operative and post-operative diet guidelines were discussed and written information was provided. Nectar protein supplements were purchased. Vitamin regimen with Bariatric Advantage and/or Celebrate vitamins was explained, and patient purchased 30 mg iron soft chew at this visit. Plans to use liquid form MVI and powder form Calcium that was brought to pre op class for review. Importance of vitamin compliance was discussed and potential of vitamin deficiencies was reviewed. Encouraged continued physical activity. Patient signed agreement stating they are committed to lifelong follow up, smoking and alcohol cessation, vitamin compliance, and 2 week pre-operative dietary protocol.

## 2019-07-29 NOTE — PROGRESS NOTES
Ashley Goodson was seen today for pre-operative teaching class. She   was educated today on surgery procedure, possible complications, Lovenox self administration (teaching guide given to patient as well as Lovenox DVD viewed today), use of incentive spirometer for 2 weeks prior to surgery date and instructed to remain NPO after midnight the night before surgery, or risk cancellation of surgical procedure. Importance of recognizing signs and symptoms of wound infection were discussed as well as when to contact the physician. We discussed the flow of events on the day of surgery from admit to SDS, OR, PACU, and 7K admit. I reinforced the need to walk post operative on 7K and she voiced understanding of this. Pre-operative measurements were taken and scanned into chart as well as measurement for PO day 1 gastrograffin swallow test. SECA scale completed today.

## 2019-07-30 ENCOUNTER — HOSPITAL ENCOUNTER (OUTPATIENT)
Dept: PHYSICAL THERAPY | Age: 49
Setting detail: THERAPIES SERIES
Discharge: HOME | End: 2019-07-30
Payer: MEDICAID

## 2019-07-30 PROCEDURE — 97110 THERAPEUTIC EXERCISES: CPT

## 2019-07-30 PROCEDURE — 97161 PT EVAL LOW COMPLEX 20 MIN: CPT

## 2019-07-30 ASSESSMENT — PAIN DESCRIPTION - PAIN TYPE: TYPE: CHRONIC PAIN

## 2019-07-30 ASSESSMENT — PAIN DESCRIPTION - ORIENTATION: ORIENTATION: RIGHT

## 2019-07-30 ASSESSMENT — PAIN DESCRIPTION - LOCATION: LOCATION: FOOT

## 2019-07-30 ASSESSMENT — PAIN SCALES - GENERAL: PAINLEVEL_OUTOF10: 2

## 2019-07-30 NOTE — FLOWSHEET NOTE
** PLEASE SIGN, DATE AND TIME CERTIFICATION BELOW AND RETURN TO [facility] OUTPATIENT REHABILITATION (FAX #: [phone]). ATTEST/CO-SIGN IF ACCESSING VIA [employer]. THANK YOU.**    I certify that I have examined the patient below and determined that Physical Medicine and Rehabilitation service is necessary and that I approve the established plan of care for up to 90 days or as specifically noted. Attestation, signature or co-signature of physician indicates approval of certification requirements.    ________________________ ____________ __________  Physician Signature   Date   Time       [address]     Time In: 5344  Time Out: 0900  Minutes: 45  Timed Code Treatment Minutes: 10 Minutes    Date: 2019  Patient Name: Kenn Phan,  Gender:  female        CSN: [id number]   : 1970  (54 y.o.)        Referring Practitioner: Agustín Lanza DPM      Diagnosis: Plantar fasciitis, right, achilles tendon  Treatment Diagnosis: chronic right foot/ankle pain, decreased right ankle AROM, right ankle weakness, difficulty ambulating  Additional Pertinent Hx: PMH: Obesity. L ankle surgery in . General:  PT Visit Information  Onset Date: 19  PT Insurance Information: Jasmyn Redman Medicaid- allowed 30 visits, precert required after 54UY visit; aquatics and modalities covered except ionto, HP/CP  Total # of Visits Approved: 30  Total # of Visits to Date: 1  Plan of Care/Certification Expiration Date: 19  Progress Note Counter: 1/10 for PN. See Medical History Questionnaire for information related to allergies and medications. Subjective:  Chart Reviewed: Yes  Patient assessed for rehabilitation services?: Yes  Comments: Follow up with Dr. Lina Weaver after at least 6 sessions PT. Pt scheduled for bariatric sleeve surgery 19. Subjective: Pt s/p right foot surgery 2019.  Pt reports

## 2019-08-02 ENCOUNTER — HOSPITAL ENCOUNTER (OUTPATIENT)
Dept: PHYSICAL THERAPY | Age: 49
Setting detail: THERAPIES SERIES
Discharge: HOME | End: 2019-08-02
Payer: MEDICAID

## 2019-08-02 PROCEDURE — 97140 MANUAL THERAPY 1/> REGIONS: CPT

## 2019-08-02 PROCEDURE — 97110 THERAPEUTIC EXERCISES: CPT

## 2019-08-02 ASSESSMENT — PAIN DESCRIPTION - LOCATION: LOCATION: FOOT

## 2019-08-02 ASSESSMENT — PAIN SCALES - GENERAL: PAINLEVEL_OUTOF10: 1

## 2019-08-02 ASSESSMENT — PAIN DESCRIPTION - ORIENTATION: ORIENTATION: RIGHT

## 2019-08-02 NOTE — PROGRESS NOTES
step stretch         Activity Tolerance:  Activity Tolerance: Patient Tolerated treatment well  Activity Tolerance: Pt reports 3/10 right foot pain/soreness at end of session, but feels Astym really helped. Assessment: Body structures, Functions, Activity limitations: Decreased functional mobility , Decreased ROM, Decreased strength, Decreased balance, Increased Pain  Assessment: Initiated Astym treatment to right lower leg to decrease soft tissue fibrosis and promote faciliation of healthy tissue to decrease pain and improve mobility. Gentle pressure used to sensitivity in RLE. Note increased blood flow improving color in right foot post treatment. Initiated standing weight shifts, gastroc stretch, seated heel/toe raises and circles with wooden BAPS board for ROM and strength with good tolerance. Prognosis: Good       Patient Education:  Patient Education: Continue with HEP- add standing calf stretches, heel/toe raises. Monitor response to Astym- may have bruising, soreness; ice as needed    Plan:  Times per week: 2-3x  Times per day: Daily  Plan weeks: 8 weeks  Current Treatment Recommendations: Strengthening, ROM, Balance Training, Functional Mobility Training, Gait Training, Home Exercise Program, Manual Therapy - Soft Tissue Mobilization, Patient/Caregiver Education & Training, Modalities  Plan Comment: Continue with current POC. Goals:  Patient goals : Reduce right foot pain    Short term goals  Time Frame for Short term goals: 4 weeks  Short term goal 1: Pt will report <3/10 right heel/achilles tendon pain at worst to tolerate standing and walking longer durations. Short term goal 2: Pt will improve AROM right ankle df from -12 to -6 deg, pf from 42 to 50 deg, inversion from 12 to 20 deg, and eversion from 1-2 deg to 8 degrees to assist with transfers and normalizing gait pattern.   Short term goal 3: Pt will improve right ankle strength from 2-/5 to 3/5 to tolerate FWB and weight shifting for gait.  Short term goal 4: Pt will ambulate with SW x50ft with good heel/toe pattern and near normal step length with/without boot for household mobility. Long term goals  Time Frame for Long term goals : 8 weeks  Long term goal 1: Pt will improve AROM right ankle df to neutral, pf to 60 deg, inversion to 30 deg and eversion to 10 deg for ease squatting and walking. Long term goal 2: Pt will improve right ankle strength to 4-/5 to tolerate SLS x3 sec to prevent falls when walking on uneven surfaces. Long term goal 3: Pt will ambulate without AD >50ft with minimal deviations/antalgia for household and short community mobility. Long term goal 4: Pt will be independent and compliant with HEP to achieve above goals.     Jeet Oh, PT

## 2019-08-05 DIAGNOSIS — E66.01 MORBID OBESITY WITH BMI OF 50.0-59.9, ADULT (HCC): Primary | ICD-10-CM

## 2019-08-05 RX ORDER — METOCLOPRAMIDE 10 MG/1
10 TABLET ORAL EVERY 6 HOURS PRN
Qty: 30 TABLET | Refills: 0 | Status: SHIPPED | OUTPATIENT
Start: 2019-08-12 | End: 2019-08-27

## 2019-08-05 RX ORDER — ONDANSETRON 4 MG/1
4 TABLET, FILM COATED ORAL EVERY 4 HOURS PRN
Qty: 30 TABLET | Refills: 0 | Status: ON HOLD | OUTPATIENT
Start: 2019-08-12 | End: 2019-08-13 | Stop reason: HOSPADM

## 2019-08-05 RX ORDER — OMEPRAZOLE 40 MG/1
40 CAPSULE, DELAYED RELEASE ORAL DAILY
Qty: 30 CAPSULE | Refills: 2 | Status: SHIPPED | OUTPATIENT
Start: 2019-08-12 | End: 2019-11-03 | Stop reason: SDUPTHER

## 2019-08-05 RX ORDER — ASPIRIN 81 MG
100 TABLET, DELAYED RELEASE (ENTERIC COATED) ORAL 2 TIMES DAILY
Qty: 30 TABLET | Refills: 1 | Status: SHIPPED | OUTPATIENT
Start: 2019-08-12 | End: 2019-09-25

## 2019-08-05 NOTE — PROGRESS NOTES
In preparation for their surgical procedure above patient was screened for Obstructive Sleep Apnea (FREDY) using the STOP-Bang Questionnaire by the Pre-Admission Testing department. This is a pre-surgical screening tool for patient safety and serves as a recommendation, this WILL NOT cause cancellation of surgery. STOP-Bang Questionnaire  * Do you currently see a pulmonologist?  No     If yes STOP, do not complete. Patient follows with DrNirav     1.  Do you snore loudly (able to be heard in the next room)? No    2. Do you often feel tired or sleepy during the daytime? No       3. Has anyone ever told you that you stop breathing during your sleep? No    4. Do you have or are you being treated for high blood pressure? No      5. BMI more than 35? BMI (Calculated): 55.1        Yes    6. Age over 48 years? 54 y.o. No    7. Neck Circumference greater than 17 inches for male or 16 inches for female? Measured           (visits only)            Not Applicable    8. Gender Male? No      TOTAL SCORE: 1    FREDY - Low Risk : Yes to 0 - 2 questions  FREDY - Intermediate Risk : Yes to 3 - 4 questions  FREDY - High Risk : Yes to 5 - 8 questions    Adapted from:   STOP Questionnaire: A Tool to Screen Patients for Obstructive Sleep Apnea   CINDA Costa.P.C., La Mcdaniel, M.B.B.S., Shelia Benavides M.D., Maisha Aguilar. Rainer Milton, Ph.D., MIKE Seo.B.B.S., Sen Mcginnis M.Sc., Lulu Driscoll M.D., [facility]. DARIAN JacobsP.C.    Anesthesiology 2008; [id number]:741-62 Copyright 2008, the [address] of [employer]est[employer], [facility].   ----------------------------------------------------------------------------------------------------------------

## 2019-08-05 NOTE — PROGRESS NOTES
NPO after midnight  [employer] and drivers license  Wear comfortable clean clothing  Do not bring jewelry   Shower night before and  morning of surgery using [employer] kit provided  Bring medications in original bottles  Bring Binder  Instructed to bring IS  Bring comfortable shoes  Follow all instructions give by your physician

## 2019-08-06 ENCOUNTER — HOSPITAL ENCOUNTER (OUTPATIENT)
Dept: PHYSICAL THERAPY | Age: 49
Setting detail: THERAPIES SERIES
End: 2019-08-06
Payer: MEDICAID

## 2019-08-07 ENCOUNTER — HOSPITAL ENCOUNTER (OUTPATIENT)
Dept: PHYSICAL THERAPY | Age: 49
Setting detail: THERAPIES SERIES
Discharge: HOME | End: 2019-08-07
Payer: MEDICAID

## 2019-08-07 PROCEDURE — 97110 THERAPEUTIC EXERCISES: CPT

## 2019-08-07 PROCEDURE — 97140 MANUAL THERAPY 1/> REGIONS: CPT

## 2019-08-07 NOTE — PROGRESS NOTES
household mobility. Long term goals  Time Frame for Long term goals : 8 weeks  Long term goal 1: Pt will improve AROM right ankle df to neutral, pf to 60 deg, inversion to 30 deg and eversion to 10 deg for ease squatting and walking. Long term goal 2: Pt will improve right ankle strength to 4-/5 to tolerate SLS x3 sec to prevent falls when walking on uneven surfaces. Long term goal 3: Pt will ambulate without AD >50ft with minimal deviations/antalgia for household and short community mobility. Long term goal 4: Pt will be independent and compliant with HEP to achieve above goals.     Lucina Cole, PT

## 2019-08-09 ENCOUNTER — OFFICE VISIT (OUTPATIENT)
Dept: BARIATRICS/WEIGHT MGMT | Age: 49
End: 2019-08-09

## 2019-08-09 ENCOUNTER — HOSPITAL ENCOUNTER (OUTPATIENT)
Dept: PHYSICAL THERAPY | Age: 49
Setting detail: THERAPIES SERIES
Discharge: HOME | End: 2019-08-09
Payer: MEDICAID

## 2019-08-09 VITALS
HEART RATE: 80 BPM | WEIGHT: 267.8 LBS | HEIGHT: 60 IN | SYSTOLIC BLOOD PRESSURE: 116 MMHG | DIASTOLIC BLOOD PRESSURE: 80 MMHG | TEMPERATURE: 98.8 F | RESPIRATION RATE: 18 BRPM | BODY MASS INDEX: 52.58 KG/M2

## 2019-08-09 DIAGNOSIS — E66.01 MORBID OBESITY WITH BMI OF 50.0-59.9, ADULT (HCC): Primary | ICD-10-CM

## 2019-08-09 DIAGNOSIS — E78.5 HYPERLIPIDEMIA, UNSPECIFIED HYPERLIPIDEMIA TYPE: ICD-10-CM

## 2019-08-09 DIAGNOSIS — E55.9 VITAMIN D DEFICIENCY: ICD-10-CM

## 2019-08-09 DIAGNOSIS — M54.50 CHRONIC BILATERAL LOW BACK PAIN WITHOUT SCIATICA: ICD-10-CM

## 2019-08-09 DIAGNOSIS — G89.29 CHRONIC BILATERAL LOW BACK PAIN WITHOUT SCIATICA: ICD-10-CM

## 2019-08-09 DIAGNOSIS — K50.919 CROHN'S DISEASE WITH COMPLICATION, UNSPECIFIED GASTROINTESTINAL TRACT LOCATION (HCC): ICD-10-CM

## 2019-08-09 PROCEDURE — 97140 MANUAL THERAPY 1/> REGIONS: CPT

## 2019-08-09 PROCEDURE — 99024 POSTOP FOLLOW-UP VISIT: CPT | Performed by: SURGERY

## 2019-08-09 PROCEDURE — 97110 THERAPEUTIC EXERCISES: CPT

## 2019-08-10 ASSESSMENT — ENCOUNTER SYMPTOMS
COUGH: 0
CHEST TIGHTNESS: 0
TROUBLE SWALLOWING: 0
VOMITING: 0
APNEA: 0
ANAL BLEEDING: 0
SORE THROAT: 0
CONSTIPATION: 0
VOICE CHANGE: 0
SINUS PRESSURE: 0
NAUSEA: 0
EYE REDNESS: 0
ABDOMINAL PAIN: 0
EYE ITCHING: 0
FACIAL SWELLING: 0
RHINORRHEA: 0
RECTAL PAIN: 0
PHOTOPHOBIA: 0
ABDOMINAL DISTENTION: 0
DIARRHEA: 0
SHORTNESS OF BREATH: 0
BACK PAIN: 0
EYE PAIN: 0
WHEEZING: 0
STRIDOR: 0
CHOKING: 0
BLOOD IN STOOL: 0
EYE DISCHARGE: 0
COLOR CHANGE: 0
ALLERGIC/IMMUNOLOGIC NEGATIVE: 1

## 2019-08-10 NOTE — PROGRESS NOTES
Subjective:      Patient ID: Eduard Tate is a 54 y.o. female. Chief Complaint   Patient presents with    Follow-up     H&P Visit; initial 272lb, last 266lb, current 267.8lb     HPI  Daisy Zuniga is a 72-year-old female who presents for follow-up at the weight management program secondary to her morbid obesity. BMI 52. She has not been able to lose enough adequate excess body weight with medical management only and is proceeding with a robotic sleeve gastrectomy. She has completed psychology evaluation. Attended support groups. Completed upper endoscopy. Attended fitness orientation. Following with the dietitians. Trying to continue to improve with food selection and portion control. Denies current chest or abdominal pain. No new shortness of breath. No hematochezia or melena. No new urinary complaints. She states she is excited to proceed with surgery so is to continue to work towards her weight loss goals. Review of Systems   Constitutional: Positive for activity change and appetite change. Negative for chills, diaphoresis, fatigue, fever and unexpected weight change. HENT: Negative for congestion, dental problem, drooling, ear discharge, ear pain, facial swelling, hearing loss, mouth sores, nosebleeds, postnasal drip, rhinorrhea, sinus pressure, sneezing, sore throat, tinnitus, trouble swallowing and voice change. Eyes: Negative for photophobia, pain, discharge, redness, itching and visual disturbance. Respiratory: Negative for apnea, cough, choking, chest tightness, shortness of breath, wheezing and stridor. Cardiovascular: Negative for chest pain, palpitations and leg swelling. Gastrointestinal: Negative for abdominal distention, abdominal pain, anal bleeding, blood in stool, constipation, diarrhea, nausea, rectal pain and vomiting. Endocrine: Negative.     Genitourinary: Negative for decreased urine volume, difficulty urinating, dyspareunia, dysuria, enuresis, flank pain, frequency, quitting: 15.4    Smokeless tobacco: Never Used   Substance and Sexual Activity    Alcohol use: Yes     Comment: rare    Drug use: No    Sexual activity: Not on file   Lifestyle    Physical activity:     Days per week: Not on file     Minutes per session: Not on file    Stress: Not on file   Relationships    Social connections:     Talks on phone: Not on file     Gets together: Not on file     Attends Latter day service: Not on file     Active member of club or organization: Not on file     Attends meetings of clubs or organizations: Not on file     Relationship status: Not on file    Intimate partner violence:     Fear of current or ex partner: Not on file     Emotionally abused: Not on file     Physically abused: Not on file     Forced sexual activity: Not on file   Other Topics Concern    Not on file   Social History Narrative    Not on file     Body mass index is 52.3 kg/m². Wt Readings from Last 3 Encounters:   08/09/19 267 lb 12.8 oz (121.5 kg)   06/21/19 266 lb (120.7 kg)   06/21/19 266 lb (120.7 kg)     Vitals:    08/09/19 1018   BP: 116/80   Pulse: 80   Resp: 18   Temp: 98.8 °F (37.1 °C)     Objective:   Physical Exam   Constitutional: She is oriented to person, place, and time. She appears well-developed and well-nourished. No distress. HENT:   Head: Normocephalic and atraumatic. Right Ear: External ear normal.   Left Ear: External ear normal.   Nose: Nose normal.   Mouth/Throat: Oropharynx is clear and moist.   Eyes: Conjunctivae and EOM are normal. Right eye exhibits no discharge. Left eye exhibits no discharge. No scleral icterus. Neck: Normal range of motion. Neck supple. Cardiovascular: Normal rate, regular rhythm, normal heart sounds and intact distal pulses. Pulmonary/Chest: Effort normal and breath sounds normal. No respiratory distress. She has no wheezes. She has no rales. She exhibits no tenderness. Abdominal: Soft.  Bowel sounds are normal. She exhibits no distension and extending  beyond the Z-line, consistent with [facility] grade B esophagitis,  noted as shown in picture #A2 and #A8.  Biopsies obtained to rule out  Ramirez epithelium.  Proximal esophagus looks normal as shown in picture  #A1. Veda Giles was withdrawn as the scope was removed.  The patient tolerated  the procedure well without any immediate complications.  Vitals  including blood pressure, heart rate and pulse ox were stable during the  procedure and after the procedure.  The patient was transferred to the  recovery room in stable condition.     IMPRESSION:  Esophagogastroduodenoscopy to the second portion of  duodenum.  Multiple biopsies obtained from the antrum of the stomach to  rule out H. pylori.  Grade 2 esophagitis or [place] B esophagitis  noted.  Biopsies obtained to rule out Ramirez epithelium.     MY RECOMMENDATION:  Antireflux instructions.  Protonix 40 mg p.o. q.a.m. Call my office in less than two weeks for the biopsy results.  Okay to  proceed with gastric sleeve operation as planned by Dr. Leonardo Salcedo.     Thank you Dr. Leonardo Salcedo. and Jamari Mcneil for letting me to do  procedure on the patient.  Do not hesitate to call me if you have any  questions.  My recommendations are above.           Flor Valdovinos M.D.     [id number]. Pylori 02/18/2019  9:57 AM  - [facility]   Negative       PATHOLOGY REPORT                      ATTN: MIR MORENO                      REQ: MIR MORENO      Copies To:   JOSE LUIS NOONAN      Clinical Information: PRE PROCEDURE PROTOCOL    FINAL DIAGNOSIS:  Distal esophagus, biopsy:   Mild chronic reflux esophagitis and carditis.  Negative for dysplasia. Specimen:  BIOPSY OF DISTAL ESOPHAGUS, LOWER, R/O RAMIREZ'S      Gross Examination:  The container is labeled Kasey Cosme, biopsy of distal esophagus, rule  out Ramirez's.  Received in formalin are three bits of tan tissue  varying in size from 1 mm up to 3 mm.  1 ns.  ALP/DKR:[id number]    Microscopic Examination:  The specimen consists of squamous and gastric mucosa.  The sections  demonstrate a mild chronic inflammatory cell infiltrate and features  consistent with reflux. Curvin Reza is no intestinal metaplasia or dysplasia  recognized. [id number]                                                    <Sign Out Dr. Nicolle Robles M.D., F.C.A.P.         Patient Active Problem List   Diagnosis    Hyperlipidemia     Assessment:      1.  Morbid obesity (BMI 52)  2.  Hypercholesterolemia  3.  Chronic neck pain  4.  Chronic lower back pain  5.  Crohn's disease  6. Vitamin D deficiency      Plan:      1. Discussion again about the pros and cons of weight loss surgery.  The risks benefits and alternatives to laparoscopic adjustable band, gastric sleeve and gastric Ken-en-Y bypass were discussed in detail.  The pros and cons of robotic assisted, laparoscopic and open techniques were discussed. 2.  Behavior modification discussed again in regards to dietary habits. 3.  Nutritional education occurred during visit.  Follow up with dietitian for further evaluation -continue to follow their recommendations as directed. 4.  Options for medical management of morbid obesity discussed. 5.  Improvement in fitness/exercise discussed with patient and the need for this with/without surgery. 6.  Medical necessity letter from PCP. 7.  Follow-up in one week after surgery in the weight management program at [address] [place] [address]. 8.  Signs and symptoms reviewed with patient that would be concerning and need her to return to office for re-evaluation. Patient states she will call if she has questions or concerns.   9. Multivitamin  10.  Psychology evaluation completed. Follow-up as needed. 6.  EGD pleaded. Results reviewed. All questions answered. 12.  Encouraged support groups  13. Misti Buckley for robotic sleeve gastrectomy. 14.  She has undergone pre-operative clearance per anesthesia guidelines with risk factors listed under the past medical history diagnosis & problem list.  15. The risks, benefits and alternatives were discussed with Susanne Brice including non-operative management. The pros and cons of robotic, laparoscopic and open techniques were discussed. All questions answered. She understands and wishes to proceed with surgical intervention. 16. Restrictions discussed with Susanne Brice and she expresses understanding. 17.  She is advised to call back directly if there are further questions/concerns, or if her symptoms worsen prior to surgery.         Patient states that she has not been able to lose enough adequate excess body weight with medical management only and would like to proceed with a robotic sleeve gastrectomy for further weight loss.           Petrona Bhatt MD

## 2019-08-10 NOTE — H&P
CHIEF COMPLAINT:   Chief Complaint   Patient presents with   • Pain       Nursing notes reviewed.    HISTORY OF PRESENT ILLNESS: Warren Rico is a 56 year old male presenting with right hip and leg pain.  Pt is status post right TKA 5/2/18. He has completed physical therapy and states that he is still performing exercises at home. He has been having some increasing pain at the right hip, but also in the right lower back, buttock, thigh extending to knee and lateral aspect of the right foot. He does have associated numbness on the lateral aspect of the right foot, which she states has been unchanged since his back surgery.  Pain is worsen with activity and admits aching at night when hard to find comfortable position to sleep in.  Pain in the hip is felt in the buttock and lateral aspect, worse with laying on right side.  Admits groin pain intermittently, but not as experienced prior to SHY.    No medications have been tried for pain. States has not needed pain medications since postoperative period.   Hx of DDD with lumbar fusion surgery in 1993. He had ongoing right radicular pain prior to the surgery and feels this may be similar. The numbness along foot has been unchanged since back surgery.  Has not felt worsened recently, but does admit the radicular pain to the foot is new.  Denies any known injury.   Has returned to work, but has been taking it easily.  Plans to take off for the next 1-2 weeks as well. Has not been able to lose weight, actually gained weight recently.  Has f/u appt with Dr. Toussaint on 9/17/18. Dr. Toussaint has placed orders for him to have updated hip xray today after visit.  Also notes right wrist/thumb pain.  Ongoing intermittently, worsened recently. Admits pain with increased use of the wrist and thumb. Denies numbness, tingling, weakness. No treatments have been tried.    ALLERGIES:  No Known Allergies    Current Outpatient Prescriptions   Medication Sig Dispense Refill   • esomeprazole  Subjective:      Patient ID: Oz Laguerre is a 54 y.o. female.          Chief Complaint   Patient presents with    Follow-up       H&P Visit; initial 272lb, last 266lb, current 267.8lb      HPI  [facility] is a 82-year-old female who presents for follow-up at the weight management program secondary to her morbid obesity. BMI 52. She has not been able to lose enough adequate excess body weight with medical management only and is proceeding with a robotic sleeve gastrectomy. She has completed psychology evaluation. Attended support groups. Completed upper endoscopy. Attended fitness orientation. Following with the dietitians. Trying to continue to improve with food selection and portion control. Denies current chest or abdominal pain. No new shortness of breath. No hematochezia or melena. No new urinary complaints. She states she is excited to proceed with surgery so is to continue to work towards her weight loss goals.     Review of Systems   Constitutional: Positive for activity change and appetite change. Negative for chills, diaphoresis, fatigue, fever and unexpected weight change. HENT: Negative for congestion, dental problem, drooling, ear discharge, ear pain, facial swelling, hearing loss, mouth sores, nosebleeds, postnasal drip, rhinorrhea, sinus pressure, sneezing, sore throat, tinnitus, trouble swallowing and voice change. Eyes: Negative for photophobia, pain, discharge, redness, itching and visual disturbance. Respiratory: Negative for apnea, cough, choking, chest tightness, shortness of breath, wheezing and stridor. Cardiovascular: Negative for chest pain, palpitations and leg swelling. Gastrointestinal: Negative for abdominal distention, abdominal pain, anal bleeding, blood in stool, constipation, diarrhea, nausea, rectal pain and vomiting. Endocrine: Negative.     Genitourinary: Negative for decreased urine volume, difficulty urinating, dyspareunia, dysuria, enuresis, flank pain, (NEXIUM) 40 MG capsule Take 1 capsule by mouth daily (before breakfast). 90 capsule 1   • losartan (COZAAR) 50 MG tablet TAKE 1 TABLET DAILY 90 tablet 1   • LORazepam (ATIVAN) 0.5 MG tablet TAKE 1 TABLET BY MOUTH EVERY 6 HOURS AS NEEDED FOR ANXIETY 30 tablet 0   • triamterene-hydroCHLOROthiazide (MAXZIDE-25) 37.5-25 MG per tablet TAKE 1 TABLET DAILY 90 tablet 0   • acetaminophen (TYLENOL) 500 MG tablet Take 2 tablets by mouth every 8 hours. 30 tablet 0   • senna (SENOKOT) 8.6 MG tablet Take 1 tablet by mouth nightly. 30 tablet 1   • mupirocin (BACTROBAN) 2 % ointment Apply ointment with a Q tip to both nostrils 2 times a day 5 days prior to surgery and for 5 days after surgery. 22 g 0     No current facility-administered medications for this visit.        Past Medical History:   Diagnosis Date   • Arthritis, hip     right   • DDD (degenerative disc disease), lumbar     hx of fusion   • Edema extremities     dependent   • GERD (gastroesophageal reflux disease)    • HTN (hypertension)    • Psoriasis        Social History     Social History   • Marital status:      Spouse name: N/A   • Number of children: N/A   • Years of education: N/A     Occupational History   • construction      Social History Main Topics   • Smoking status: Never Smoker   • Smokeless tobacco: Never Used   • Alcohol use No   • Drug use: No   • Sexual activity: Not on file     Other Topics Concern   • Not on file     Social History Narrative   • No narrative on file       REVIEW OF SYSTEMS:  As noted in history of present illness    PHYSICAL EXAM:   Visit Vitals  /86   Pulse 88   Wt (!) 150 kg   BMI 44.85 kg/m²   GEN: well appearing, obese, no acute distress, sitting comfortably in chair and able to get onto examination table and into supine position without difficulty  EXTREMITIES: Full range of motion bilateral lower extremities, no erythema, no edema. Normal peripheral pulses.  ROM of right hip with gentle internal/external rotation  frequency, genital sores, hematuria, menstrual problem, pelvic pain, urgency, vaginal bleeding, vaginal discharge and vaginal pain. Musculoskeletal: Negative for arthralgias, back pain, gait problem, joint swelling, myalgias, neck pain and neck stiffness. Skin: Negative for color change, pallor, rash and wound. Allergic/Immunologic: Negative. Neurological: Negative for dizziness, tremors, seizures, syncope, facial asymmetry, speech difficulty, weakness, light-headedness, numbness and headaches. Hematological: Negative for adenopathy. Does not bruise/bleed easily. Psychiatric/Behavioral: Negative for agitation, behavioral problems, confusion, decreased concentration, dysphoric mood, hallucinations, self-injury, sleep disturbance and suicidal ideas.  The patient is not nervous/anxious and is not hyperactive.          Past Medical History        Past Medical History:   Diagnosis Date    Arthritis      Hyperlipidemia              Past Surgical History         Past Surgical History:   Procedure Laterality Date    ANKLE SURGERY Left 2006    ANKLE SURGERY Right 03/2019     plate and screws    COLONOSCOPY        FRACTURE SURGERY        HYSTERECTOMY, TOTAL ABDOMINAL        HYSTEROPLASTY        UPPER GASTROINTESTINAL ENDOSCOPY N/A 2/18/2019     EGD WITH BX performed by Gabrielle Bloom MD at [facility]    WRIST SURGERY Right 2014     plate and screws            Current Facility-Administered Medications          Current Outpatient Medications   Medication Sig Dispense Refill    Calcium Citrate-Vitamin D (CELEBRATE CALCIUM CITRATE PO) Take by mouth 2 times daily powder        Multiple Vitamins-Minerals (MULTIVITAMIN ADULT PO) Take by mouth daily Liquid        Cholecalciferol (VITAMIN D3) 2000 units TABS Take by mouth daily        atorvastatin (LIPITOR) 40 MG tablet Take 40 mg by mouth nightly         Eluxadoline (VIBERZI) 75 MG TABS Take 0.5 tablets by mouth daily.        Iron Carbonyl-Vitamin without pain.  Tenderness to palpation of the right hip laterally while in seated position, which does reproduce some of the discomfort pt is experiencing. Right wrist with full ROM, tenderness to palpation radial styloid and base of right thumb, no tenderness at anatomic snuff box, + Finkelstein's.  NEUROLOGICAL: grossly intact, except slightly decreased sensation along the lateral aspect of right plantar foot, unchanged per patient.  Normal strength, deep tendon reflexes 2+/4+.  BACK:  No tenderness along spinous processes or SI joint bilaterally. Admits some discomfort with palpation at the right sciatic notch.  Admits some discomfort into right buttock with straight leg raise, but also into hamstring; negative straight leg raise on right.       IMPRESSION:   Right leg pain  Extending from the right lower back and buttock to the right lateral hip, right thigh, lateral aspect of right knee and down to the right foot. Patient is tender at the lateral aspect of the hip in the seated position, therefore a bursitis may be leading to his symptoms, although radiculopathy from his back is more likely. Will obtain x-ray of hip and f/u with Dr. Toussaint as planned below.  If normal x-ray will treat acute radiculopathy with medrol dosepak and further follow up/intervention pending response to treatment.    Status post total replacement of right hip  With continued pain at the hip, although this is along the lateral aspect, not into the groin as experienced prior to SHY. Possible bursitis vs lumbar radiculopathy as noted below  X-ray today as planned.  F/U with Dr. Toussaint on 9/17/18 as planned.   Encouraged continued PT exercises at home and weight loss.     DDD (degenerative disc disease), lumbar  Hx of lumbar fusion 1993 with hx of right radiculopathy.  Pending review of x-ray, will treat with Medrol dosepak and Tylenol ES 2 tablets 3 times daily if needed.  Reviewed risks/side effects of steroid use if should be given, pt  agreeable.  Further intervention/follow up pending response to treatment.    Radial styloid tenosynovitis  Discussed thumb spica wrist bracing, Medrol and tylenol as noted.  Recommended ice when bothersome. Reviewed trigger activities to limit.  Notify if persistent, consider Ortho referral for injection.     Need for influenza vaccination  - INFLUENZA QUADRIVALENT SPLIT 0.5 ML VACC, IM    Supervising physician Dr. Kj Rodrigez.    The antrum of the stomach looks  normal as shown in picture #A5.  Biopsies obtained for CLOtest.  Lesser  and greater curvature and body of the stomach look normal as shown in  picture #A6.  On retroflex, fundus looks normal as shown in picture #A7. In the distal esophagus, the patient has erythema and edema extending  beyond the Z-line, consistent with [facility] grade B esophagitis,  noted as shown in picture #A2 and #A8.  Biopsies obtained to rule out  King epithelium.  Proximal esophagus looks normal as shown in picture  #A1. Ethlyn Nipple was withdrawn as the scope was removed.  The patient tolerated  the procedure well without any immediate complications.  Vitals  including blood pressure, heart rate and pulse ox were stable during the  procedure and after the procedure.  The patient was transferred to the  recovery room in stable condition.     IMPRESSION:  Esophagogastroduodenoscopy to the second portion of  duodenum.  Multiple biopsies obtained from the antrum of the stomach to  rule out H. pylori.  Grade 2 esophagitis or [place] B esophagitis  noted.  Biopsies obtained to rule out King epithelium.     MY RECOMMENDATION:  Antireflux instructions.  Protonix 40 mg p.o. q.a.m. Call my office in less than two weeks for the biopsy results.  Okay to  proceed with gastric sleeve operation as planned by Dr. José Wilcox.     Thank you Dr. José Wilcox. and Elias Church for letting me to do  procedure on the patient.  Do not hesitate to call me if you have any  questions.  My recommendations are above.           Waldemar Hair M.D.     [id number].  Pylori 02/18/2019  9:57 AM  - [facility]   Negative       PATHOLOGY REPORT                      ATTN: MIR MORENO                      REQ: MIR MORENO      Copies To:   JOSE LUIS NOONAN      Clinical Information: PRE PROCEDURE PROTOCOL    FINAL DIAGNOSIS:  Distal esophagus, biopsy:   Mild chronic reflux esophagitis and carditis.  Negative for dysplasia. Specimen:  BIOPSY OF DISTAL ESOPHAGUS, LOWER, R/O RAMIREZ'S      Gross Examination:  The container is labeled Neda Gilbert, biopsy of distal esophagus, rule  out Ramirez's.  Received in formalin are three bits of tan tissue  varying in size from 1 mm up to 3 mm.  1 ns.  ALP/DKR:[id number]    Microscopic Examination:  The specimen consists of squamous and gastric mucosa.  The sections  demonstrate a mild chronic inflammatory cell infiltrate and features  consistent with reflux. Alcira Lie is no intestinal metaplasia or dysplasia  recognized. Reinaldo Ratliff                                          <Sign Out Dr. Darrin Venegas M.D., F.C.A.P.               Patient Active Problem List   Diagnosis    Hyperlipidemia      Assessment:   1.  Morbid obesity (BMI 52)  2.  Hypercholesterolemia  3.  Chronic neck pain  4.  Chronic lower back pain  5.  Crohn's disease  6.  Vitamin D deficiency                Plan:   1. Discussion again about the pros and cons of weight loss surgery.  The risks benefits and alternatives to laparoscopic adjustable band, gastric sleeve and gastric Ken-en-Y bypass were discussed in detail.  The pros and cons of robotic assisted, laparoscopic and open techniques were discussed. 2.  Behavior modification discussed again in regards to dietary habits. 3.  Nutritional education occurred during visit.  Follow up with dietitian for further evaluation -continue to follow their recommendations as directed.   4.  Options for medical management of morbid obesity discussed. 5.  Improvement in fitness/exercise discussed with patient and the need for this with/without surgery. 6.  Medical necessity letter from PCP. 7.  Follow-up in one week after surgery in the weight management program at [facility]. 8.  Signs and symptoms reviewed with patient that would be concerning and need her to return to office for re-evaluation.

## 2019-08-12 ENCOUNTER — HOSPITAL ENCOUNTER (INPATIENT)
Age: 49
LOS: 2 days | Discharge: HOME HEALTH | DRG: 403 | End: 2019-08-14
Attending: SURGERY | Admitting: SURGERY
Payer: MEDICAID

## 2019-08-12 ENCOUNTER — ANESTHESIA EVENT (OUTPATIENT)
Dept: OPERATING ROOM | Age: 49
DRG: 403 | End: 2019-08-12
Payer: MEDICAID

## 2019-08-12 ENCOUNTER — ANESTHESIA (OUTPATIENT)
Dept: OPERATING ROOM | Age: 49
DRG: 403 | End: 2019-08-12
Payer: MEDICAID

## 2019-08-12 VITALS
DIASTOLIC BLOOD PRESSURE: 73 MMHG | OXYGEN SATURATION: 92 % | SYSTOLIC BLOOD PRESSURE: 147 MMHG | RESPIRATION RATE: 1 BRPM | TEMPERATURE: 95.9 F

## 2019-08-12 DIAGNOSIS — Z98.84 STATUS POST LAPAROSCOPIC SLEEVE GASTRECTOMY: Primary | ICD-10-CM

## 2019-08-12 PROBLEM — E66.9 OBESITY: Status: ACTIVE | Noted: 2019-08-12

## 2019-08-12 PROCEDURE — 3700000000 HC ANESTHESIA ATTENDED CARE: Performed by: SURGERY

## 2019-08-12 PROCEDURE — 6370000000 HC RX 637 (ALT 250 FOR IP): Performed by: SURGERY

## 2019-08-12 PROCEDURE — 94761 N-INVAS EAR/PLS OXIMETRY MLT: CPT

## 2019-08-12 PROCEDURE — 2580000003 HC RX 258: Performed by: NURSE ANESTHETIST, CERTIFIED REGISTERED

## 2019-08-12 PROCEDURE — 2780000010 HC IMPLANT OTHER: Performed by: SURGERY

## 2019-08-12 PROCEDURE — 6360000002 HC RX W HCPCS: Performed by: SURGERY

## 2019-08-12 PROCEDURE — 8E0W4CZ ROBOTIC ASSISTED PROCEDURE OF TRUNK REGION, PERCUTANEOUS ENDOSCOPIC APPROACH: ICD-10-PCS | Performed by: SURGERY

## 2019-08-12 PROCEDURE — 88300 SURGICAL PATH GROSS: CPT

## 2019-08-12 PROCEDURE — 2580000003 HC RX 258: Performed by: SURGERY

## 2019-08-12 PROCEDURE — 43775 LAP SLEEVE GASTRECTOMY: CPT | Performed by: SURGERY

## 2019-08-12 PROCEDURE — 3700000001 HC ADD 15 MINUTES (ANESTHESIA): Performed by: SURGERY

## 2019-08-12 PROCEDURE — 2500000003 HC RX 250 WO HCPCS: Performed by: SURGERY

## 2019-08-12 PROCEDURE — 6360000002 HC RX W HCPCS: Performed by: NURSE ANESTHETIST, CERTIFIED REGISTERED

## 2019-08-12 PROCEDURE — 2709999900 HC NON-CHARGEABLE SUPPLY: Performed by: SURGERY

## 2019-08-12 PROCEDURE — 7100000001 HC PACU RECOVERY - ADDTL 15 MIN: Performed by: SURGERY

## 2019-08-12 PROCEDURE — 2500000003 HC RX 250 WO HCPCS: Performed by: NURSE ANESTHETIST, CERTIFIED REGISTERED

## 2019-08-12 PROCEDURE — 2720000010 HC SURG SUPPLY STERILE: Performed by: SURGERY

## 2019-08-12 PROCEDURE — 3600000009 HC SURGERY ROBOT BASE: Performed by: SURGERY

## 2019-08-12 PROCEDURE — S2900 ROBOTIC SURGICAL SYSTEM: HCPCS | Performed by: SURGERY

## 2019-08-12 PROCEDURE — 2700000000 HC OXYGEN THERAPY PER DAY

## 2019-08-12 PROCEDURE — 0DB64Z3 EXCISION OF STOMACH, PERCUTANEOUS ENDOSCOPIC APPROACH, VERTICAL: ICD-10-PCS | Performed by: SURGERY

## 2019-08-12 PROCEDURE — 7100000000 HC PACU RECOVERY - FIRST 15 MIN: Performed by: SURGERY

## 2019-08-12 PROCEDURE — 1200000000 HC SEMI PRIVATE

## 2019-08-12 PROCEDURE — 6360000002 HC RX W HCPCS: Performed by: ANESTHESIOLOGY

## 2019-08-12 PROCEDURE — C9113 INJ PANTOPRAZOLE SODIUM, VIA: HCPCS | Performed by: SURGERY

## 2019-08-12 PROCEDURE — 3600000019 HC SURGERY ROBOT ADDTL 15MIN: Performed by: SURGERY

## 2019-08-12 DEVICE — SEALANT HEMSTAT 5ML HUM FIBRIN THROM 2 VI APPL DEV EVICEL: Type: IMPLANTABLE DEVICE | Site: ABDOMEN | Status: FUNCTIONAL

## 2019-08-12 RX ORDER — ONDANSETRON 2 MG/ML
INJECTION INTRAMUSCULAR; INTRAVENOUS PRN
Status: DISCONTINUED | OUTPATIENT
Start: 2019-08-12 | End: 2019-08-12 | Stop reason: SDUPTHER

## 2019-08-12 RX ORDER — PROPOFOL 10 MG/ML
INJECTION, EMULSION INTRAVENOUS PRN
Status: DISCONTINUED | OUTPATIENT
Start: 2019-08-12 | End: 2019-08-12 | Stop reason: SDUPTHER

## 2019-08-12 RX ORDER — PROMETHAZINE HYDROCHLORIDE 25 MG/ML
6.25 INJECTION, SOLUTION INTRAMUSCULAR; INTRAVENOUS EVERY 6 HOURS PRN
Status: DISCONTINUED | OUTPATIENT
Start: 2019-08-12 | End: 2019-08-14 | Stop reason: HOSPADM

## 2019-08-12 RX ORDER — PROMETHAZINE HYDROCHLORIDE 25 MG/1
25 SUPPOSITORY RECTAL EVERY 6 HOURS PRN
Status: DISCONTINUED | OUTPATIENT
Start: 2019-08-12 | End: 2019-08-14 | Stop reason: HOSPADM

## 2019-08-12 RX ORDER — ONDANSETRON 2 MG/ML
4 INJECTION INTRAMUSCULAR; INTRAVENOUS EVERY 6 HOURS
Status: DISCONTINUED | OUTPATIENT
Start: 2019-08-12 | End: 2019-08-14 | Stop reason: HOSPADM

## 2019-08-12 RX ORDER — LIDOCAINE HYDROCHLORIDE 20 MG/ML
INJECTION, SOLUTION INFILTRATION; PERINEURAL PRN
Status: DISCONTINUED | OUTPATIENT
Start: 2019-08-12 | End: 2019-08-12 | Stop reason: SDUPTHER

## 2019-08-12 RX ORDER — SODIUM CHLORIDE 9 MG/ML
INJECTION, SOLUTION INTRAVENOUS CONTINUOUS PRN
Status: DISCONTINUED | OUTPATIENT
Start: 2019-08-12 | End: 2019-08-12 | Stop reason: SDUPTHER

## 2019-08-12 RX ORDER — SODIUM CHLORIDE 0.9 % (FLUSH) 0.9 %
10 SYRINGE (ML) INJECTION EVERY 12 HOURS SCHEDULED
Status: DISCONTINUED | OUTPATIENT
Start: 2019-08-12 | End: 2019-08-14 | Stop reason: HOSPADM

## 2019-08-12 RX ORDER — MIDAZOLAM HYDROCHLORIDE 1 MG/ML
INJECTION INTRAMUSCULAR; INTRAVENOUS PRN
Status: DISCONTINUED | OUTPATIENT
Start: 2019-08-12 | End: 2019-08-12 | Stop reason: SDUPTHER

## 2019-08-12 RX ORDER — NEOSTIGMINE METHYLSULFATE 1 MG/ML
INJECTION, SOLUTION INTRAVENOUS PRN
Status: DISCONTINUED | OUTPATIENT
Start: 2019-08-12 | End: 2019-08-12 | Stop reason: SDUPTHER

## 2019-08-12 RX ORDER — PROMETHAZINE HYDROCHLORIDE 25 MG/ML
12.5 INJECTION, SOLUTION INTRAMUSCULAR; INTRAVENOUS
Status: COMPLETED | OUTPATIENT
Start: 2019-08-12 | End: 2019-08-12

## 2019-08-12 RX ORDER — LABETALOL 20 MG/4 ML (5 MG/ML) INTRAVENOUS SYRINGE
10 EVERY 10 MIN PRN
Status: DISCONTINUED | OUTPATIENT
Start: 2019-08-12 | End: 2019-08-12 | Stop reason: HOSPADM

## 2019-08-12 RX ORDER — SCOLOPAMINE TRANSDERMAL SYSTEM 1 MG/1
1 PATCH, EXTENDED RELEASE TRANSDERMAL
Status: DISCONTINUED | OUTPATIENT
Start: 2019-08-12 | End: 2019-08-14 | Stop reason: HOSPADM

## 2019-08-12 RX ORDER — LABETALOL 20 MG/4 ML (5 MG/ML) INTRAVENOUS SYRINGE
PRN
Status: DISCONTINUED | OUTPATIENT
Start: 2019-08-12 | End: 2019-08-12 | Stop reason: SDUPTHER

## 2019-08-12 RX ORDER — SODIUM CHLORIDE 0.9 % (FLUSH) 0.9 %
10 SYRINGE (ML) INJECTION PRN
Status: DISCONTINUED | OUTPATIENT
Start: 2019-08-12 | End: 2019-08-12 | Stop reason: HOSPADM

## 2019-08-12 RX ORDER — SCOLOPAMINE TRANSDERMAL SYSTEM 1 MG/1
1 PATCH, EXTENDED RELEASE TRANSDERMAL
Status: DISCONTINUED | OUTPATIENT
Start: 2019-08-12 | End: 2019-08-12

## 2019-08-12 RX ORDER — MORPHINE SULFATE 2 MG/ML
2 INJECTION, SOLUTION INTRAMUSCULAR; INTRAVENOUS
Status: DISCONTINUED | OUTPATIENT
Start: 2019-08-12 | End: 2019-08-14 | Stop reason: HOSPADM

## 2019-08-12 RX ORDER — ONDANSETRON 2 MG/ML
4 INJECTION INTRAMUSCULAR; INTRAVENOUS ONCE
Status: COMPLETED | OUTPATIENT
Start: 2019-08-12 | End: 2019-08-12

## 2019-08-12 RX ORDER — 0.9 % SODIUM CHLORIDE 0.9 %
10 VIAL (ML) INJECTION DAILY
Status: DISCONTINUED | OUTPATIENT
Start: 2019-08-12 | End: 2019-08-14 | Stop reason: HOSPADM

## 2019-08-12 RX ORDER — MORPHINE SULFATE 4 MG/ML
4 INJECTION, SOLUTION INTRAMUSCULAR; INTRAVENOUS
Status: DISCONTINUED | OUTPATIENT
Start: 2019-08-12 | End: 2019-08-14 | Stop reason: HOSPADM

## 2019-08-12 RX ORDER — PANTOPRAZOLE SODIUM 40 MG/10ML
40 INJECTION, POWDER, LYOPHILIZED, FOR SOLUTION INTRAVENOUS DAILY
Status: DISCONTINUED | OUTPATIENT
Start: 2019-08-12 | End: 2019-08-14 | Stop reason: HOSPADM

## 2019-08-12 RX ORDER — BUPIVACAINE HYDROCHLORIDE AND EPINEPHRINE 5; 5 MG/ML; UG/ML
INJECTION, SOLUTION EPIDURAL; INTRACAUDAL; PERINEURAL PRN
Status: DISCONTINUED | OUTPATIENT
Start: 2019-08-12 | End: 2019-08-12 | Stop reason: ALTCHOICE

## 2019-08-12 RX ORDER — ROCURONIUM BROMIDE 10 MG/ML
INJECTION, SOLUTION INTRAVENOUS PRN
Status: DISCONTINUED | OUTPATIENT
Start: 2019-08-12 | End: 2019-08-12 | Stop reason: SDUPTHER

## 2019-08-12 RX ORDER — SODIUM CHLORIDE 9 MG/ML
INJECTION, SOLUTION INTRAVENOUS CONTINUOUS
Status: DISCONTINUED | OUTPATIENT
Start: 2019-08-12 | End: 2019-08-14 | Stop reason: HOSPADM

## 2019-08-12 RX ORDER — FENTANYL CITRATE 50 UG/ML
50 INJECTION, SOLUTION INTRAMUSCULAR; INTRAVENOUS EVERY 5 MIN PRN
Status: DISCONTINUED | OUTPATIENT
Start: 2019-08-12 | End: 2019-08-12 | Stop reason: HOSPADM

## 2019-08-12 RX ORDER — HYOSCYAMINE SULFATE 0.125 MG
125 TABLET,DISINTEGRATING ORAL EVERY 4 HOURS PRN
Status: DISCONTINUED | OUTPATIENT
Start: 2019-08-12 | End: 2019-08-14

## 2019-08-12 RX ORDER — SODIUM CHLORIDE 0.9 % (FLUSH) 0.9 %
10 SYRINGE (ML) INJECTION EVERY 12 HOURS SCHEDULED
Status: DISCONTINUED | OUTPATIENT
Start: 2019-08-12 | End: 2019-08-12 | Stop reason: HOSPADM

## 2019-08-12 RX ORDER — FENTANYL CITRATE 50 UG/ML
INJECTION, SOLUTION INTRAMUSCULAR; INTRAVENOUS PRN
Status: DISCONTINUED | OUTPATIENT
Start: 2019-08-12 | End: 2019-08-12 | Stop reason: SDUPTHER

## 2019-08-12 RX ORDER — METOCLOPRAMIDE HYDROCHLORIDE 5 MG/ML
10 INJECTION INTRAMUSCULAR; INTRAVENOUS EVERY 6 HOURS PRN
Status: DISCONTINUED | OUTPATIENT
Start: 2019-08-12 | End: 2019-08-14 | Stop reason: HOSPADM

## 2019-08-12 RX ORDER — SODIUM CHLORIDE 0.9 % (FLUSH) 0.9 %
10 SYRINGE (ML) INJECTION PRN
Status: DISCONTINUED | OUTPATIENT
Start: 2019-08-12 | End: 2019-08-14 | Stop reason: HOSPADM

## 2019-08-12 RX ORDER — SUCCINYLCHOLINE/SOD CL,ISO/PF 200MG/10ML
SYRINGE (ML) INTRAVENOUS PRN
Status: DISCONTINUED | OUTPATIENT
Start: 2019-08-12 | End: 2019-08-12 | Stop reason: SDUPTHER

## 2019-08-12 RX ORDER — DEXAMETHASONE SODIUM PHOSPHATE 4 MG/ML
INJECTION, SOLUTION INTRA-ARTICULAR; INTRALESIONAL; INTRAMUSCULAR; INTRAVENOUS; SOFT TISSUE PRN
Status: DISCONTINUED | OUTPATIENT
Start: 2019-08-12 | End: 2019-08-12 | Stop reason: SDUPTHER

## 2019-08-12 RX ORDER — SODIUM CHLORIDE 9 MG/ML
INJECTION, SOLUTION INTRAVENOUS CONTINUOUS
Status: DISCONTINUED | OUTPATIENT
Start: 2019-08-12 | End: 2019-08-12

## 2019-08-12 RX ORDER — GLYCOPYRROLATE 1 MG/5 ML
SYRINGE (ML) INTRAVENOUS PRN
Status: DISCONTINUED | OUTPATIENT
Start: 2019-08-12 | End: 2019-08-12 | Stop reason: SDUPTHER

## 2019-08-12 RX ADMIN — PROPOFOL 60 MG: 10 INJECTION, EMULSION INTRAVENOUS at 15:16

## 2019-08-12 RX ADMIN — HYDROMORPHONE HYDROCHLORIDE 0.25 MG: 1 INJECTION, SOLUTION INTRAMUSCULAR; INTRAVENOUS; SUBCUTANEOUS at 16:50

## 2019-08-12 RX ADMIN — HYDROMORPHONE HYDROCHLORIDE 0.25 MG: 1 INJECTION, SOLUTION INTRAMUSCULAR; INTRAVENOUS; SUBCUTANEOUS at 16:55

## 2019-08-12 RX ADMIN — Medication 140 MG: at 14:55

## 2019-08-12 RX ADMIN — PROMETHAZINE HYDROCHLORIDE 12.5 MG: 25 INJECTION INTRAMUSCULAR; INTRAVENOUS at 16:30

## 2019-08-12 RX ADMIN — FENTANYL CITRATE 100 MCG: 50 INJECTION INTRAMUSCULAR; INTRAVENOUS at 14:55

## 2019-08-12 RX ADMIN — PROPOFOL 200 MG: 10 INJECTION, EMULSION INTRAVENOUS at 14:55

## 2019-08-12 RX ADMIN — LABETALOL 20 MG/4 ML (5 MG/ML) INTRAVENOUS SYRINGE 5 MG: at 15:32

## 2019-08-12 RX ADMIN — PROPOFOL 60 MG: 10 INJECTION, EMULSION INTRAVENOUS at 15:05

## 2019-08-12 RX ADMIN — FENTANYL CITRATE 125 MCG: 50 INJECTION INTRAMUSCULAR; INTRAVENOUS at 15:16

## 2019-08-12 RX ADMIN — ONDANSETRON HYDROCHLORIDE 4 MG: 4 INJECTION, SOLUTION INTRAMUSCULAR; INTRAVENOUS at 15:00

## 2019-08-12 RX ADMIN — HYDROMORPHONE HYDROCHLORIDE 0.25 MG: 1 INJECTION, SOLUTION INTRAMUSCULAR; INTRAVENOUS; SUBCUTANEOUS at 16:40

## 2019-08-12 RX ADMIN — Medication 10 ML: at 22:36

## 2019-08-12 RX ADMIN — LIDOCAINE HYDROCHLORIDE 60 MG: 20 INJECTION, SOLUTION INFILTRATION; PERINEURAL at 14:55

## 2019-08-12 RX ADMIN — ONDANSETRON 4 MG: 2 INJECTION INTRAMUSCULAR; INTRAVENOUS at 20:57

## 2019-08-12 RX ADMIN — SODIUM CHLORIDE: 9 INJECTION, SOLUTION INTRAVENOUS at 19:05

## 2019-08-12 RX ADMIN — FAMOTIDINE 20 MG: 10 INJECTION INTRAVENOUS at 12:27

## 2019-08-12 RX ADMIN — Medication 1 MG: at 15:55

## 2019-08-12 RX ADMIN — SODIUM CHLORIDE: 9 INJECTION, SOLUTION INTRAVENOUS at 22:21

## 2019-08-12 RX ADMIN — NEOSTIGMINE METHYLSULFATE 5 MG: 1 INJECTION, SOLUTION INTRAVENOUS at 15:55

## 2019-08-12 RX ADMIN — SODIUM CHLORIDE: 9 INJECTION, SOLUTION INTRAVENOUS at 21:12

## 2019-08-12 RX ADMIN — FENTANYL CITRATE 25 MCG: 50 INJECTION INTRAMUSCULAR; INTRAVENOUS at 15:05

## 2019-08-12 RX ADMIN — METOCLOPRAMIDE 10 MG: 5 INJECTION, SOLUTION INTRAMUSCULAR; INTRAVENOUS at 22:35

## 2019-08-12 RX ADMIN — HYDROMORPHONE HYDROCHLORIDE 0.25 MG: 1 INJECTION, SOLUTION INTRAMUSCULAR; INTRAVENOUS; SUBCUTANEOUS at 16:35

## 2019-08-12 RX ADMIN — MIDAZOLAM HYDROCHLORIDE 2 MG: 1 INJECTION, SOLUTION INTRAMUSCULAR; INTRAVENOUS at 14:53

## 2019-08-12 RX ADMIN — SUGAMMADEX 200 MG: 100 INJECTION, SOLUTION INTRAVENOUS at 16:12

## 2019-08-12 RX ADMIN — CEFOXITIN SODIUM 2 G: 2 POWDER, FOR SOLUTION INTRAVENOUS at 15:05

## 2019-08-12 RX ADMIN — ROCURONIUM BROMIDE 50 MG: 10 INJECTION INTRAVENOUS at 15:00

## 2019-08-12 RX ADMIN — LIDOCAINE HYDROCHLORIDE 40 MG: 20 INJECTION, SOLUTION INFILTRATION; PERINEURAL at 16:03

## 2019-08-12 RX ADMIN — PANTOPRAZOLE SODIUM 40 MG: 40 INJECTION, POWDER, FOR SOLUTION INTRAVENOUS at 22:35

## 2019-08-12 RX ADMIN — ROCURONIUM BROMIDE 10 MG: 10 INJECTION INTRAVENOUS at 15:20

## 2019-08-12 RX ADMIN — SODIUM CHLORIDE: 9 INJECTION, SOLUTION INTRAVENOUS at 14:53

## 2019-08-12 RX ADMIN — DEXAMETHASONE SODIUM PHOSPHATE 10 MG: 4 INJECTION, SOLUTION INTRAMUSCULAR; INTRAVENOUS at 15:00

## 2019-08-12 RX ADMIN — ONDANSETRON 4 MG: 2 INJECTION INTRAMUSCULAR; INTRAVENOUS at 12:24

## 2019-08-12 ASSESSMENT — PULMONARY FUNCTION TESTS
PIF_VALUE: 27
PIF_VALUE: 2
PIF_VALUE: 31
PIF_VALUE: 25
PIF_VALUE: 25
PIF_VALUE: 1
PIF_VALUE: 27
PIF_VALUE: 25
PIF_VALUE: 27
PIF_VALUE: 27
PIF_VALUE: 25
PIF_VALUE: 25
PIF_VALUE: 26
PIF_VALUE: 30
PIF_VALUE: 1
PIF_VALUE: 21
PIF_VALUE: 0
PIF_VALUE: 26
PIF_VALUE: 27
PIF_VALUE: 1
PIF_VALUE: 25
PIF_VALUE: 29
PIF_VALUE: 26
PIF_VALUE: 28
PIF_VALUE: 30
PIF_VALUE: 28
PIF_VALUE: 27
PIF_VALUE: 26
PIF_VALUE: 29
PIF_VALUE: 28
PIF_VALUE: 28
PIF_VALUE: 27
PIF_VALUE: 27
PIF_VALUE: 28
PIF_VALUE: 27
PIF_VALUE: 2
PIF_VALUE: 34
PIF_VALUE: 28
PIF_VALUE: 29
PIF_VALUE: 3
PIF_VALUE: 26
PIF_VALUE: 28
PIF_VALUE: 5
PIF_VALUE: 1
PIF_VALUE: 27
PIF_VALUE: 24
PIF_VALUE: 3
PIF_VALUE: 29
PIF_VALUE: 27
PIF_VALUE: 27
PIF_VALUE: 29
PIF_VALUE: 27
PIF_VALUE: 28
PIF_VALUE: 0
PIF_VALUE: 1
PIF_VALUE: 26
PIF_VALUE: 27
PIF_VALUE: 28
PIF_VALUE: 27
PIF_VALUE: 27
PIF_VALUE: 25
PIF_VALUE: 24
PIF_VALUE: 27
PIF_VALUE: 25
PIF_VALUE: 29
PIF_VALUE: 27
PIF_VALUE: 8
PIF_VALUE: 26
PIF_VALUE: 3
PIF_VALUE: 27
PIF_VALUE: 28
PIF_VALUE: 25
PIF_VALUE: 26
PIF_VALUE: 2
PIF_VALUE: 29
PIF_VALUE: 2
PIF_VALUE: 27

## 2019-08-12 ASSESSMENT — PAIN DESCRIPTION - PROGRESSION
CLINICAL_PROGRESSION: NOT CHANGED

## 2019-08-12 ASSESSMENT — PAIN DESCRIPTION - FREQUENCY
FREQUENCY: CONTINUOUS

## 2019-08-12 ASSESSMENT — PAIN DESCRIPTION - ORIENTATION
ORIENTATION: MID

## 2019-08-12 ASSESSMENT — PAIN SCALES - GENERAL
PAINLEVEL_OUTOF10: 5
PAINLEVEL_OUTOF10: 6
PAINLEVEL_OUTOF10: 6
PAINLEVEL_OUTOF10: 5
PAINLEVEL_OUTOF10: 5
PAINLEVEL_OUTOF10: 6
PAINLEVEL_OUTOF10: 3
PAINLEVEL_OUTOF10: 6

## 2019-08-12 ASSESSMENT — PAIN DESCRIPTION - PAIN TYPE
TYPE: SURGICAL PAIN

## 2019-08-12 ASSESSMENT — PAIN DESCRIPTION - ONSET
ONSET: ON-GOING

## 2019-08-12 ASSESSMENT — PAIN DESCRIPTION - DESCRIPTORS
DESCRIPTORS: DISCOMFORT
DESCRIPTORS: ACHING;DISCOMFORT

## 2019-08-12 ASSESSMENT — PAIN - FUNCTIONAL ASSESSMENT
PAIN_FUNCTIONAL_ASSESSMENT: PREVENTS OR INTERFERES SOME ACTIVE ACTIVITIES AND ADLS
PAIN_FUNCTIONAL_ASSESSMENT: 0-10
PAIN_FUNCTIONAL_ASSESSMENT: PREVENTS OR INTERFERES SOME ACTIVE ACTIVITIES AND ADLS
PAIN_FUNCTIONAL_ASSESSMENT: PREVENTS OR INTERFERES SOME ACTIVE ACTIVITIES AND ADLS

## 2019-08-12 ASSESSMENT — PAIN DESCRIPTION - LOCATION
LOCATION: ABDOMEN

## 2019-08-12 NOTE — BRIEF OP NOTE
Brief Postoperative Note  ______________________________________________________________    Patient: Jimbo Jonas  YOB: 1970  MRN: [id number]  Date of Procedure: 8/12/2019    Pre-Op Diagnosis: 1. MORBID OBESITY  2. BMI 53    Post-Op Diagnosis: Same       Procedure(s):  ROBOTIC SLEEVE GASTRECTOMY    Anesthesia: General/local    Surgeon(s):  Sanjiv Laws MD    Assistant: Hayden Coker    Estimated Blood Loss (mL): 10 ml    Complications: None    Specimens:   ID Type Source Tests Collected by Time Destination   A : GASTRIC REMNANT Tissue Stomach SURGICAL PATHOLOGY Sanjiv Laws MD 8/12/2019 1528        Implants:  Implant Name Type Inv.  Item Serial No.  Lot No. LRB No. Used   SEALANT EVICEL CROSSEAL W/ CATH 5ML Bone/Graft/Tissue/Human/Synth SEALANT EVICEL CROSSEAL W/ CATH 5ML  JNJ: [employer]TH[employer]  N/A 1         Drains: * No LDAs found *    Findings: as above    Sanjiv Laws MD  Date: 8/12/2019  Time: 4:15 PM

## 2019-08-12 NOTE — PROGRESS NOTES
1756  Patient meets PACU D/C criteria at this time. Patient is awake and alert on 4L NC and VSS. Report called to [address] [place] [address] RN.   Patient transported to [address] [place] [address] room 22 on 4L NC.

## 2019-08-12 NOTE — ANESTHESIA PRE PROCEDURE
Department of Anesthesiology  Preprocedure Note       Name:  Marilee Thomas   Age:  54 y.o.  :  1970                                          MRN:  [id number]         Date:  2019      Surgeon: Paris Litten):  Cristian Aguiar MD    Procedure: ROBOTIC SLEEVE GASTRECTOMY (N/A Abdomen)    Medications prior to admission:   Prior to Admission medications    Medication Sig Start Date End Date Taking? Authorizing Provider   Eluxadoline (VIBERZI) 75 MG TABS Take 0.5 tablets by mouth daily.    Yes Historical Provider, MD   Calcium Citrate-Vitamin D (CELEBRATE CALCIUM CITRATE PO) Take by mouth 2 times daily powder   Yes Historical Provider, MD   Multiple Vitamins-Minerals (MULTIVITAMIN ADULT PO) Take by mouth daily Liquid   Yes Historical Provider, MD   Iron Carbonyl-Vitamin C-FOS (CHEWABLE IRON PO) Take by mouth daily   Yes Historical Provider, MD   Cholecalciferol (VITAMIN D3) 2000 units TABS Take by mouth daily   Yes Historical Provider, MD   atorvastatin (LIPITOR) 40 MG tablet Take 40 mg by mouth nightly    Yes Historical Provider, MD   Docusate Sodium 100 MG TABS Take 100 mg by mouth 2 times daily Take as needed to prevent constipation  Patient not taking: Reported on 2019   Cristian Aguiar MD   enoxaparin (LOVENOX) 40 MG/0.4ML injection Inject 0.4 mLs into the skin daily for 14 days  Patient not taking: Reported on 2019  Cristian Aguiar MD   metoclopramide (REGLAN) 10 MG tablet Take 1 tablet by mouth every 6 hours as needed (nausea/vomiting)  Patient not taking: Reported on 2019  Cristian Aguiar MD   ondansetron (ZOFRAN) 4 MG tablet Take 1 tablet by mouth every 4 hours as needed for Nausea or Vomiting  Patient not taking: Reported on 2019  Cristian Aguiar MD   omeprazole (PRILOSEC) 40 MG delayed release capsule Take 1 capsule by mouth daily Open capsule and take in liquid  Patient not taking: Reported on 2019 BP:  (!) 139/91   Pulse:  63   Resp:  16   Temp:  97.5 °F (36.4 °C)   TempSrc:  Temporal   SpO2:  97%   Weight: 277 lb (125.6 kg) 271 lb (122.9 kg)   Height: 4' 11.5\" (1.511 m) 4' 11.5\" (1.511 m)                                              BP Readings from Last 3 Encounters:   08/12/19 (!) 139/91   08/09/19 116/80   06/21/19 134/86       NPO Status: Time of last liquid consumption: 2355                        Time of last solid consumption: 0930                        Date of last liquid consumption: 08/11/19                        Date of last solid food consumption: 08/11/19    BMI:   Wt Readings from Last 3 Encounters:   08/12/19 271 lb (122.9 kg)   08/09/19 267 lb 12.8 oz (121.5 kg)   06/21/19 266 lb (120.7 kg)     Body mass index is 53.82 kg/m². CBC:   Lab Results   Component Value Date    WBC 9.0 07/23/2019    RBC 4.27 07/23/2019    HGB 12.5 07/23/2019    HCT 37.5 07/23/2019    MCV 87.9 07/23/2019    RDW 14.5 07/23/2019     07/23/2019       CMP:   Lab Results   Component Value Date     07/23/2019    K 3.8 07/23/2019     07/23/2019    CO2 27 07/23/2019    BUN 19 07/23/2019    CREATININE 0.65 07/23/2019    LABGLOM >90 11/19/2018    GLUCOSE 128 07/23/2019    PROT 7.7 11/19/2018    CALCIUM 9.20 07/23/2019    BILITOT 0.5 11/19/2018    ALKPHOS 92 11/19/2018    AST 22 11/19/2018    ALT 31 11/19/2018       POC Tests: No results for input(s): POCGLU, POCNA, POCK, POCCL, POCBUN, POCHEMO, POCHCT in the last 72 hours.     Coags:   Lab Results   Component Value Date    INR 0.93 11/19/2018       HCG (If Applicable): No results found for: PREGTESTUR, PREGSERUM, HCG, HCGQUANT     ABGs: No results found for: PHART, PO2ART, BAI6HEZ, BHK8VOA, BEART, [id number]     Type & Screen (If Applicable):  No results found for: LABABO, [address]    Anesthesia Evaluation  Patient summary reviewed  Airway: Mallampati: III  TM distance: >3 FB   Neck ROM: full  Mouth opening: > = 3 FB Dental:          Pulmonary:

## 2019-08-12 NOTE — ANESTHESIA POSTPROCEDURE EVALUATION
Department of Anesthesiology  Postprocedure Note    Patient: Michelle Floyd  MRN: [id number]  YOB: 1970  Date of evaluation: 8/12/2019  Time:  5:02 PM     Procedure Summary     Date:  08/12/19 Room / Location:  [address] [place] [address] / Angela Laguerre    Anesthesia Start:  7576 Anesthesia Stop:  6646    Procedure:  ROBOTIC SLEEVE GASTRECTOMY (N/A Abdomen) Diagnosis:  (OBESITY)    Surgeon:  Agata Maldonado MD Responsible Provider:  Ronald Clayton DO    Anesthesia Type:  general ASA Status:  3          Anesthesia Type: general    Albert Phase I: Albert Score: 8    Albert Phase II:      Last vitals: Reviewed and per EMR flowsheets.        Anesthesia Post Evaluation    Patient location during evaluation: PACU  Patient participation: complete - patient participated  Level of consciousness: awake and alert  Airway patency: patent  Nausea & Vomiting: no nausea and no vomiting  Complications: no  Cardiovascular status: hemodynamically stable  Respiratory status: acceptable  Hydration status: stable

## 2019-08-12 NOTE — OP NOTE
completely mobilized, the bougie 36-Swiss was then placed under direct  vision and positioned with the help of anesthesia. This was then placed to suction. First firing was  a 60 robotic green cartridge. Care was taken to hug the bougie  but also not to stricture down the angle of the stomach. There were several firings of the 60-mm robotic blue  cartridges. All of these hugged the bougie. Last firing was a 60 mm robotic blue  cartridge. Here, care was taken to ensure taking all of  the excess fundus, but also not to stricture down or encroach on the GE  junction. Staple line was then tested under irrigation with air  insufflation through the bougie and clamped distally. No air bubbles or  any kind of concern for leak was identified. Irrigation was then removed. Bougie taken off of suction and removed under direct visualization. Evicel was placed over the staple line to ensure hemostasis and also give  this some added support. 0 Ethibond suture was then placed through the  gastric remnant and brought up to the 12-mm trocar site. Instruments were  removed. Robot undocked. I then scrubbed back into the case. Gastric  remnant was removed and sent to Pathology for permanent. Using a Storz  suture passer and 0 Vicryl suture, this was placed through the fascia there  around the 12-mm trocar site. This was tied and at the completion of this,  there were no fascial defects. Liver retractor removed. Other trocars  were then removed under direct vision. Hemostasis was adequate. The  patient tolerated desufflation well. Vicryl suture 4-0 was used in a  subcuticular fashion in all the open incisions for skin closure. Closed  incisions were then cleaned, dried, and Steri-Strips applied. Marcaine  0.5% with epinephrine was used for local anesthetic. The patient tolerated  the injection of local anesthetic without difficulty.   Sponge, needle and  instrumentation counts were correct at the end of the

## 2019-08-13 ENCOUNTER — APPOINTMENT (OUTPATIENT)
Dept: GENERAL RADIOLOGY | Age: 49
DRG: 403 | End: 2019-08-13
Attending: SURGERY
Payer: MEDICAID

## 2019-08-13 LAB
ANION GAP SERPL CALCULATED.3IONS-SCNC: 13 MEQ/L (ref 8–16)
BUN BLDV-MCNC: 7 MG/DL (ref 7–22)
CALCIUM SERPL-MCNC: 8.9 MG/DL (ref 8.5–10.5)
CHLORIDE BLD-SCNC: 106 MEQ/L (ref 98–111)
CO2: 21 MEQ/L (ref 23–33)
CREAT SERPL-MCNC: 0.5 MG/DL (ref 0.4–1.2)
EKG ATRIAL RATE: 86 BPM
EKG P-R INTERVAL: 142 MS
EKG Q-T INTERVAL: 388 MS
EKG QRS DURATION: 90 MS
EKG QTC CALCULATION (BAZETT): 464 MS
EKG R AXIS: 51 DEGREES
EKG T AXIS: 44 DEGREES
EKG VENTRICULAR RATE: 86 BPM
GFR SERPL CREATININE-BSD FRML MDRD: > 90 ML/MIN/1.73M2
GLUCOSE BLD-MCNC: 145 MG/DL (ref 70–108)
HCT VFR BLD CALC: 38.3 % (ref 37–47)
HEMOGLOBIN: 12.2 GM/DL (ref 12–16)
POTASSIUM REFLEX MAGNESIUM: 4 MEQ/L (ref 3.5–5.2)
SODIUM BLD-SCNC: 140 MEQ/L (ref 135–145)

## 2019-08-13 PROCEDURE — 6370000000 HC RX 637 (ALT 250 FOR IP): Performed by: NURSE PRACTITIONER

## 2019-08-13 PROCEDURE — A4641 RADIOPHARM DX AGENT NOC: HCPCS | Performed by: SURGERY

## 2019-08-13 PROCEDURE — 94640 AIRWAY INHALATION TREATMENT: CPT

## 2019-08-13 PROCEDURE — 93010 ELECTROCARDIOGRAM REPORT: CPT | Performed by: INTERNAL MEDICINE

## 2019-08-13 PROCEDURE — 99024 POSTOP FOLLOW-UP VISIT: CPT | Performed by: NURSE PRACTITIONER

## 2019-08-13 PROCEDURE — C9113 INJ PANTOPRAZOLE SODIUM, VIA: HCPCS | Performed by: SURGERY

## 2019-08-13 PROCEDURE — 36415 COLL VENOUS BLD VENIPUNCTURE: CPT

## 2019-08-13 PROCEDURE — 6370000000 HC RX 637 (ALT 250 FOR IP): Performed by: SURGERY

## 2019-08-13 PROCEDURE — 6360000002 HC RX W HCPCS: Performed by: NURSE PRACTITIONER

## 2019-08-13 PROCEDURE — 93005 ELECTROCARDIOGRAM TRACING: CPT | Performed by: SURGERY

## 2019-08-13 PROCEDURE — 2580000003 HC RX 258: Performed by: SURGERY

## 2019-08-13 PROCEDURE — 1200000000 HC SEMI PRIVATE

## 2019-08-13 PROCEDURE — 85018 HEMOGLOBIN: CPT

## 2019-08-13 PROCEDURE — 6360000004 HC RX CONTRAST MEDICATION: Performed by: SURGERY

## 2019-08-13 PROCEDURE — 80048 BASIC METABOLIC PNL TOTAL CA: CPT

## 2019-08-13 PROCEDURE — 94761 N-INVAS EAR/PLS OXIMETRY MLT: CPT

## 2019-08-13 PROCEDURE — 6360000002 HC RX W HCPCS: Performed by: SURGERY

## 2019-08-13 PROCEDURE — 85014 HEMATOCRIT: CPT

## 2019-08-13 PROCEDURE — 74240 X-RAY XM UPR GI TRC 1CNTRST: CPT

## 2019-08-13 RX ORDER — ONDANSETRON 4 MG/1
4 TABLET, ORALLY DISINTEGRATING ORAL EVERY 8 HOURS PRN
Qty: 30 TABLET | Refills: 0
Start: 2019-08-13 | End: 2019-08-27

## 2019-08-13 RX ORDER — OXYCODONE HYDROCHLORIDE 5 MG/1
5 TABLET ORAL EVERY 4 HOURS PRN
Status: DISCONTINUED | OUTPATIENT
Start: 2019-08-13 | End: 2019-08-14 | Stop reason: HOSPADM

## 2019-08-13 RX ORDER — IPRATROPIUM BROMIDE AND ALBUTEROL SULFATE 2.5; .5 MG/3ML; MG/3ML
1 SOLUTION RESPIRATORY (INHALATION)
Status: DISCONTINUED | OUTPATIENT
Start: 2019-08-13 | End: 2019-08-14 | Stop reason: HOSPADM

## 2019-08-13 RX ORDER — DOCUSATE SODIUM 100 MG/1
100 CAPSULE, LIQUID FILLED ORAL 2 TIMES DAILY
Status: DISCONTINUED | OUTPATIENT
Start: 2019-08-13 | End: 2019-08-14 | Stop reason: HOSPADM

## 2019-08-13 RX ORDER — HYDRALAZINE HYDROCHLORIDE 20 MG/ML
10 INJECTION INTRAMUSCULAR; INTRAVENOUS EVERY 6 HOURS PRN
Status: DISCONTINUED | OUTPATIENT
Start: 2019-08-13 | End: 2019-08-14 | Stop reason: HOSPADM

## 2019-08-13 RX ADMIN — DOCUSATE SODIUM 100 MG: 100 CAPSULE, LIQUID FILLED ORAL at 21:24

## 2019-08-13 RX ADMIN — ENOXAPARIN SODIUM 40 MG: 40 INJECTION SUBCUTANEOUS at 09:06

## 2019-08-13 RX ADMIN — SODIUM CHLORIDE: 9 INJECTION, SOLUTION INTRAVENOUS at 23:11

## 2019-08-13 RX ADMIN — HYOSCYAMINE SULFATE 125 MCG: 0.12 TABLET, ORALLY DISINTEGRATING ORAL at 00:42

## 2019-08-13 RX ADMIN — DOCUSATE SODIUM 100 MG: 100 CAPSULE, LIQUID FILLED ORAL at 09:06

## 2019-08-13 RX ADMIN — HYOSCYAMINE SULFATE 125 MCG: 0.12 TABLET, ORALLY DISINTEGRATING ORAL at 19:08

## 2019-08-13 RX ADMIN — Medication 10 ML: at 21:29

## 2019-08-13 RX ADMIN — OXYCODONE HYDROCHLORIDE 5 MG: 5 TABLET ORAL at 23:35

## 2019-08-13 RX ADMIN — HYDRALAZINE HYDROCHLORIDE 10 MG: 20 INJECTION INTRAMUSCULAR; INTRAVENOUS at 16:28

## 2019-08-13 RX ADMIN — SODIUM CHLORIDE: 9 INJECTION, SOLUTION INTRAVENOUS at 15:29

## 2019-08-13 RX ADMIN — ONDANSETRON 4 MG: 2 INJECTION INTRAMUSCULAR; INTRAVENOUS at 07:50

## 2019-08-13 RX ADMIN — Medication 10 ML: at 07:51

## 2019-08-13 RX ADMIN — PANTOPRAZOLE SODIUM 40 MG: 40 INJECTION, POWDER, FOR SOLUTION INTRAVENOUS at 07:50

## 2019-08-13 RX ADMIN — HYOSCYAMINE SULFATE 125 MCG: 0.12 TABLET, ORALLY DISINTEGRATING ORAL at 09:06

## 2019-08-13 RX ADMIN — METOCLOPRAMIDE 10 MG: 5 INJECTION, SOLUTION INTRAMUSCULAR; INTRAVENOUS at 04:40

## 2019-08-13 RX ADMIN — HYOSCYAMINE SULFATE 125 MCG: 0.12 TABLET, ORALLY DISINTEGRATING ORAL at 04:40

## 2019-08-13 RX ADMIN — HYOSCYAMINE SULFATE 125 MCG: 0.12 TABLET, ORALLY DISINTEGRATING ORAL at 15:28

## 2019-08-13 RX ADMIN — ONDANSETRON 4 MG: 2 INJECTION INTRAMUSCULAR; INTRAVENOUS at 02:26

## 2019-08-13 RX ADMIN — ENOXAPARIN SODIUM 40 MG: 40 INJECTION SUBCUTANEOUS at 21:24

## 2019-08-13 RX ADMIN — ONDANSETRON 4 MG: 2 INJECTION INTRAMUSCULAR; INTRAVENOUS at 21:24

## 2019-08-13 RX ADMIN — HYOSCYAMINE SULFATE 125 MCG: 0.12 TABLET, ORALLY DISINTEGRATING ORAL at 23:35

## 2019-08-13 RX ADMIN — BARIUM SULFATE 20 ML: 0.6 SUSPENSION ORAL at 08:15

## 2019-08-13 RX ADMIN — METOCLOPRAMIDE 10 MG: 5 INJECTION, SOLUTION INTRAMUSCULAR; INTRAVENOUS at 12:11

## 2019-08-13 RX ADMIN — IOHEXOL 50 ML: 240 INJECTION, SOLUTION INTRATHECAL; INTRAVASCULAR; INTRAVENOUS; ORAL at 08:15

## 2019-08-13 RX ADMIN — IPRATROPIUM BROMIDE AND ALBUTEROL SULFATE 1 AMPULE: .5; 3 SOLUTION RESPIRATORY (INHALATION) at 18:16

## 2019-08-13 RX ADMIN — IPRATROPIUM BROMIDE AND ALBUTEROL SULFATE 1 AMPULE: .5; 3 SOLUTION RESPIRATORY (INHALATION) at 22:36

## 2019-08-13 ASSESSMENT — PAIN DESCRIPTION - PAIN TYPE
TYPE: SURGICAL PAIN

## 2019-08-13 ASSESSMENT — PAIN DESCRIPTION - FREQUENCY
FREQUENCY: CONTINUOUS
FREQUENCY: INTERMITTENT
FREQUENCY: INTERMITTENT

## 2019-08-13 ASSESSMENT — PAIN DESCRIPTION - ONSET
ONSET: ON-GOING

## 2019-08-13 ASSESSMENT — PAIN DESCRIPTION - PROGRESSION
CLINICAL_PROGRESSION: GRADUALLY IMPROVING
CLINICAL_PROGRESSION: GRADUALLY IMPROVING
CLINICAL_PROGRESSION: NOT CHANGED
CLINICAL_PROGRESSION: GRADUALLY IMPROVING
CLINICAL_PROGRESSION: GRADUALLY WORSENING
CLINICAL_PROGRESSION: GRADUALLY IMPROVING
CLINICAL_PROGRESSION: NOT CHANGED
CLINICAL_PROGRESSION: GRADUALLY IMPROVING
CLINICAL_PROGRESSION: NOT CHANGED
CLINICAL_PROGRESSION: GRADUALLY IMPROVING

## 2019-08-13 ASSESSMENT — PAIN DESCRIPTION - ORIENTATION
ORIENTATION: MID

## 2019-08-13 ASSESSMENT — PAIN DESCRIPTION - DESCRIPTORS
DESCRIPTORS: ACHING;DISCOMFORT
DESCRIPTORS: ACHING;PRESSURE
DESCRIPTORS: ACHING;DISCOMFORT
DESCRIPTORS: DISCOMFORT

## 2019-08-13 ASSESSMENT — PAIN SCALES - GENERAL
PAINLEVEL_OUTOF10: 1
PAINLEVEL_OUTOF10: 0
PAINLEVEL_OUTOF10: 6
PAINLEVEL_OUTOF10: 4
PAINLEVEL_OUTOF10: 4
PAINLEVEL_OUTOF10: 3
PAINLEVEL_OUTOF10: 4
PAINLEVEL_OUTOF10: 2
PAINLEVEL_OUTOF10: 2
PAINLEVEL_OUTOF10: 3

## 2019-08-13 ASSESSMENT — PAIN - FUNCTIONAL ASSESSMENT
PAIN_FUNCTIONAL_ASSESSMENT: PREVENTS OR INTERFERES WITH MANY ACTIVE NOT PASSIVE ACTIVITIES
PAIN_FUNCTIONAL_ASSESSMENT: PREVENTS OR INTERFERES SOME ACTIVE ACTIVITIES AND ADLS
PAIN_FUNCTIONAL_ASSESSMENT: ACTIVITIES ARE NOT PREVENTED

## 2019-08-13 ASSESSMENT — PAIN DESCRIPTION - LOCATION
LOCATION: ABDOMEN

## 2019-08-13 NOTE — PLAN OF CARE
Problem: Falls - Risk of:  Goal: Will remain free from falls  Description  Will remain free from falls  Outcome: Ongoing  Note:   No falls this shift. Pt using call light appropriately to call for assistance with ambulation to the bathroom and to chair. Pt is also compliant with use of non-skid slippers. Pt reports understanding of fall prevention when discussed. Goal: Absence of physical injury  Description  Absence of physical injury  Outcome: Ongoing  Note:   No physical injury this shift. Will continue to monitor. Problem: Pain Control  Goal: Maintain pain level at or below patient's acceptable level (or 5 if patient is unable to determine acceptable level)  Outcome: Ongoing  Flowsheets (Taken 8/13/2019 2645)  Patient's Stated Pain Goal: 3  Note:   Pt report pain at 2-5 on scale. Pt states oral medication helping to achieve pain goal of a 3 on scale. Problem: Neurological  Goal: Maximum potential motor/sensory/cognitive function  Outcome: Ongoing  Note:   Pt denies numbing and tingling to all extremities. Alert and oriented x4     Problem: Cardiovascular  Goal: No DVT, peripheral vascular complications  Outcome: Ongoing  Note:   Pt without s/s of DVT. Pt able to take prescribed anticoagulants and SCD,S in place to help prevent development of DVT. Problem: Respiratory  Goal: No pulmonary complications  Outcome: Ongoing  Note:   Pt sats 92-95 on 2 LO2 per nasal cannula. No shortness of breath noted. Lungs diminished, uses IS, and able to C&DB  as ordered. Problem: GI  Goal: No bowel complications  Outcome: Ongoing  Note:   Pt with hypoactive bowel sounds, not passing flatus, and with nausea. Problem:   Goal: Adequate urinary output  Outcome: Ongoing  Note:   Pt voiding adequate amounts without difficulty. Problem: Nutrition  Goal: Optimal nutrition therapy  Outcome: Ongoing  Note:   Pt tolerating ice chips well.       Problem: Skin Integrity/Risk  Goal: No skin breakdown during

## 2019-08-14 ENCOUNTER — APPOINTMENT (OUTPATIENT)
Dept: PHYSICAL THERAPY | Age: 49
End: 2019-08-14
Payer: MEDICAID

## 2019-08-14 VITALS
SYSTOLIC BLOOD PRESSURE: 135 MMHG | RESPIRATION RATE: 16 BRPM | HEART RATE: 65 BPM | BODY MASS INDEX: 53.2 KG/M2 | DIASTOLIC BLOOD PRESSURE: 75 MMHG | WEIGHT: 271 LBS | OXYGEN SATURATION: 100 % | TEMPERATURE: 98.5 F | HEIGHT: 60 IN

## 2019-08-14 PROCEDURE — 6370000000 HC RX 637 (ALT 250 FOR IP): Performed by: NURSE PRACTITIONER

## 2019-08-14 PROCEDURE — C9113 INJ PANTOPRAZOLE SODIUM, VIA: HCPCS | Performed by: SURGERY

## 2019-08-14 PROCEDURE — 6360000002 HC RX W HCPCS: Performed by: SURGERY

## 2019-08-14 PROCEDURE — 99024 POSTOP FOLLOW-UP VISIT: CPT | Performed by: NURSE PRACTITIONER

## 2019-08-14 PROCEDURE — 94640 AIRWAY INHALATION TREATMENT: CPT

## 2019-08-14 PROCEDURE — 2580000003 HC RX 258: Performed by: SURGERY

## 2019-08-14 RX ORDER — SIMETHICONE 80 MG
80 TABLET,CHEWABLE ORAL 4 TIMES DAILY PRN
Status: DISCONTINUED | OUTPATIENT
Start: 2019-08-14 | End: 2019-08-14 | Stop reason: HOSPADM

## 2019-08-14 RX ORDER — OXYCODONE HYDROCHLORIDE 5 MG/1
5 TABLET ORAL EVERY 6 HOURS PRN
Qty: 28 TABLET | Refills: 0 | Status: SHIPPED | OUTPATIENT
Start: 2019-08-14 | End: 2019-08-21

## 2019-08-14 RX ORDER — HYOSCYAMINE SULFATE 0.125 MG
125 TABLET,DISINTEGRATING ORAL EVERY 4 HOURS
Qty: 30 TABLET | Refills: 0 | Status: SHIPPED | OUTPATIENT
Start: 2019-08-14 | End: 2019-09-25

## 2019-08-14 RX ORDER — HYOSCYAMINE SULFATE 0.125 MG
125 TABLET,DISINTEGRATING ORAL EVERY 4 HOURS
Status: DISCONTINUED | OUTPATIENT
Start: 2019-08-14 | End: 2019-08-14 | Stop reason: HOSPADM

## 2019-08-14 RX ADMIN — IPRATROPIUM BROMIDE AND ALBUTEROL SULFATE 1 AMPULE: .5; 3 SOLUTION RESPIRATORY (INHALATION) at 11:00

## 2019-08-14 RX ADMIN — ENOXAPARIN SODIUM 40 MG: 40 INJECTION SUBCUTANEOUS at 09:45

## 2019-08-14 RX ADMIN — Medication 10 ML: at 09:46

## 2019-08-14 RX ADMIN — ONDANSETRON 4 MG: 2 INJECTION INTRAMUSCULAR; INTRAVENOUS at 14:08

## 2019-08-14 RX ADMIN — HYOSCYAMINE SULFATE 125 MCG: 0.12 TABLET, ORALLY DISINTEGRATING ORAL at 14:08

## 2019-08-14 RX ADMIN — ONDANSETRON 4 MG: 2 INJECTION INTRAMUSCULAR; INTRAVENOUS at 09:46

## 2019-08-14 RX ADMIN — SIMETHICONE 80 MG: 80 TABLET, CHEWABLE ORAL at 09:46

## 2019-08-14 RX ADMIN — DOCUSATE SODIUM 100 MG: 100 CAPSULE, LIQUID FILLED ORAL at 09:46

## 2019-08-14 RX ADMIN — HYOSCYAMINE SULFATE 125 MCG: 0.12 TABLET, ORALLY DISINTEGRATING ORAL at 09:51

## 2019-08-14 RX ADMIN — ONDANSETRON 4 MG: 2 INJECTION INTRAMUSCULAR; INTRAVENOUS at 02:13

## 2019-08-14 RX ADMIN — PANTOPRAZOLE SODIUM 40 MG: 40 INJECTION, POWDER, FOR SOLUTION INTRAVENOUS at 09:46

## 2019-08-14 ASSESSMENT — PAIN DESCRIPTION - PAIN TYPE: TYPE: SURGICAL PAIN

## 2019-08-14 ASSESSMENT — PAIN - FUNCTIONAL ASSESSMENT: PAIN_FUNCTIONAL_ASSESSMENT: PREVENTS OR INTERFERES SOME ACTIVE ACTIVITIES AND ADLS

## 2019-08-14 ASSESSMENT — PAIN DESCRIPTION - ONSET: ONSET: ON-GOING

## 2019-08-14 ASSESSMENT — PAIN DESCRIPTION - PROGRESSION
CLINICAL_PROGRESSION: GRADUALLY IMPROVING

## 2019-08-14 ASSESSMENT — PAIN SCALES - GENERAL
PAINLEVEL_OUTOF10: 2
PAINLEVEL_OUTOF10: 2

## 2019-08-14 ASSESSMENT — PAIN DESCRIPTION - FREQUENCY: FREQUENCY: INTERMITTENT

## 2019-08-14 ASSESSMENT — PAIN DESCRIPTION - LOCATION: LOCATION: ABDOMEN

## 2019-08-14 ASSESSMENT — PAIN DESCRIPTION - DESCRIPTORS: DESCRIPTORS: ACHING;DISCOMFORT

## 2019-08-14 ASSESSMENT — PAIN DESCRIPTION - ORIENTATION: ORIENTATION: MID

## 2019-08-14 NOTE — PROGRESS NOTES
[facility] - Dr. Viviana Ruggiero  Postoperative Bariatric Progress Note    Pt Name: Norm Lyon  Medical Record Number: [id number]  Date of Birth 1970   Today's Date: 8/14/2019    ASSESSMENT   1. POD # 2 S/p robotic assisted sleeve gastrectomy  2. Morbid obesity (BMI 53)  3. Hypertension  4. Acute pulmonary insufficiency postoperatively   5. Right foot plantar fascitis and achilles tendon injury   has a past medical history of Arthritis and Hyperlipidemia. PLAN   1. Upper GI without evidence of leak or obstruction  2. Tolerating sugar free clear liquids. Strict I&Os. 3. IV hydration  4. DVT & GI prophylaxis  5. Routine incisional care  6. Continue pulmonary toileting with IS, C&DB, duonebs. 7. Pain & nausea control  8. Scheduled Oscimin. Mylicon PRN. 9. Stool softeners as needed  10. Ambulation every 1-2 hours. Up with boot and walker as needed. Continue therapy at discharge. 11. Clinically, looks good. Afebrile. Blood pressure better. Needs a lot of motivation and encouragement to drink. Having epigastric spasms with drinking. Schedule Oscimin and encouraged to drink. Hopefully home later this afternoon/evening. SUBJECTIVE   Patient was stable overnight. Chart reviewed. VS noted. Updated by nursing staff. A lot of epigastric cramping with oral intake. No nausea or vomiting. (+) belching. Passing flatus. No BM. Tolerating clear liquids so far. Taking Oxy-IR as needed. Up with assistance. Urinating without issues. Abdominal incisions are healing well.    CURRENT MEDICATIONS   Scheduled Meds:   hyoscyamine  125 mcg Sublingual Q4H    docusate sodium  100 mg Oral BID    ipratropium-albuterol  1 ampule Inhalation Q4H WA    sodium chloride flush  10 mL Intravenous 2 times per day    ondansetron  4 mg Intravenous Q6H    scopolamine  1 patch Transdermal Q72H    pantoprazole  40 mg Intravenous Daily    And    sodium chloride (PF)  10 mL Intravenous Daily    enoxaparin  40 mg Subcutaneous 2 times per day     Continuous Infusions:   sodium chloride 125 mL/hr at 19 2311     PRN Meds:.simethicone, oxyCODONE, hydrALAZINE, sodium chloride flush, morphine **OR** morphine, promethazine, promethazine, metoclopramide  OBJECTIVE   CURRENT VITALS:  height is 4' 11.5\" (1.511 m) and weight is 271 lb (122.9 kg). Her oral temperature is 98.5 °F (36.9 °C). Her blood pressure is 135/75 and her pulse is 65. Her respiration is 16 and oxygen saturation is 100%. Temperature Range (24h):Temp: 98.5 °F (36.9 °C) Temp  Av.8 °F (37.1 °C)  Min: 98.3 °F (36.8 °C)  Max: 99.3 °F (37.4 °C)  BP Range (05M): Systolic (30SEV), PEX:764 , Min:128 , MTK:303     Diastolic (52FHR), JXV:25, Min:60, Max:98    Pulse Range (24h): Pulse  Av.8  Min: 65  Max: 80  Respiration Range (24h): Resp  Av.3  Min: 16  Max: 18  Current Pulse Ox (24h):  SpO2: 100 %  Pulse Ox Range (24h):  SpO2  Av.2 %  Min: 91 %  Max: 100 %  Oxygen Amount and Delivery: O2 Flow Rate (L/min): 2 L/min  Incentive Spirometry Tx:            GENERAL: alert, no distress  LUNGS: clear to ausculation, without wheezes, rales or rhonci  HEART: normal rate and regular rhythm  ABDOMEN: non-distended, soft, incisional tenderness, bowel sounds present   INCISION: healing well, no significant drainage, no significant erythema  EXTERMITY: no cyanosis, clubbing or edema. Right foot/ankle pain with ambulating. In: 2728 [P.O.:1025;  I.V.:2468]  Out: -   Date 19 0000 - 19 2359   Shift 9652-8465 2135-0367 1066-9249 24 Hour Total   INTAKE   P.O. 195 360  555   I.V.(mL/kg/hr) 873(0.9) 730  1603   Shift Total(mL/kg) 9374(8.2) 1090(8.9)  2158(17.6)   OUTPUT   Shift Total(mL/kg)       Weight (kg) 122.9 122.9 122.9 122.9     LABS     Recent Labs     19  0620   HGB 12.2   HCT 38.3      K 4.0      CO2 21*   BUN 7   CREATININE 0.5   CALCIUM 8.9      RADIOLOGY     PROCEDURE: FL UGI       CLINICAL INFORMATION:

## 2019-08-15 NOTE — DISCHARGE SUMMARY
Pulse: 80   Resp: 18   Temp: 98.8 °F (37.1 °C)      Objective:   Physical Exam   Constitutional: She is oriented to person, place, and time. She appears well-developed and well-nourished. No distress. HENT:   Head: Normocephalic and atraumatic. Right Ear: External ear normal.   Left Ear: External ear normal.   Nose: Nose normal.   Mouth/Throat: Oropharynx is clear and moist.   Eyes: Conjunctivae and EOM are normal. Right eye exhibits no discharge. Left eye exhibits no discharge. No scleral icterus. Neck: Normal range of motion. Neck supple. Cardiovascular: Normal rate, regular rhythm, normal heart sounds and intact distal pulses. Pulmonary/Chest: Effort normal and breath sounds normal. No respiratory distress. She has no wheezes. She has no rales. She exhibits no tenderness. Abdominal: Soft. Bowel sounds are normal. She exhibits no distension and no mass. There is no tenderness. There is no rebound and no guarding. Musculoskeletal: Normal range of motion. She exhibits no edema or tenderness. Neurological: She is alert and oriented to person, place, and time. No cranial nerve deficit. Skin: Skin is warm and dry. No rash noted. She is not diaphoretic. No erythema. No pallor. Psychiatric: She has a normal mood and affect.  Her behavior is normal. Judgment and thought content normal.   Vitals reviewed.               Lab Results   Component Value Date      07/23/2019     K 3.8 07/23/2019      07/23/2019     CO2 27 07/23/2019                Lab Results   Component Value Date     CREATININE 0.65 07/23/2019                Lab Results   Component Value Date     WBC 9.0 07/23/2019     HGB 12.5 07/23/2019     HCT 37.5 07/23/2019     MCV 87.9 07/23/2019      07/23/2019                Lab Results   Component Value Date     ALT 31 11/19/2018     AST 22 11/19/2018     ALKPHOS 92 11/19/2018     BILITOT 0.5 11/19/2018      Imaging -   Narrative   PROCEDURE: XR CHEST (2 VW)       CLINICAL habits. 3.  Nutritional education occurred during visit.  Follow up with dietitian for further evaluation -continue to follow their recommendations as directed.   4.  Options for medical management of morbid obesity discussed. 5.  Improvement in fitness/exercise discussed with patient and the need for this with/without surgery. 6.  Medical necessity letter from PCP. 7.  Follow-up in one week after surgery in the weight management program at [address] [place] [address]. 8.  Signs and symptoms reviewed with patient that would be concerning and need her to return to office for re-evaluation. Patient states she will call if she has questions or concerns.   9. Multivitamin  10.  Psychology evaluation completed.  Follow-up as needed. 11.  EGD pleaded.  Results reviewed.  All questions answered. 12.  Encouraged support groups  13.  Schedule Claudia for robotic sleeve gastrectomy. 14. She has undergone pre-operative clearance per anesthesia guidelines with risk factors listed under the past medical history diagnosis & problem list.  15. The risks, benefits and alternatives were discussed with Claudia including non-operative management.  The pros and cons of robotic, laparoscopic and open techniques were discussed.  All questions answered. She understands and wishes to proceed with surgical intervention. 16. Restrictions discussed with Claudia and she expresses understanding.   17. She is advised to call back directly if there are further questions/concerns, or if her symptoms worsen prior to surgery.           Patient states that she has not been able to lose enough adequate excess body weight with medical management only and would like to proceed with a robotic sleeve gastrectomy for further weight loss.     Procedures/Diagnostic Tests:      Operative Report        Pt Name: Rachel De León  MRN: [id number]  YOB: 1970  Surgeon: Carmelita Man MD [facility]  Primary Care Physician: ANJEL Ferreira - condition.  Chas Borden M.D. FACS  Electronically signed 8/12/2019 at 4:18 PM    PROCEDURE: FL UGI       CLINICAL INFORMATION: Obesity, status post sleeve gastrectomy.       TECHNIQUE: A preliminary abdominal radiograph was obtained. Following the oral administration of dilute Gastrografin and thin barium, the anatomy of the distal esophagus, stomach and duodenum were evaluated in a limited upper GI examination. 10 images    were obtained. Total fluoroscopy time 1.0 minutes.       COMPARISON: None       FINDINGS: Preliminary abdominal radiograph demonstrates a nonobstructive bowel gas pattern. Surgical suture material is present in the left upper quadrant of the abdomen. Osseous structures are unremarkable.       There is no stenosis or large hiatal hernia in the distal esophagus. There are surgical changes in the stomach of prior sleeve gastrectomy. There is normal transit of contrast through the stomach into the duodenum. There is no extravasation of contrast    to suggest a leak. The duodenal sweep is normal.           Impression       No evidence of obstruction or leak following sleeve gastrectomy.       Final report electronically signed by Dr. Bill Webster on 8/13/2019 8:30 AM     Hospital Course: Meghana Ceja is a 72-year-old female who presented to the weight management program for his (morbid) obesity, BMI 53. She decided to electively undergo robotic assisted sleeve gastrectomy with Dr. Jazmín Sen on 8/12/19. She was admitted to [facility] for postoperative care and was initially managed with bowel rest, analgesics for pain control, IV fluid hydration, GI and DVT prophylaxis. On 8/13/19 she passed her upper GI study and patient was advanced to sugar free clear liquids. She slowly improved in ability to tolerate increasing levels of activity and able to take clear liquids without nausea. Patient was able to void on her own accord. She was discharged home on liquid diet until follow up appointment. Discharge Condition: stable    Disposition: Home    Labs:  Lab Results   Component Value Date    WBC 9.0 07/23/2019    HGB 12.2 08/13/2019    HCT 38.3 08/13/2019    MCV 87.9 07/23/2019     07/23/2019     Lab Results   Component Value Date     08/13/2019    K 4.0 08/13/2019     08/13/2019    CO2 21 08/13/2019    BUN 7 08/13/2019    CREATININE 0.5 08/13/2019    GLUCOSE 145 08/13/2019    GLUCOSE 128 07/23/2019    CALCIUM 8.9 08/13/2019        Wt Readings from Last 3 Encounters:   08/12/19 271 lb (122.9 kg)   08/09/19 267 lb 12.8 oz (121.5 kg)   06/21/19 266 lb (120.7 kg)       Discharge Instructions:  Pt Name: [facility]  Record Number: [id number]  Today's Date: 8/13/2019    GENERAL ANESTHESIA OR SEDATION  1. Do not drive or operate hazardous machinery for 24 hours. 2. Do not make important business or personal decisions for 24 hours. 3. Do not drink alcoholic beverages or use tobacco for 24 hours. ACTIVITY INSTRUCTIONS:  [] Rest today. Resume light to normal activity tomorrow.   [] You may resume normal activity tomorrow. Do not engage in strenuous activity that may place stress on your incision. [x] Do not drive for 3-5 days or while taking pain medication. Avoid heavy lifting, tugging, pullings greater than 10 lbs until seen in the office. DIET INSTRUCTIONS:  [x]Other: Sugar-free post bariatric surgery diet as instructed per dietician. Continue protein shakes as prior to surgery. Two shakes a day with milk or three shakes a day with water. MEDICATIONS  [x]Prescription sent with you to be used as directed. []Percocet   []Vicodin   [x]oxycodone    []Tylenol #3   []Oxycontin  Do not drink alcohol or drive while taking these medications. You may experience dizziness or drowsiness with these medications. You may also experience constipation which can be relieved with stool softners or laxatives.   [x]You may resume your daily prescription medication schedule unless into the skin daily for 14 days     metoclopramide 10 MG tablet  Commonly known as:  REGLAN  Take 1 tablet by mouth every 6 hours as needed (nausea/vomiting)     MULTIVITAMIN ADULT PO     omeprazole 40 MG delayed release capsule  Commonly known as:  PRILOSEC  Take 1 capsule by mouth daily Open capsule and take in liquid     VIBERZI 75 MG Tabs  Generic drug:  Eluxadoline     Vitamin D3 2000 units Tabs        STOP taking these medications    ondansetron 4 MG tablet  Commonly known as:  Cody Tai           Where to Get Your Medications      These medications were sent to [facility] [phone] [address], [facility] [address]    Phone:  [phone]   · hyoscyamine 125 MCG Tbdp dispersible tablet  · oxyCODONE 5 MG immediate release tablet     Information about where to get these medications is not yet available    Ask your nurse or doctor about these medications  · ondansetron 4 MG disintegrating tablet      Pharmacy Instructions:      MAKE SURE TO SPACE PILLS OUT AND TAKE 1 AT A TIME EVERY COUPLE MINUTES.                    Follow-up:  in 10 days with ANJEL TONG - CNP  Follow up: in 1 week in weight management with Dr. Leena Granger MD/Camilla Kaiser, [address], CNP   Electronincally signed 8/15/2019 at 9:43 AM

## 2019-08-16 ENCOUNTER — HOSPITAL ENCOUNTER (OUTPATIENT)
Dept: PHYSICAL THERAPY | Age: 49
Setting detail: THERAPIES SERIES
Discharge: HOME | End: 2019-08-16
Payer: MEDICAID

## 2019-08-16 PROCEDURE — 97110 THERAPEUTIC EXERCISES: CPT

## 2019-08-16 PROCEDURE — 97140 MANUAL THERAPY 1/> REGIONS: CPT

## 2019-08-19 ENCOUNTER — OFFICE VISIT (OUTPATIENT)
Dept: BARIATRICS/WEIGHT MGMT | Age: 49
End: 2019-08-19

## 2019-08-19 ENCOUNTER — TELEPHONE (OUTPATIENT)
Dept: BARIATRICS/WEIGHT MGMT | Age: 49
End: 2019-08-19

## 2019-08-19 ENCOUNTER — HOSPITAL ENCOUNTER (OUTPATIENT)
Dept: PHYSICAL THERAPY | Age: 49
Setting detail: THERAPIES SERIES
Discharge: HOME | End: 2019-08-19
Payer: MEDICAID

## 2019-08-19 VITALS
WEIGHT: 260 LBS | HEIGHT: 60 IN | SYSTOLIC BLOOD PRESSURE: 104 MMHG | TEMPERATURE: 98.7 F | BODY MASS INDEX: 51.04 KG/M2 | HEART RATE: 68 BPM | DIASTOLIC BLOOD PRESSURE: 66 MMHG

## 2019-08-19 DIAGNOSIS — Z98.84 STATUS POST LAPAROSCOPIC SLEEVE GASTRECTOMY: Primary | ICD-10-CM

## 2019-08-19 DIAGNOSIS — E78.5 HYPERLIPIDEMIA, UNSPECIFIED HYPERLIPIDEMIA TYPE: ICD-10-CM

## 2019-08-19 DIAGNOSIS — E66.01 MORBID OBESITY WITH BMI OF 50.0-59.9, ADULT (HCC): ICD-10-CM

## 2019-08-19 PROCEDURE — 97140 MANUAL THERAPY 1/> REGIONS: CPT

## 2019-08-19 PROCEDURE — 99024 POSTOP FOLLOW-UP VISIT: CPT | Performed by: PHYSICIAN ASSISTANT

## 2019-08-19 PROCEDURE — 97110 THERAPEUTIC EXERCISES: CPT

## 2019-08-19 RX ORDER — THIAMINE HCL 100 MG
2500 TABLET ORAL DAILY
COMMUNITY
End: 2020-06-16 | Stop reason: ALTCHOICE

## 2019-08-19 RX ORDER — SIMETHICONE 125 MG
125 TABLET,CHEWABLE ORAL EVERY 6 HOURS PRN
COMMUNITY
End: 2019-09-25

## 2019-08-19 NOTE — PROGRESS NOTES
[place]     Time In: 0581  Time Out: 1104  Minutes: 47  Timed Code Treatment Minutes: 52 Minutes    Date: 2019  Patient Name: Calderon Witt,  Gender:  female        CSN: [id number]   : 1970  (54 y.o.)       Referring Practitioner: Oswald Faith DPM      Diagnosis: Plantar fasciitis, right, achilles tendon  Treatment Diagnosis: chronic right foot/ankle pain, decreased right ankle AROM, right ankle weakness, difficulty ambulating   Additional Pertinent Hx: PMH: Obesity. L ankle surgery in . General:  PT Visit Information  Onset Date: 19  PT Insurance Information: [employer] Medicaid- allowed 30 visits, precert required after 19GE visit; aquatics and modalities covered except ionto, HP/CP  Total # of Visits Approved: 30  Total # of Visits to Date: 6  Plan of Care/Certification Expiration Date: 19  Progress Note Counter: 6/10 for PN. Subjective:  Chart Reviewed: Yes  Comments: Follow up with Dr. Ruapl Santos 19. Subjective: Pt presents with roll about and wearig boot. Pt reports right foot was sore yesterday so didn't use walker much and not using today d/t multiple appointments. Pain:  Patient Currently in Pain: Denies         Objective         Exercises  Exercise 1: 3 way ankle df towel stretch x30 sec hold each. 4 way ankle peach tband right x15 each  Exercise 2: Toe curls x15  Exercise 3: Astym treatment to R lower leg x15 min- soft tissue restriction noted in lateral ankle, ankle mortise, achilles tendon, medial heel. Had pt left sidelying instead of prone d/t abdominal incision.    Exercise 4: seated heel/toe raises x15  Exercise 5: Standing weight shifts lateral and step stance (R foot forward) x2 min  Exercise 6: Seated wooden BAPS circles CW/CCW x15 each  Exercise 7: Standing gastroc stretch x30 sec hold         Activity Tolerance:  Activity Tolerance: Patient

## 2019-08-19 NOTE — PROGRESS NOTES
[facility]  [address], [address]  [place]      Visit Date:  8/19/2019  Weight Management Postop Follow-up    HPI:      Brenda Quesada is a 54 y.o. female who is here today for 1 weeks follow up since  robotic-assisted sleeve gastrectomy performed by Dr. Cira Kim  on 8/12/19. Pt reports that she is doing well. Pt reports that she is drinking 32-52 oz of fluid and 62 grams of protein daily. Drinking 2 protein shakes daily. C/o gas pressure- has been taking Gas-x twice daily with minimal benefit. She is standing when drinking to help fluids pass. Had diarrhea twice over the last week. Noticed bright red blood on her toilet paper when she wiped once this week. She has had several BM's since and has not had any blood. Will monitor and call if it returns. No nausea or emesis. Denies heartburn/ GERD - continues to take PPI. Denies sx of dehydration. No fever or chills. Denies CP/SOB. No Abdominal pain. Minimal incisional tenderness. Taking and tolerating all vitamins. Off all pain meds. Taking Lovenox, as rx. Total weight loss since surgery is 7 pounds. Continue to take cholesterol medication, as rx. Physical Activity:  Walking    Days per week: daily  Time per session: 30 minutes    Current BMI: Body mass index is 50.78 kg/m².   Current Weight:   Wt Readings from Last 3 Encounters:   08/19/19 260 lb (117.9 kg)   08/12/19 271 lb (122.9 kg)   08/09/19 267 lb 12.8 oz (121.5 kg)     Pre-op Body [id number]      Past Medical History:  Past Medical History:   Diagnosis Date    Arthritis     Hyperlipidemia        Past Surgical History:  Past Surgical History:   Procedure Laterality Date    ANKLE SURGERY Left 2006    ANKLE SURGERY Right 03/2019    plate and screws    COLONOSCOPY      FRACTURE SURGERY      HYSTERECTOMY, TOTAL ABDOMINAL      HYSTEROPLASTY      SLEEVE GASTRECTOMY N/A 8/12/2019    ROBOTIC SLEEVE GASTRECTOMY performed by Jessica Ross Jj Zavala MD at [address] [facility] [address] N/A 2/18/2019    EGD WITH BX performed by Татьяна Horton MD at [address] [facility]    WRIST SURGERY Right 2014    plate and screws       Past Social History:  Social History     Socioeconomic History    Marital status:      Spouse name: Not on file    Number of children: Not on file    Years of education: Not on file    Highest education level: Not on file   Occupational History    Not on file   Social Needs    Financial resource strain: Not on file    Food insecurity:     Worry: Not on file     Inability: Not on file    Transportation needs:     Medical: Not on file     Non-medical: Not on file   Tobacco Use    Smoking status: Former Smoker     Packs/day: 1.00     Types: Cigarettes     Last attempt to quit: 2/18/2004     Years since quitting: 15.5    Smokeless tobacco: Never Used   Substance and Sexual Activity    Alcohol use: Yes     Comment: rare    Drug use: No    Sexual activity: Not on file   Lifestyle    Physical activity:     Days per week: Not on file     Minutes per session: Not on file    Stress: Not on file   Relationships    Social connections:     Talks on phone: Not on file     Gets together: Not on file     Attends Jehovah's witness service: Not on file     Active member of club or organization: Not on file     Attends meetings of clubs or organizations: Not on file     Relationship status: Not on file    Intimate partner violence:     Fear of current or ex partner: Not on file     Emotionally abused: Not on file     Physically abused: Not on file     Forced sexual activity: Not on file   Other Topics Concern    Not on file   Social History Narrative    Not on file        Medications:   Current Outpatient Medications   Medication Sig Dispense Refill    Cyanocobalamin (VITAMIN B-12) 2500 MCG SUBL Place 2,500 mcg under the tongue daily      simethicone (GAS-X EXTRA STRENGTH) 125 MG chewable tablet Take 125 mg by mouth every 6 hours as needed for Flatulence      oxyCODONE (ROXICODONE) 5 MG immediate release tablet Take 1 tablet by mouth every 6 hours as needed for Pain for up to 7 days. 28 tablet 0    Calcium Citrate-Vitamin D (CELEBRATE CALCIUM CITRATE PO) Take by mouth 2 times daily powder      Multiple Vitamins-Minerals (MULTIVITAMIN ADULT PO) Take by mouth daily Liquid      Iron Carbonyl-Vitamin C-FOS (CHEWABLE IRON PO) Take by mouth daily      enoxaparin (LOVENOX) 40 MG/0.4ML injection Inject 0.4 mLs into the skin daily for 14 days 14 Syringe 0    omeprazole (PRILOSEC) 40 MG delayed release capsule Take 1 capsule by mouth daily Open capsule and take in liquid 30 capsule 2    Cholecalciferol (VITAMIN D3) 2000 units TABS Take by mouth daily      atorvastatin (LIPITOR) 40 MG tablet Take 40 mg by mouth nightly       hyoscyamine (OSCIMIN) 125 MCG TBDP dispersible tablet Place 1 tablet under the tongue every 4 hours for 30 doses (Patient not taking: Reported on 8/19/2019) 30 tablet 0    ondansetron (ZOFRAN ODT) 4 MG disintegrating tablet Take 1 tablet by mouth every 8 hours as needed for Nausea or Vomiting (Patient not taking: Reported on 8/19/2019) 30 tablet 0    Eluxadoline (VIBERZI) 75 MG TABS Take 0.5 tablets by mouth daily.  Docusate Sodium 100 MG TABS Take 100 mg by mouth 2 times daily Take as needed to prevent constipation (Patient not taking: Reported on 8/9/2019) 30 tablet 1    metoclopramide (REGLAN) 10 MG tablet Take 1 tablet by mouth every 6 hours as needed (nausea/vomiting) (Patient not taking: Reported on 8/9/2019) 30 tablet 0     No current facility-administered medications for this visit. Allergies: Allergies   Allergen Reactions    Celebrex [Celecoxib] Other (See Comments)     Patient states double vision    Toradol [Ketorolac Tromethamine]      Not sure of reaction    Morphine Rash       Subjective:    Review of Systems:  Constitutional: Denies any fever, chills, fatigue.   Wound: Denies any  08/13/2019    K 4.0 08/13/2019     08/13/2019    CO2 21 08/13/2019    CALCIUM 8.9 08/13/2019    AST 22 11/19/2018    ALKPHOS 92 11/19/2018    PROT 7.7 11/19/2018    LABALBU 4.4 11/19/2018    BILITOT 0.5 11/19/2018    ALT 31 11/19/2018        PREALBUMIN   Lab Results   Component Value Date    PREALBUMIN 26.0 11/19/2018        VITAMIN B12   Lab Results   Component Value Date    ZAKBYQZN20 638 11/19/2018        24 HOUR URINE CALCIUM   No results found for: Joe Hotter, CALCIUMUR     VITAMIN D   Lab Results   Component Value Date    VITD25 47.10 06/10/2019        VITAMIN B1/ THIAMINE   Lab Results   Component Value Date    LGNR2VIPAAV 120 11/19/2018        RBC FOLATE   Lab Results   Component Value Date    FOLATE > 20.0 11/19/2018        LIPID SCREEN (FASTING)   Lab Results   Component Value Date    CHOL 167 11/19/2018    TRIG 207 11/19/2018    HDL 44 11/19/2018    LDLCALC 82 11/19/2018   ,     HGA1C (T2DM ONLY)   Lab Results   Component Value Date    LABA1C 5.9 11/19/2018    AVGG 117 11/19/2018        TSH   Lab Results   Component Value Date    TSH 3.560 11/19/2018        IRON   Lab Results   Component Value Date    IRON 69 11/19/2018        IONIZED CALCIUM   No results found for: MACKENZIE DALLAS      Assessment:       Diagnosis Orders   1. Status post laparoscopic sleeve gastrectomy     2. Morbid obesity with BMI of 50.0-59.9, adult ([facility] [address]     3. Hyperlipidemia, unspecified hyperlipidemia type       Plan:     Stay well hydrated. Drink a minimum of 64 oz of non-carbonated, non-caffeinated fluids daily.  Nutritional education occurred during visit. Tolerating diet. Continue following  with dietitian and follow their recommendations as directed. Continue  60-80 grams of protein each day.   Signs and symptoms reviewed with patient that would be concerning and need her to return to office for re-evaluation. Patient will call if any questions or concerns arrise.    No lifting, pushing, pulling over

## 2019-08-19 NOTE — TELEPHONE ENCOUNTER
\"Can I start aqua therapy? For my right foot. ? \" Dr. Romayne Cleaver wants patients to wait 6 weeks before they get in a pool[place].  Sept 30 would be 6 wks post op -pt voiced understanding

## 2019-08-21 ENCOUNTER — HOSPITAL ENCOUNTER (OUTPATIENT)
Dept: PHYSICAL THERAPY | Age: 49
Setting detail: THERAPIES SERIES
Discharge: HOME | End: 2019-08-21
Payer: MEDICAID

## 2019-08-21 PROCEDURE — 97110 THERAPEUTIC EXERCISES: CPT

## 2019-08-21 PROCEDURE — 97140 MANUAL THERAPY 1/> REGIONS: CPT

## 2019-08-23 ENCOUNTER — HOSPITAL ENCOUNTER (OUTPATIENT)
Dept: PHYSICAL THERAPY | Age: 49
Setting detail: THERAPIES SERIES
Discharge: HOME | End: 2019-08-23
Payer: MEDICAID

## 2019-08-23 PROCEDURE — 97116 GAIT TRAINING THERAPY: CPT

## 2019-08-23 PROCEDURE — 97140 MANUAL THERAPY 1/> REGIONS: CPT

## 2019-08-23 PROCEDURE — 97110 THERAPEUTIC EXERCISES: CPT

## 2019-08-23 NOTE — PROGRESS NOTES
with each device- good push off, decreased right heel strike; step length normalizing; steady with cane         Activity Tolerance:  Activity Tolerance: Patient Tolerated treatment well    Assessment: Body structures, Functions, Activity limitations: Decreased functional mobility , Decreased ROM, Decreased strength, Decreased balance, Increased Pain  Assessment: Continued with Astym treatment to right lower leg to decrease soft tissue fibrosis and promote faciliation of healthy tissue to decrease pain and improve mobility. Pt slowly progressing toward goals. No change in right ankle df or eversion ROM, with strength continuing to be limited. Pt starting to do more walking with SW and safe to walk with cane in house. Pt will benefit from continued therapy with focus on strengthening, balance and gait. Prognosis: Good       Patient Education:  Patient Education: Continue with HEP. Ambulate with cane in house, try do longer community distances with SW.        Plan:  Times per week: 2-3x  Times per day: Daily  Plan weeks: 8 weeks  Current Treatment Recommendations: Strengthening, ROM, Balance Training, Functional Mobility Training, Gait Training, Home Exercise Program, Manual Therapy - Soft Tissue Mobilization, Patient/Caregiver Education & Training, Modalities  Plan Comment: Continue with current POC. Goals:  Patient goals : Reduce right foot pain    Short term goals  Time Frame for Short term goals: 4 weeks  Short term goal 1: Pt will report <3/10 right heel/achilles tendon pain at worst to tolerate standing and walking longer durations. MET- Pt reports pain only occurs during Astym at lateral ankle and during standing heel raises but abolished upon stopping. Short term goal 2: Pt will improve AROM right ankle df from -12 to -6 deg, pf from 42 to 50 deg, inversion from 12 to 20 deg, and eversion from 1-2 deg to 8 degrees to assist with transfers and normalizing gait pattern.  NOT MET- right ankle df -12 deg, pf

## 2019-08-27 ENCOUNTER — OFFICE VISIT (OUTPATIENT)
Dept: BARIATRICS/WEIGHT MGMT | Age: 49
End: 2019-08-27

## 2019-08-27 VITALS
DIASTOLIC BLOOD PRESSURE: 78 MMHG | SYSTOLIC BLOOD PRESSURE: 114 MMHG | BODY MASS INDEX: 50.45 KG/M2 | TEMPERATURE: 98.4 F | HEIGHT: 60 IN | WEIGHT: 257 LBS | HEART RATE: 68 BPM

## 2019-08-27 DIAGNOSIS — E78.5 HYPERLIPIDEMIA, UNSPECIFIED HYPERLIPIDEMIA TYPE: ICD-10-CM

## 2019-08-27 DIAGNOSIS — Z13.21 SCREENING FOR MALNUTRITION: ICD-10-CM

## 2019-08-27 DIAGNOSIS — Z98.84 STATUS POST LAPAROSCOPIC SLEEVE GASTRECTOMY: Primary | ICD-10-CM

## 2019-08-27 DIAGNOSIS — Z98.84 STATUS POST BARIATRIC SURGERY: Primary | ICD-10-CM

## 2019-08-27 DIAGNOSIS — K91.2 POSTSURGICAL MALABSORPTION: ICD-10-CM

## 2019-08-27 DIAGNOSIS — E66.01 MORBID OBESITY WITH BMI OF 50.0-59.9, ADULT (HCC): ICD-10-CM

## 2019-08-27 PROCEDURE — 99024 POSTOP FOLLOW-UP VISIT: CPT | Performed by: PHYSICIAN ASSISTANT

## 2019-08-27 NOTE — PROGRESS NOTES
patient. See Additional Instructions below. -  Patient Instructions   1. Begin to set aside three meal times per day about 1/4 cup portion size. Important to have set meal times so can still focus on adequate fluid intake between meals and get your protein in.  2.  Now is when you want to separate your liquids from solids. No liquids 30 minutes before your meals and no liquids 30 minutes after your meals. Water goal at least 64 oz per day  3. Continue to drink protein shakes between meals as can not get enough protein from food alone at this time. Goal is 60-80 grams protein per day. 4.  Continue taking bariatric vitamins as currently taking. Get  your six week post op lab work completed 5-7 days prior to next office visit.       Recommended Follow-Up: six week post op with PA and CELINA Roy RD, LD  Dietitian- [facility]

## 2019-08-28 ENCOUNTER — TELEPHONE (OUTPATIENT)
Dept: BARIATRICS/WEIGHT MGMT | Age: 49
End: 2019-08-28

## 2019-08-28 ENCOUNTER — HOSPITAL ENCOUNTER (OUTPATIENT)
Dept: PHYSICAL THERAPY | Age: 49
Setting detail: THERAPIES SERIES
Discharge: HOME | End: 2019-08-28
Payer: MEDICAID

## 2019-08-28 PROCEDURE — 97110 THERAPEUTIC EXERCISES: CPT

## 2019-08-28 PROCEDURE — 97140 MANUAL THERAPY 1/> REGIONS: CPT

## 2019-08-28 NOTE — PROGRESS NOTES
activities. Assessment: Body structures, Functions, Activity limitations: Decreased functional mobility , Decreased ROM, Decreased strength, Decreased balance, Increased Pain  Assessment: Continued with Astym treatment to right lower leg to decrease soft tissue fibrosis and promote faciliation of healthy tissue to decrease pain and improve mobility. Minimal tissue restriction noted in right lower leg. Initiated open chain 3 way hip and B hamstring curls standing to improve strength for gait. Pt only scheduled for 30 minutes, so unable to complete all prior exercises. Prognosis: Good       Patient Education:  Patient Education: Continue with HEP. Monitor response to progressions. Plan:  Times per week: 2-3x  Times per day: Daily  Plan weeks: 8 weeks  Current Treatment Recommendations: Strengthening, ROM, Balance Training, Functional Mobility Training, Gait Training, Home Exercise Program, Manual Therapy - Soft Tissue Mobilization, Patient/Caregiver Education & Training, Modalities  Plan Comment: Continue with current POC. Goals:  Patient goals : Reduce right foot pain    Short term goals  Time Frame for Short term goals: 4 weeks  Short term goal 1: Pt will report <3/10 right heel/achilles tendon pain at worst to tolerate standing and walking longer durations. MET- Pt reports pain only occurs during Astym at lateral ankle and during standing heel raises but abolished upon stopping. Short term goal 2: Pt will improve AROM right ankle df from -12 to -6 deg, pf from 42 to 50 deg, inversion from 12 to 20 deg, and eversion from 1-2 deg to 8 degrees to assist with transfers and normalizing gait pattern. NOT MET- right ankle df -12 deg, pf 53 deg, inversion 20 deg, eversion 1 deg. Short term goal 3: Pt will improve right ankle strength from 2-/5 to 3/5 to tolerate FWB and weight shifting for gait. NOT MET- right ankle strength 2-/5 d/t lacking full ROM.   Short term goal 4: Pt will ambulate with SW x50ft with good heel/toe pattern and near normal step length with/without boot for household mobility. MET- Pt ambulates with SW x25ft with good toe push off, but lacks heel strike d/t limited df ROM, and equal step length; Pt able to ambulate 30ft with straight cane supervision with good technique. Long term goals  Time Frame for Long term goals : 8 weeks  Long term goal 1: Pt will improve AROM right ankle df to neutral, pf to 60 deg, inversion to 30 deg and eversion to 10 deg for ease squatting and walking. NOT MET. Long term goal 2: Pt will improve right ankle strength to 4-/5 to tolerate SLS x3 sec to prevent falls when walking on uneven surfaces. NOT MET. Long term goal 3: Pt will ambulate without AD >50ft with minimal deviations/antalgia for household and short community mobility. NOT MET. Long term goal 4: Pt will be independent and compliant with HEP to achieve above goals. ONGOING-Pt verbalizes compliance with current HEP daily.      Janae Keenan, PT

## 2019-08-30 ENCOUNTER — HOSPITAL ENCOUNTER (OUTPATIENT)
Dept: PHYSICAL THERAPY | Age: 49
Setting detail: THERAPIES SERIES
Discharge: HOME | End: 2019-08-30
Payer: MEDICAID

## 2019-08-30 PROCEDURE — 97140 MANUAL THERAPY 1/> REGIONS: CPT

## 2019-08-30 PROCEDURE — 97110 THERAPEUTIC EXERCISES: CPT

## 2019-08-30 ASSESSMENT — PAIN SCALES - GENERAL: PAINLEVEL_OUTOF10: 5

## 2019-08-30 ASSESSMENT — PAIN DESCRIPTION - LOCATION: LOCATION: FOOT

## 2019-08-30 ASSESSMENT — PAIN DESCRIPTION - ORIENTATION: ORIENTATION: RIGHT

## 2019-08-30 NOTE — PROGRESS NOTES
stopping. Short term goal 2: Pt will improve AROM right ankle df from -12 to -6 deg, pf from 42 to 50 deg, inversion from 12 to 20 deg, and eversion from 1-2 deg to 8 degrees to assist with transfers and normalizing gait pattern. NOT MET- right ankle df -12 deg, pf 53 deg, inversion 20 deg, eversion 1 deg. Short term goal 3: Pt will improve right ankle strength from 2-/5 to 3/5 to tolerate FWB and weight shifting for gait. NOT MET- right ankle strength 2-/5 d/t lacking full ROM. Short term goal 4: Pt will ambulate with SW x50ft with good heel/toe pattern and near normal step length with/without boot for household mobility. MET- Pt ambulates with SW x25ft with good toe push off, but lacks heel strike d/t limited df ROM, and equal step length; Pt able to ambulate 30ft with straight cane supervision with good technique. Long term goals  Time Frame for Long term goals : 8 weeks  Long term goal 1: Pt will improve AROM right ankle df to neutral, pf to 60 deg, inversion to 30 deg and eversion to 10 deg for ease squatting and walking. NOT MET. Long term goal 2: Pt will improve right ankle strength to 4-/5 to tolerate SLS x3 sec to prevent falls when walking on uneven surfaces. NOT MET. Long term goal 3: Pt will ambulate without AD >50ft with minimal deviations/antalgia for household and short community mobility. NOT MET. Long term goal 4: Pt will be independent and compliant with HEP to achieve above goals. ONGOING-Pt verbalizes compliance with current HEP daily.      Jerilyn Ceja, PT

## 2019-09-04 ENCOUNTER — HOSPITAL ENCOUNTER (OUTPATIENT)
Dept: PHYSICAL THERAPY | Age: 49
Setting detail: THERAPIES SERIES
Discharge: HOME | End: 2019-09-04
Payer: MEDICAID

## 2019-09-04 PROCEDURE — 97140 MANUAL THERAPY 1/> REGIONS: CPT

## 2019-09-04 PROCEDURE — 97110 THERAPEUTIC EXERCISES: CPT

## 2019-09-04 PROCEDURE — 97124 MASSAGE THERAPY: CPT

## 2019-09-04 ASSESSMENT — PAIN DESCRIPTION - ORIENTATION: ORIENTATION: RIGHT

## 2019-09-04 ASSESSMENT — PAIN DESCRIPTION - LOCATION: LOCATION: ANKLE

## 2019-09-04 ASSESSMENT — PAIN SCALES - GENERAL: PAINLEVEL_OUTOF10: 2

## 2019-09-04 NOTE — PROGRESS NOTES
Standing plantar fascia stretch with foot on half PVC pipe 2x30 sec hold  Exercise 11: Deep tissue massage to right plantar fascia x8 min -very tender along medial great toe extensor tendon         Activity Tolerance:  Activity Tolerance: Patient Tolerated treatment well  Activity Tolerance: Pt reports 4-5/10 right plantar foot pain at end of session. Assessment: Body structures, Functions, Activity limitations: Decreased functional mobility , Decreased ROM, Decreased strength, Decreased balance, Increased Pain  Assessment: Continued with Astym treatment to right lower leg to decrease soft tissue fibrosis and promote faciliation of healthy tissue to decrease pain and improve mobility. Right lateral ankle and plantar fasica/medial heel tenderness noted. Initiated deep tissue massage to plantar fascia with tenderness noted. Resumed standing exercises and plantar fascia stretch which increased pain upon stretch release. Prognosis: Good       Patient Education:  Patient Education: Continue with HEP. Try using massager on plantar foot and putting weight through ice bottle for massage. Monitor symptoms. Plan:  Times per week: 2-3x  Times per day: Daily  Plan weeks: 8 weeks  Current Treatment Recommendations: Strengthening, ROM, Balance Training, Functional Mobility Training, Gait Training, Home Exercise Program, Manual Therapy - Soft Tissue Mobilization, Patient/Caregiver Education & Training, Modalities  Plan Comment: Continue with current POC. Goals:  Patient goals : Reduce right foot pain    Short term goals  Time Frame for Short term goals: 4 weeks  Short term goal 1: Previous goal met. Short term goal 2: Pt will improve AROM right ankle df from -12 to -6 deg, pf from 42 to 50 deg, inversion from 12 to 20 deg, and eversion from 1-2 deg to 8 degrees to assist with transfers and normalizing gait pattern. NOT MET- right ankle df -12 deg, pf 53 deg, inversion 20 deg, eversion 1 deg.    Short term goal 3: Pt will improve right ankle strength from 2-/5 to 3/5 to tolerate FWB and weight shifting for gait. NOT MET- right ankle strength 2-/5 d/t lacking full ROM. Short term goal 4: Previous goal met. Long term goals  Time Frame for Long term goals : 8 weeks  Long term goal 1: Pt will improve AROM right ankle df to neutral, pf to 60 deg, inversion to 30 deg and eversion to 10 deg for ease squatting and walking. NOT MET. Long term goal 2: Pt will improve right ankle strength to 4-/5 to tolerate SLS x3 sec to prevent falls when walking on uneven surfaces. NOT MET. Long term goal 3: Pt will ambulate without AD >50ft with minimal deviations/antalgia for household and short community mobility. NOT MET. Long term goal 4: Pt will be independent and compliant with HEP to achieve above goals. ONGOING-Pt verbalizes compliance with current HEP daily.      Hazel Oshea, PT

## 2019-09-06 ENCOUNTER — HOSPITAL ENCOUNTER (OUTPATIENT)
Dept: PHYSICAL THERAPY | Age: 49
Setting detail: THERAPIES SERIES
Discharge: HOME | End: 2019-09-06
Payer: MEDICAID

## 2019-09-06 PROCEDURE — 97110 THERAPEUTIC EXERCISES: CPT

## 2019-09-06 PROCEDURE — 97124 MASSAGE THERAPY: CPT

## 2019-09-06 PROCEDURE — 97140 MANUAL THERAPY 1/> REGIONS: CPT

## 2019-09-06 NOTE — PROGRESS NOTES
Tolerance: Patient Tolerated treatment well  Activity Tolerance: Pt reports tenderness in plantar foot at end session. Assessment: Body structures, Functions, Activity limitations: Decreased functional mobility , Decreased ROM, Decreased strength, Decreased balance, Increased Pain  Assessment: Continued with Astym treatment to right lower leg to decrease soft tissue fibrosis and promote faciliation of healthy tissue to decrease pain and improve mobility. Right lateral ankle and plantar fasica/medial heel tenderness noted. Continued with deep massage to plantar fascia to decrease pain. Initiated rockerboard, step stance balance on foam, and standing BAPS board with tenderness at end of session. Prognosis: Good       Patient Education:  Patient Education: Continue with HEP, use of massager. Monitor symptoms with progressions. Plan:  Times per week: 2-3x  Times per day: Daily  Plan weeks: 8 weeks  Current Treatment Recommendations: Strengthening, ROM, Balance Training, Functional Mobility Training, Gait Training, Home Exercise Program, Manual Therapy - Soft Tissue Mobilization, Patient/Caregiver Education & Training, Modalities  Plan Comment: Continue with current POC. Goals:  Patient goals : Reduce right foot pain    Short term goals  Time Frame for Short term goals: 4 weeks  Short term goal 1: Previous goal met. Short term goal 2: Pt will improve AROM right ankle df from -12 to -6 deg, pf from 42 to 50 deg, inversion from 12 to 20 deg, and eversion from 1-2 deg to 8 degrees to assist with transfers and normalizing gait pattern. NOT MET- right ankle df -12 deg, pf 53 deg, inversion 20 deg, eversion 1 deg. Short term goal 3: Pt will improve right ankle strength from 2-/5 to 3/5 to tolerate FWB and weight shifting for gait. NOT MET- right ankle strength 2-/5 d/t lacking full ROM. Short term goal 4: Previous goal met.      Long term goals  Time Frame for Long term goals : 8 weeks  Long term goal 1:

## 2019-09-09 ENCOUNTER — HOSPITAL ENCOUNTER (OUTPATIENT)
Dept: PHYSICAL THERAPY | Age: 49
Setting detail: THERAPIES SERIES
Discharge: HOME | End: 2019-09-09
Payer: MEDICAID

## 2019-09-09 PROCEDURE — 97035 APP MDLTY 1+ULTRASOUND EA 15: CPT

## 2019-09-09 PROCEDURE — 97110 THERAPEUTIC EXERCISES: CPT

## 2019-09-09 PROCEDURE — 97140 MANUAL THERAPY 1/> REGIONS: CPT

## 2019-09-11 ENCOUNTER — APPOINTMENT (OUTPATIENT)
Dept: PHYSICAL THERAPY | Age: 49
End: 2019-09-11
Payer: MEDICAID

## 2019-09-13 ENCOUNTER — HOSPITAL ENCOUNTER (OUTPATIENT)
Dept: PHYSICAL THERAPY | Age: 49
Setting detail: THERAPIES SERIES
Discharge: HOME | End: 2019-09-13
Payer: MEDICAID

## 2019-09-13 PROCEDURE — 97140 MANUAL THERAPY 1/> REGIONS: CPT

## 2019-09-13 PROCEDURE — 97035 APP MDLTY 1+ULTRASOUND EA 15: CPT

## 2019-09-13 PROCEDURE — 97110 THERAPEUTIC EXERCISES: CPT

## 2019-09-16 ENCOUNTER — HOSPITAL ENCOUNTER (OUTPATIENT)
Dept: PHYSICAL THERAPY | Age: 49
Setting detail: THERAPIES SERIES
Discharge: HOME | End: 2019-09-16
Payer: MEDICAID

## 2019-09-16 PROCEDURE — 97035 APP MDLTY 1+ULTRASOUND EA 15: CPT

## 2019-09-16 PROCEDURE — 97110 THERAPEUTIC EXERCISES: CPT

## 2019-09-16 PROCEDURE — 97124 MASSAGE THERAPY: CPT

## 2019-09-16 ASSESSMENT — PAIN DESCRIPTION - PAIN TYPE: TYPE: CHRONIC PAIN

## 2019-09-16 ASSESSMENT — PAIN DESCRIPTION - ORIENTATION: ORIENTATION: RIGHT

## 2019-09-16 ASSESSMENT — PAIN DESCRIPTION - LOCATION: LOCATION: ANKLE

## 2019-09-16 ASSESSMENT — PAIN SCALES - GENERAL: PAINLEVEL_OUTOF10: 3

## 2019-09-18 ENCOUNTER — HOSPITAL ENCOUNTER (OUTPATIENT)
Dept: PHYSICAL THERAPY | Age: 49
Setting detail: THERAPIES SERIES
Discharge: HOME | End: 2019-09-18
Payer: MEDICAID

## 2019-09-18 PROCEDURE — 97110 THERAPEUTIC EXERCISES: CPT

## 2019-09-18 PROCEDURE — 97035 APP MDLTY 1+ULTRASOUND EA 15: CPT

## 2019-09-18 ASSESSMENT — PAIN SCALES - GENERAL: PAINLEVEL_OUTOF10: 4

## 2019-09-18 ASSESSMENT — PAIN DESCRIPTION - LOCATION: LOCATION: FOOT

## 2019-09-18 ASSESSMENT — PAIN DESCRIPTION - ORIENTATION: ORIENTATION: RIGHT

## 2019-09-18 NOTE — PROGRESS NOTES
-use dycem next session         Activity Tolerance:  Activity Tolerance: Patient Tolerated treatment well  Activity Tolerance: Pt reports minimal to no change in soreness at end of session. Assessment: Body structures, Functions, Activity limitations: Decreased functional mobility , Decreased ROM, Decreased strength, Decreased balance, Increased Pain  Assessment: Initiated Airdyne bike with soreness toward end of 5 minutes. Pt performed stretches and balance activities, and held step ups d/t increased soreness today. Ended with ultrasound to right lateral foot to decrease inflammation and soreness. Plan to reassess goals next visit but continue with alternating land and pool. Prognosis: Good       Patient Education:  Patient Education: Continue with HEP. Oriented to pool area and reviewed guidelines. Plan:  Times per week: 2-3x  Times per day: Daily  Plan weeks: 8 weeks  Current Treatment Recommendations: Strengthening, ROM, Balance Training, Functional Mobility Training, Gait Training, Home Exercise Program, Manual Therapy - Soft Tissue Mobilization, Patient/Caregiver Education & Training, Modalities  Plan Comment: Continue with current POC. Goals:  Patient goals : Reduce right foot pain    Short term goals  Time Frame for Short term goals: 4 weeks  Short term goal 1: Previous goal met. Short term goal 2: Pt will improve AROM right ankle df from -12 to -6 deg, pf from 42 to 50 deg, inversion from 12 to 20 deg, and eversion from 1-2 deg to 8 degrees to assist with transfers and normalizing gait pattern. NOT MET- right ankle df -12 deg, pf 53 deg, inversion 20 deg, eversion 1 deg. Short term goal 3: Pt will improve right ankle strength from 2-/5 to 3/5 to tolerate FWB and weight shifting for gait. NOT MET- right ankle strength 2-/5 d/t lacking full ROM. Short term goal 4: Previous goal met.      Long term goals  Time Frame for Long term goals : 8 weeks  Long term goal 1: Pt will improve AROM right ankle df to neutral, pf to 60 deg, inversion to 30 deg and eversion to 10 deg for ease squatting and walking. NOT MET. Long term goal 2: Pt will improve right ankle strength to 4-/5 to tolerate SLS x3 sec to prevent falls when walking on uneven surfaces. NOT MET. Long term goal 3: Pt will ambulate without AD >50ft with minimal deviations/antalgia for household and short community mobility. NOT MET. Long term goal 4: Pt will be independent and compliant with HEP to achieve above goals. ONGOING-Pt verbalizes compliance with current HEP daily.      Kirill Slade, PT

## 2019-09-19 LAB
A/G RATIO: 1.6 (ref 1.5–2.5)
ABSOLUTE BASO #: 100 /CMM (ref 0–200)
ABSOLUTE EOS #: 300 /CMM (ref 0–500)
ABSOLUTE LYMPH #: 3000 /CMM (ref 1000–4800)
ABSOLUTE MONO #: 400 /CMM (ref 0–800)
ABSOLUTE NEUT #: 2700 /CMM (ref 1800–7700)
ALBUMIN SERPL-MCNC: 4.4 GM/DL (ref 3.5–5)
ALP BLD-CCNC: 95 IU/L (ref 39–118)
ALT SERPL-CCNC: 28 IU/L (ref 10–40)
ANION GAP SERPL CALCULATED.3IONS-SCNC: 9 MMOL/L (ref 4–12)
AST SERPL-CCNC: 24 IU/L (ref 15–41)
BASOPHILS RELATIVE PERCENT: 0.9 % (ref 0–2)
BILIRUB SERPL-MCNC: 0.8 MG/DL (ref 0.2–1)
BUN BLDV-MCNC: 7 MG/DL (ref 7–20)
CALCIUM SERPL-MCNC: 9.6 MG/DL (ref 8.8–10.5)
CHLORIDE BLD-SCNC: 105 MEQ/L (ref 101–111)
CO2: 27 MEQ/L (ref 21–32)
CREAT SERPL-MCNC: 0.63 MG/DL (ref 0.6–1.3)
CREATININE CLEARANCE: >60
EOSINOPHILS RELATIVE PERCENT: 4.2 % (ref 0–6)
GLUCOSE: 104 MG/DL (ref 70–110)
HCT VFR BLD CALC: 40.3 % (ref 35–44)
HEMOGLOBIN: 13 GM/DL (ref 12–15)
LYMPHOCYTES RELATIVE PERCENT: 46.1 % (ref 15–45)
MCH RBC QN AUTO: 28.5 PG (ref 27.5–33)
MCHC RBC AUTO-ENTMCNC: 32.3 GM/DL (ref 33–36)
MCV RBC AUTO: 88.3 CU MIC (ref 80–97)
MONOCYTES RELATIVE PERCENT: 6.9 % (ref 2–10)
NEUTROPHILS RELATIVE PERCENT: 41.9 % (ref 40–70)
NUCLEATED RBCS: 0 /100 WBC
PDW BLD-RTO: 14.2 % (ref 12–16)
PLATELET # BLD: 314 TH/CMM (ref 150–400)
POTASSIUM SERPL-SCNC: 3.7 MEQ/L (ref 3.6–5)
PREALBUMIN: 20 MG/DL (ref 16–38)
RBC # BLD: 4.57 MIL/CMM (ref 4–5.1)
SODIUM BLD-SCNC: 141 MEQ/L (ref 135–145)
TOTAL PROTEIN: 7.2 G/DL (ref 6.2–8)
WBC # BLD: 6.5 TH/CMM (ref 4.4–10.5)

## 2019-09-20 ENCOUNTER — HOSPITAL ENCOUNTER (OUTPATIENT)
Dept: PHYSICAL THERAPY | Age: 49
Setting detail: THERAPIES SERIES
Discharge: HOME | End: 2019-09-20
Payer: MEDICAID

## 2019-09-23 ENCOUNTER — HOSPITAL ENCOUNTER (OUTPATIENT)
Dept: PHYSICAL THERAPY | Age: 49
Setting detail: THERAPIES SERIES
Discharge: HOME | End: 2019-09-23
Payer: MEDICAID

## 2019-09-23 PROCEDURE — 97113 AQUATIC THERAPY/EXERCISES: CPT

## 2019-09-25 ENCOUNTER — OFFICE VISIT (OUTPATIENT)
Dept: BARIATRICS/WEIGHT MGMT | Age: 49
End: 2019-09-25

## 2019-09-25 VITALS
HEIGHT: 60 IN | HEART RATE: 68 BPM | BODY MASS INDEX: 48.69 KG/M2 | TEMPERATURE: 97.4 F | SYSTOLIC BLOOD PRESSURE: 106 MMHG | WEIGHT: 248 LBS | DIASTOLIC BLOOD PRESSURE: 62 MMHG

## 2019-09-25 DIAGNOSIS — Z98.84 STATUS POST BARIATRIC SURGERY: Primary | ICD-10-CM

## 2019-09-25 DIAGNOSIS — Z98.84 STATUS POST LAPAROSCOPIC SLEEVE GASTRECTOMY: Primary | ICD-10-CM

## 2019-09-25 DIAGNOSIS — E66.01 MORBID OBESITY WITH BMI OF 45.0-49.9, ADULT (HCC): ICD-10-CM

## 2019-09-25 DIAGNOSIS — K91.2 POSTSURGICAL MALABSORPTION: ICD-10-CM

## 2019-09-25 DIAGNOSIS — Z13.21 SCREENING FOR MALNUTRITION: ICD-10-CM

## 2019-09-25 DIAGNOSIS — E78.5 HYPERLIPIDEMIA, UNSPECIFIED HYPERLIPIDEMIA TYPE: ICD-10-CM

## 2019-09-25 PROCEDURE — 99024 POSTOP FOLLOW-UP VISIT: CPT | Performed by: PHYSICIAN ASSISTANT

## 2019-09-25 RX ORDER — IBUPROFEN 200 MG
200 TABLET ORAL EVERY 6 HOURS PRN
COMMUNITY
End: 2020-06-16 | Stop reason: ALTCHOICE

## 2019-09-25 NOTE — PROGRESS NOTES
soft chew- recommend start 45-65 mg iron tablet daily     Does patient exercise:Patient is going to PT twice a week      Assessment  Pt tolerating stage appropriate foods without difficulty. Supplementing with protein shakes twice a day to meet protein goal of 60-80 grams daily. Water intake adequate. Plan  Plan/Recommendations: Pt educated on six week post op nutrition guidelines. Questions answered. See additional instructions below. -  Patient Instructions   1. Continue bariatric vitamins as you are currently taking. Add a 45-65 mg iron tablet - take once a day. Continue other vitamins as currently taking   2. Goal remains  60-80 grams protein per day. Focus on choosing protein foods first at meals. Suggest  continue one-two protein shake daily to meet this recommendation. 3. Increase physical activity to include cardiac and strength training now that you no longer have weight restrictions  4. Water goal is 64 oz per day and make sure no liquids 30 minutes before meals and no liquids 30 minutes after meals  5. Take 20-30 minutes to eat meals and chew all foods well for best tolerance especially as you introduce new foods. Arrive 10 minutes early next visit for SECA scale measurement and get lab work done 5-7 days before next office visit.           Recommended Follow-Up: three month post op with PA and CELINA Moss, RD, LD  Dietitian- [facility]

## 2019-09-27 ENCOUNTER — HOSPITAL ENCOUNTER (OUTPATIENT)
Dept: PHYSICAL THERAPY | Age: 49
Setting detail: THERAPIES SERIES
Discharge: HOME | End: 2019-09-27
Payer: MEDICAID

## 2019-09-27 PROCEDURE — 97035 APP MDLTY 1+ULTRASOUND EA 15: CPT

## 2019-09-27 PROCEDURE — 97110 THERAPEUTIC EXERCISES: CPT

## 2019-09-27 ASSESSMENT — PAIN SCALES - GENERAL: PAINLEVEL_OUTOF10: 5

## 2019-09-27 ASSESSMENT — PAIN DESCRIPTION - LOCATION: LOCATION: ANKLE

## 2019-09-27 ASSESSMENT — PAIN DESCRIPTION - ORIENTATION: ORIENTATION: RIGHT

## 2019-09-27 NOTE — PROGRESS NOTES
Assessment:  Assessment: Patient having more pain so held standing activity. Ultrasound completed to dorsal foot to decrease pain and inflammation in addition to massage. Pt able to tolerate unloaded ROM and tband exercises, and standing heel raises, but not toe raises. Pt to monitor symptoms and keep weight bearing exercises to a minimum until pain decreases. Prognosis: Good       Patient Education:  Patient Education: Continue massage and ice. Work on unloaded ROM, tband. Try standing pf stretch. Monitor pain. Plan:  Times per week: 2-3x  Times per day: Daily  Plan weeks: 8 weeks  Current Treatment Recommendations: Strengthening, ROM, Balance Training, Functional Mobility Training, Gait Training, Home Exercise Program, Manual Therapy - Soft Tissue Mobilization, Patient/Caregiver Education & Training, Modalities  Plan Comment: Continue with current POC. Goals:  Patient goals : Reduce right foot pain    Short term goals  Time Frame for Short term goals: 4 weeks  Short term goal 1: Previous goal met. Short term goal 2: Pt will improve AROM right ankle df from -12 to -6 deg, pf from 42 to 50 deg, inversion from 12 to 20 deg, and eversion from 1-2 deg to 8 degrees to assist with transfers and normalizing gait pattern. NOT MET- right ankle df -12 deg, pf 53 deg, inversion 20 deg, eversion 1 deg. Short term goal 3: Pt will improve right ankle strength from 2-/5 to 3/5 to tolerate FWB and weight shifting for gait. NOT MET- right ankle strength 2-/5 d/t lacking full ROM. Short term goal 4: Previous goal met. Long term goals  Time Frame for Long term goals : 8 weeks  Long term goal 1: Pt will improve AROM right ankle df to neutral, pf to 60 deg, inversion to 30 deg and eversion to 10 deg for ease squatting and walking. NOT MET. Long term goal 2: Pt will improve right ankle strength to 4-/5 to tolerate SLS x3 sec to prevent falls when walking on uneven surfaces. NOT MET.    Long term goal 3: Pt will ambulate without AD >50ft with minimal deviations/antalgia for household and short community mobility. NOT MET. Long term goal 4: Pt will be independent and compliant with HEP to achieve above goals. ONGOING-Pt verbalizes compliance with current HEP daily.      Kirill Slade, PT

## 2019-09-30 ENCOUNTER — HOSPITAL ENCOUNTER (OUTPATIENT)
Dept: PHYSICAL THERAPY | Age: 49
Setting detail: THERAPIES SERIES
Discharge: HOME | End: 2019-09-30
Payer: MEDICAID

## 2019-09-30 PROCEDURE — 97113 AQUATIC THERAPY/EXERCISES: CPT

## 2019-09-30 ASSESSMENT — PAIN SCALES - GENERAL: PAINLEVEL_OUTOF10: 4

## 2019-09-30 ASSESSMENT — PAIN DESCRIPTION - LOCATION: LOCATION: ANKLE

## 2019-10-04 ENCOUNTER — HOSPITAL ENCOUNTER (OUTPATIENT)
Dept: PHYSICAL THERAPY | Age: 49
Setting detail: THERAPIES SERIES
Discharge: HOME | End: 2019-10-04
Payer: MEDICAID

## 2019-10-04 PROCEDURE — 97110 THERAPEUTIC EXERCISES: CPT

## 2019-10-04 ASSESSMENT — PAIN DESCRIPTION - ORIENTATION: ORIENTATION: RIGHT

## 2019-10-04 ASSESSMENT — PAIN SCALES - GENERAL: PAINLEVEL_OUTOF10: 2

## 2019-10-04 ASSESSMENT — PAIN DESCRIPTION - LOCATION: LOCATION: ANKLE

## 2019-10-08 ENCOUNTER — HOSPITAL ENCOUNTER (OUTPATIENT)
Dept: PHYSICAL THERAPY | Age: 49
Setting detail: THERAPIES SERIES
Discharge: HOME | End: 2019-10-08
Payer: MEDICAID

## 2019-10-08 PROCEDURE — 97113 AQUATIC THERAPY/EXERCISES: CPT

## 2019-10-08 ASSESSMENT — PAIN SCALES - GENERAL: PAINLEVEL_OUTOF10: 4

## 2019-10-08 ASSESSMENT — PAIN DESCRIPTION - ORIENTATION: ORIENTATION: RIGHT

## 2019-10-08 ASSESSMENT — PAIN DESCRIPTION - LOCATION: LOCATION: ANKLE

## 2019-10-11 ENCOUNTER — HOSPITAL ENCOUNTER (OUTPATIENT)
Dept: PHYSICAL THERAPY | Age: 49
Setting detail: THERAPIES SERIES
Discharge: HOME | End: 2019-10-11
Payer: MEDICAID

## 2019-10-11 PROCEDURE — 97110 THERAPEUTIC EXERCISES: CPT

## 2019-10-11 PROCEDURE — 97035 APP MDLTY 1+ULTRASOUND EA 15: CPT

## 2019-10-11 ASSESSMENT — PAIN SCALES - GENERAL: PAINLEVEL_OUTOF10: 4

## 2019-10-11 ASSESSMENT — PAIN DESCRIPTION - LOCATION: LOCATION: ANKLE

## 2019-10-11 ASSESSMENT — PAIN DESCRIPTION - ORIENTATION: ORIENTATION: RIGHT

## 2019-10-14 ENCOUNTER — HOSPITAL ENCOUNTER (OUTPATIENT)
Dept: PHYSICAL THERAPY | Age: 49
Setting detail: THERAPIES SERIES
Discharge: HOME | End: 2019-10-14
Payer: MEDICAID

## 2019-10-14 PROCEDURE — 97113 AQUATIC THERAPY/EXERCISES: CPT

## 2019-10-14 ASSESSMENT — PAIN DESCRIPTION - LOCATION: LOCATION: FOOT

## 2019-10-14 ASSESSMENT — PAIN DESCRIPTION - ORIENTATION: ORIENTATION: RIGHT

## 2019-10-14 ASSESSMENT — PAIN SCALES - GENERAL: PAINLEVEL_OUTOF10: 2

## 2019-11-01 ENCOUNTER — APPOINTMENT (OUTPATIENT)
Dept: PHYSICAL THERAPY | Age: 49
End: 2019-11-01
Payer: MEDICAID

## 2019-11-04 RX ORDER — OMEPRAZOLE 40 MG/1
CAPSULE, DELAYED RELEASE ORAL
Qty: 30 CAPSULE | Refills: 2 | Status: SHIPPED | OUTPATIENT
Start: 2019-11-04 | End: 2020-02-13

## 2019-11-18 ENCOUNTER — APPOINTMENT (OUTPATIENT)
Dept: PHYSICAL THERAPY | Age: 49
End: 2019-11-18
Payer: MEDICAID

## 2019-11-18 LAB
A/G RATIO: 1.6 (ref 1.5–2.5)
ABSOLUTE BASO #: 100 /CMM (ref 0–200)
ABSOLUTE EOS #: 200 /CMM (ref 0–500)
ABSOLUTE LYMPH #: 2700 /CMM (ref 1000–4800)
ABSOLUTE MONO #: 500 /CMM (ref 0–800)
ABSOLUTE NEUT #: 2900 /CMM (ref 1800–7700)
ALBUMIN SERPL-MCNC: 4.2 GM/DL (ref 3.5–5)
ALP BLD-CCNC: 89 IU/L (ref 39–118)
ALT SERPL-CCNC: 20 IU/L (ref 10–40)
ANION GAP SERPL CALCULATED.3IONS-SCNC: 5 MMOL/L (ref 4–12)
AST SERPL-CCNC: 18 IU/L (ref 15–41)
BASOPHILS RELATIVE PERCENT: 1.1 % (ref 0–2)
BILIRUB SERPL-MCNC: 0.7 MG/DL (ref 0.2–1)
BUN BLDV-MCNC: 11 MG/DL (ref 7–20)
CALCIUM SERPL-MCNC: 9.3 MG/DL (ref 8.8–10.5)
CHLORIDE BLD-SCNC: 107 MEQ/L (ref 101–111)
CO2: 29 MEQ/L (ref 21–32)
CREAT SERPL-MCNC: 0.51 MG/DL (ref 0.6–1.3)
CREATININE CLEARANCE: >60
EOSINOPHILS RELATIVE PERCENT: 3.3 % (ref 0–6)
GLUCOSE: 103 MG/DL (ref 70–110)
HCT VFR BLD CALC: 39.9 % (ref 35–44)
HEMOGLOBIN: 13 GM/DL (ref 12–15)
LYMPHOCYTES RELATIVE PERCENT: 42.8 % (ref 15–45)
MCH RBC QN AUTO: 29 PG (ref 27.5–33)
MCHC RBC AUTO-ENTMCNC: 32.7 GM/DL (ref 33–36)
MCV RBC AUTO: 88.8 CU MIC (ref 80–97)
MONOCYTES RELATIVE PERCENT: 8.2 % (ref 2–10)
NEUTROPHILS RELATIVE PERCENT: 44.6 % (ref 40–70)
NUCLEATED RBCS: 0.1 /100 WBC
PDW BLD-RTO: 14.8 % (ref 12–16)
PLATELET # BLD: 295 TH/CMM (ref 150–400)
POTASSIUM SERPL-SCNC: 4.2 MEQ/L (ref 3.6–5)
PREALBUMIN: 21 MG/DL (ref 16–38)
RBC # BLD: 4.49 MIL/CMM (ref 4–5.1)
SODIUM BLD-SCNC: 141 MEQ/L (ref 135–145)
TOTAL PROTEIN: 6.9 G/DL (ref 6.2–8)
VITAMIN D 25-HYDROXY: 49 NG/ML (ref 30–100)
WBC # BLD: 6.4 TH/CMM (ref 4.4–10.5)

## 2019-11-20 ENCOUNTER — HOSPITAL ENCOUNTER (OUTPATIENT)
Dept: PHYSICAL THERAPY | Age: 49
Setting detail: THERAPIES SERIES
Discharge: HOME | End: 2019-11-20
Payer: MEDICAID

## 2019-11-20 PROCEDURE — 97035 APP MDLTY 1+ULTRASOUND EA 15: CPT

## 2019-11-20 PROCEDURE — 97530 THERAPEUTIC ACTIVITIES: CPT

## 2019-11-21 ENCOUNTER — OFFICE VISIT (OUTPATIENT)
Dept: BARIATRICS/WEIGHT MGMT | Age: 49
End: 2019-11-21
Payer: MEDICAID

## 2019-11-21 ENCOUNTER — OFFICE VISIT (OUTPATIENT)
Dept: BARIATRICS/WEIGHT MGMT | Age: 49
End: 2019-11-21

## 2019-11-21 VITALS
TEMPERATURE: 98 F | WEIGHT: 230.8 LBS | RESPIRATION RATE: 18 BRPM | BODY MASS INDEX: 45.31 KG/M2 | HEIGHT: 60 IN | DIASTOLIC BLOOD PRESSURE: 71 MMHG | HEART RATE: 66 BPM | SYSTOLIC BLOOD PRESSURE: 133 MMHG

## 2019-11-21 DIAGNOSIS — Z98.84 STATUS POST LAPAROSCOPIC SLEEVE GASTRECTOMY: Primary | ICD-10-CM

## 2019-11-21 DIAGNOSIS — Z13.21 SCREENING FOR MALNUTRITION: ICD-10-CM

## 2019-11-21 DIAGNOSIS — E78.5 HYPERLIPIDEMIA, UNSPECIFIED HYPERLIPIDEMIA TYPE: ICD-10-CM

## 2019-11-21 DIAGNOSIS — E66.01 MORBID OBESITY WITH BMI OF 45.0-49.9, ADULT (HCC): ICD-10-CM

## 2019-11-21 DIAGNOSIS — K91.2 POSTSURGICAL MALABSORPTION: ICD-10-CM

## 2019-11-21 PROBLEM — I10 ESSENTIAL HYPERTENSION: Status: ACTIVE | Noted: 2019-11-21

## 2019-11-21 PROBLEM — M54.50 LOW BACK PAIN: Status: ACTIVE | Noted: 2018-08-02

## 2019-11-21 LAB — THIAMINE BLOOD: 95 NMOL/L (ref 70–180)

## 2019-11-21 PROCEDURE — G8484 FLU IMMUNIZE NO ADMIN: HCPCS | Performed by: PHYSICIAN ASSISTANT

## 2019-11-21 PROCEDURE — 1036F TOBACCO NON-USER: CPT | Performed by: PHYSICIAN ASSISTANT

## 2019-11-21 PROCEDURE — G8427 DOCREV CUR MEDS BY ELIG CLIN: HCPCS | Performed by: PHYSICIAN ASSISTANT

## 2019-11-21 PROCEDURE — G8417 CALC BMI ABV UP PARAM F/U: HCPCS | Performed by: PHYSICIAN ASSISTANT

## 2019-11-21 PROCEDURE — 99213 OFFICE O/P EST LOW 20 MIN: CPT | Performed by: PHYSICIAN ASSISTANT

## 2019-11-21 RX ORDER — ACETAMINOPHEN 325 MG/1
650 TABLET ORAL EVERY 6 HOURS PRN
COMMUNITY
End: 2020-06-16 | Stop reason: ALTCHOICE

## 2019-11-22 ENCOUNTER — HOSPITAL ENCOUNTER (OUTPATIENT)
Dept: PHYSICAL THERAPY | Age: 49
Setting detail: THERAPIES SERIES
Discharge: HOME | End: 2019-11-22
Payer: MEDICAID

## 2019-11-22 PROCEDURE — 97113 AQUATIC THERAPY/EXERCISES: CPT

## 2019-11-22 ASSESSMENT — PAIN DESCRIPTION - ORIENTATION: ORIENTATION: RIGHT

## 2019-11-22 ASSESSMENT — PAIN DESCRIPTION - LOCATION: LOCATION: FOOT

## 2019-11-22 ASSESSMENT — PAIN DESCRIPTION - PAIN TYPE: TYPE: CHRONIC PAIN

## 2019-11-22 ASSESSMENT — PAIN SCALES - GENERAL: PAINLEVEL_OUTOF10: 6

## 2019-11-26 ENCOUNTER — HOSPITAL ENCOUNTER (OUTPATIENT)
Dept: PHYSICAL THERAPY | Age: 49
Setting detail: THERAPIES SERIES
Discharge: HOME | End: 2019-11-26
Payer: MEDICAID

## 2019-11-26 PROCEDURE — 97113 AQUATIC THERAPY/EXERCISES: CPT

## 2019-11-26 ASSESSMENT — PAIN DESCRIPTION - PAIN TYPE: TYPE: CHRONIC PAIN

## 2019-11-26 ASSESSMENT — PAIN SCALES - GENERAL: PAINLEVEL_OUTOF10: 6

## 2019-11-26 ASSESSMENT — PAIN DESCRIPTION - LOCATION: LOCATION: FOOT

## 2019-11-26 ASSESSMENT — PAIN DESCRIPTION - ORIENTATION: ORIENTATION: RIGHT

## 2019-11-27 ENCOUNTER — HOSPITAL ENCOUNTER (OUTPATIENT)
Dept: PHYSICAL THERAPY | Age: 49
Setting detail: THERAPIES SERIES
Discharge: HOME | End: 2019-11-27
Payer: MEDICAID

## 2019-11-27 PROCEDURE — 97113 AQUATIC THERAPY/EXERCISES: CPT

## 2019-11-27 ASSESSMENT — PAIN SCALES - GENERAL: PAINLEVEL_OUTOF10: 4

## 2019-11-27 ASSESSMENT — PAIN DESCRIPTION - LOCATION: LOCATION: FOOT

## 2019-11-27 ASSESSMENT — PAIN DESCRIPTION - ORIENTATION: ORIENTATION: RIGHT

## 2019-11-27 ASSESSMENT — PAIN DESCRIPTION - PAIN TYPE: TYPE: CHRONIC PAIN

## 2019-12-03 ENCOUNTER — HOSPITAL ENCOUNTER (OUTPATIENT)
Dept: PHYSICAL THERAPY | Age: 49
Setting detail: THERAPIES SERIES
Discharge: HOME | End: 2019-12-03
Payer: MEDICAID

## 2019-12-03 PROCEDURE — 97113 AQUATIC THERAPY/EXERCISES: CPT

## 2019-12-03 ASSESSMENT — PAIN DESCRIPTION - LOCATION: LOCATION: FOOT

## 2019-12-03 ASSESSMENT — PAIN DESCRIPTION - PAIN TYPE: TYPE: CHRONIC PAIN

## 2019-12-03 ASSESSMENT — PAIN DESCRIPTION - ORIENTATION: ORIENTATION: RIGHT

## 2019-12-03 ASSESSMENT — PAIN SCALES - GENERAL: PAINLEVEL_OUTOF10: 5

## 2019-12-05 ENCOUNTER — HOSPITAL ENCOUNTER (OUTPATIENT)
Dept: PHYSICAL THERAPY | Age: 49
Setting detail: THERAPIES SERIES
Discharge: HOME | End: 2019-12-05
Payer: MEDICAID

## 2019-12-05 PROCEDURE — 97113 AQUATIC THERAPY/EXERCISES: CPT

## 2019-12-05 ASSESSMENT — PAIN DESCRIPTION - ORIENTATION: ORIENTATION: RIGHT

## 2019-12-05 ASSESSMENT — PAIN DESCRIPTION - LOCATION: LOCATION: ANKLE

## 2019-12-05 ASSESSMENT — PAIN DESCRIPTION - PAIN TYPE: TYPE: CHRONIC PAIN

## 2019-12-05 ASSESSMENT — PAIN SCALES - GENERAL: PAINLEVEL_OUTOF10: 6

## 2019-12-09 ENCOUNTER — APPOINTMENT (OUTPATIENT)
Dept: PHYSICAL THERAPY | Age: 49
End: 2019-12-09
Payer: MEDICAID

## 2020-02-04 LAB
A/G RATIO: 1.8 (ref 1.5–2.5)
ABSOLUTE BASO #: 0 /CMM (ref 0–200)
ABSOLUTE EOS #: 100 /CMM (ref 0–500)
ABSOLUTE LYMPH #: 2100 /CMM (ref 1000–4800)
ABSOLUTE MONO #: 500 /CMM (ref 0–800)
ABSOLUTE NEUT #: 3400 /CMM (ref 1800–7700)
ALBUMIN SERPL-MCNC: 4.3 GM/DL (ref 3.5–5)
ALP BLD-CCNC: 85 IU/L (ref 39–118)
ALT SERPL-CCNC: 24 IU/L (ref 10–40)
ANION GAP SERPL CALCULATED.3IONS-SCNC: 7 MMOL/L (ref 4–12)
AST SERPL-CCNC: 22 IU/L (ref 15–41)
BASOPHILS RELATIVE PERCENT: 0.5 % (ref 0–2)
BILIRUB SERPL-MCNC: 0.7 MG/DL (ref 0.2–1)
BUN BLDV-MCNC: 13 MG/DL (ref 7–20)
CALCIUM SERPL-MCNC: 9.8 MG/DL (ref 8.8–10.5)
CHLORIDE BLD-SCNC: 105 MEQ/L (ref 101–111)
CO2: 27 MEQ/L (ref 21–32)
CREAT SERPL-MCNC: 0.72 MG/DL (ref 0.6–1.3)
CREATININE CLEARANCE: >60
EOSINOPHILS RELATIVE PERCENT: 1.6 % (ref 0–6)
FERRITIN: 183 NG/ML (ref 11–307)
GLUCOSE: 109 MG/DL (ref 70–110)
HCT VFR BLD CALC: 41.5 % (ref 35–44)
HEMOGLOBIN: 13.5 GM/DL (ref 12–15)
IRON SATURATION: 25 % (ref 15–50)
IRON, SERUM: 85 MCG/DL (ref 28–170)
LYMPHOCYTES RELATIVE PERCENT: 34.3 % (ref 15–45)
MCH RBC QN AUTO: 29.5 PG (ref 27.5–33)
MCHC RBC AUTO-ENTMCNC: 32.5 GM/DL (ref 33–36)
MCV RBC AUTO: 91 CU MIC (ref 80–97)
MONOCYTES RELATIVE PERCENT: 8.5 % (ref 2–10)
NEUTROPHILS RELATIVE PERCENT: 55.1 % (ref 40–70)
NUCLEATED RBCS: 0.1 /100 WBC
PARATHYROID HORMONE INTACT: 31 U/ML (ref 12–88)
PDW BLD-RTO: 14.1 % (ref 12–16)
PLATELET # BLD: 332 TH/CMM (ref 150–400)
POTASSIUM SERPL-SCNC: 4.9 MEQ/L (ref 3.6–5)
PREALBUMIN: 23 MG/DL (ref 16–38)
RBC # BLD: 4.56 MIL/CMM (ref 4–5.1)
SODIUM BLD-SCNC: 139 MEQ/L (ref 135–145)
TOTAL PROTEIN: 6.7 G/DL (ref 6.2–8)
TRANSFERRIN: 238 MG/DL (ref 192–382)
TSH REFLEX: 2.61 MCIU/ML (ref 0.49–4.67)
VITAMIN D 25-HYDROXY: 79.6 NG/ML (ref 30–100)
WBC # BLD: 6.2 TH/CMM (ref 4.4–10.5)

## 2020-02-05 LAB
ESTIMATED AVERAGE GLUCOSE: 126 MG/DL
HBA1C MFR BLD: 6 % (ref 4.4–6.4)

## 2020-02-07 LAB — THIAMINE BLOOD: 80 NMOL/L (ref 70–180)

## 2020-02-13 ENCOUNTER — OFFICE VISIT (OUTPATIENT)
Dept: BARIATRICS/WEIGHT MGMT | Age: 50
End: 2020-02-13
Payer: MEDICAID

## 2020-02-13 VITALS
SYSTOLIC BLOOD PRESSURE: 112 MMHG | BODY MASS INDEX: 38.48 KG/M2 | TEMPERATURE: 98.2 F | WEIGHT: 196 LBS | HEIGHT: 60 IN | DIASTOLIC BLOOD PRESSURE: 86 MMHG | HEART RATE: 72 BPM

## 2020-02-13 PROBLEM — G57.81: Status: ACTIVE | Noted: 2020-01-17

## 2020-02-13 PROBLEM — G57.30 SUPERFICIAL PERONEAL NERVE NEUROPATHY: Status: ACTIVE | Noted: 2020-01-17

## 2020-02-13 PROCEDURE — G8484 FLU IMMUNIZE NO ADMIN: HCPCS | Performed by: PHYSICIAN ASSISTANT

## 2020-02-13 PROCEDURE — 99213 OFFICE O/P EST LOW 20 MIN: CPT | Performed by: PHYSICIAN ASSISTANT

## 2020-02-13 PROCEDURE — G8417 CALC BMI ABV UP PARAM F/U: HCPCS | Performed by: PHYSICIAN ASSISTANT

## 2020-02-13 PROCEDURE — 1036F TOBACCO NON-USER: CPT | Performed by: PHYSICIAN ASSISTANT

## 2020-02-13 PROCEDURE — G8427 DOCREV CUR MEDS BY ELIG CLIN: HCPCS | Performed by: PHYSICIAN ASSISTANT

## 2020-02-13 NOTE — PROGRESS NOTES
[facility]  [address]  [place]      Visit Date:  2/13/2020  Weight Management Postop Follow-up    HPI:      Dayne Duverney is a 52 y.o. female who is here today for 6 month follow up since  robotic-assisted sleeve gastrectomy performed by Dr. Chey Hong  on 8/12/19. Doing great. Staying more active. Still recovering from foot surgery- continues to ambulate with cane. Has started going to the gym 3 times per week. Down 34# since last visit. Down 72# since surgery. BMI 38 . Drinking 80-90 oz of fluid and 70-90 grams of protein daily. Drinking 2 protein shakes daily. No carbonation. No sweets. Tolerating post-op diet. No problems with bowel movements. No nausea. No emesis. No GERD/ Reflux. Denies CP/SOB. No Dizziness. No abdominal pain. No incisional discomfort. No sx of dehydration. No fever or chills. Taking and tolerating all vitamins. 6 month labs reviewed. Seca scale completed and reviewed. Reports that she has had more irritation to excess skin on abdomen and thighs. Using baby powder. No rash or irritation currently. Advised to see PCP if rash returns and not resolved with OTC's. Will need to wait for at least another year prior to skin removal surgery. Physical Activity: anytime fitness 3 times per week    Current BMI: Body mass index is 38.28 kg/m².   Current Weight:   Wt Readings from Last 3 Encounters:   02/13/20 196 lb (88.9 kg)   11/21/19 230 lb 12.8 oz (104.7 kg)   09/25/19 248 lb (112.5 kg)     Pre-op Body [id number]      Past Medical History:  Past Medical History:   Diagnosis Date    Arthritis     Hyperlipidemia        Past Surgical History:  Past Surgical History:   Procedure Laterality Date    ANKLE SURGERY Left 2006    ANKLE SURGERY Right 03/2019    plate and screws    COLONOSCOPY      FRACTURE SURGERY      HYSTERECTOMY, TOTAL ABDOMINAL      HYSTEROPLASTY      SLEEVE GASTRECTOMY N/A 8/12/2019    ROBOTIC SLEEVE GASTRECTOMY performed by Thang Culver MD at [address] N/A 2/18/2019    EGD WITH BX performed by Petrona Castellano MD at [facility]    WRIST SURGERY Right 2014    plate and screws       Past Social History:  Social History     Socioeconomic History    Marital status:      Spouse name: Not on file    Number of children: Not on file    Years of education: Not on file    Highest education level: Not on file   Occupational History    Not on file   Social Needs    Financial resource strain: Not on file    Food insecurity:     Worry: Not on file     Inability: Not on file    Transportation needs:     Medical: Not on file     Non-medical: Not on file   Tobacco Use    Smoking status: Former Smoker     Packs/day: 1.00     Types: Cigarettes     Last attempt to quit: 2/18/2004     Years since quitting: 15.9    Smokeless tobacco: Never Used   Substance and Sexual Activity    Alcohol use: Yes     Comment: rare    Drug use: No    Sexual activity: Not on file   Lifestyle    Physical activity:     Days per week: Not on file     Minutes per session: Not on file    Stress: Not on file   Relationships    Social connections:     Talks on phone: Not on file     Gets together: Not on file     Attends Baptism service: Not on file     Active member of club or organization: Not on file     Attends meetings of clubs or organizations: Not on file     Relationship status: Not on file    Intimate partner violence:     Fear of current or ex partner: Not on file     Emotionally abused: Not on file     Physically abused: Not on file     Forced sexual activity: Not on file   Other Topics Concern    Not on file   Social History Narrative    Not on file        Medications:   Current Outpatient Medications   Medication Sig Dispense Refill    acetaminophen (TYLENOL) 325 MG tablet Take 650 mg by mouth every 6 hours as needed for Pain      Biotin 5000 MCG CAPS Take 1 capsule by mouth daily      Cyanocobalamin (VITAMIN B-12) 2500 MCG SUBL Place 2,500 mcg under the tongue daily      Calcium Citrate-Vitamin D (CELEBRATE CALCIUM CITRATE PO) Take by mouth 2 times daily powder      Multiple Vitamins-Minerals (MULTIVITAMIN ADULT PO) Take by mouth daily Liquid      Iron Carbonyl-Vitamin C-FOS (CHEWABLE IRON PO) Take by mouth daily      Cholecalciferol (VITAMIN D3) 2000 units TABS Take by mouth daily      atorvastatin (LIPITOR) 40 MG tablet Take 40 mg by mouth nightly       ibuprofen (ADVIL;MOTRIN) 200 MG tablet Take 200 mg by mouth every 6 hours as needed for Pain       No current facility-administered medications for this visit. Allergies: Allergies   Allergen Reactions    Celebrex [Celecoxib] Other (See Comments)     Patient states double vision    Toradol [Ketorolac Tromethamine]      Not sure of reaction    Morphine Rash       Subjective:    Review of Systems:  Constitutional: Denies any fever, chills, fatigue. Wound: (+) rash, skin color changes or wound problems. Resp: Denies any cough, shortness of breath. CV: Denies any chest pain, orthopnea or syncope. MS: Denies myalgias, (+) arthralgias. GI: Denies any nausea, vomiting, diarrhea, constipation, melena, hematochezia. No incisional discomfort. : Denies any hematuria, hesitancy or dysuria. NEURO: Denies seizures, headache. Objective:    /86 (Site: Right Upper Arm, Position: Sitting, Cuff Size: Large Adult)   Pulse 72   Temp 98.2 °F (36.8 °C) (Oral)   Ht 5' (1.524 m)   Wt 196 lb (88.9 kg)   BMI 38.28 kg/m²     Physical Examination:   Constitutional: Alert and oriented to person, place and time. Well-developed, well- nourished. Ambulates with cane. Head: Normocephalic and atraumatic  Neck: Supple. Eyes: EOMI b/l. Conjunctivae normal.  No scleral icterus. Respiratory: Effort normal. No respiratory distress. Abd: Benign  Ext:  Movement x 4. No edema  Skin;  Warm and dry, no visible rashes,

## 2020-02-25 ENCOUNTER — HOSPITAL ENCOUNTER (OUTPATIENT)
Dept: GENERAL RADIOLOGY | Age: 50
Discharge: HOME | End: 2020-02-25
Payer: MEDICAID

## 2020-02-25 ENCOUNTER — HOSPITAL ENCOUNTER (OUTPATIENT)
Age: 50
Discharge: HOME | End: 2020-02-25
Payer: MEDICAID

## 2020-02-25 PROCEDURE — 71046 X-RAY EXAM CHEST 2 VIEWS: CPT

## 2020-06-16 ENCOUNTER — HOSPITAL ENCOUNTER (EMERGENCY)
Age: 50
Discharge: HOME | End: 2020-06-16
Payer: MEDICAID

## 2020-06-16 VITALS
SYSTOLIC BLOOD PRESSURE: 129 MMHG | BODY MASS INDEX: 30.24 KG/M2 | HEIGHT: 59 IN | WEIGHT: 150 LBS | OXYGEN SATURATION: 99 % | TEMPERATURE: 98.6 F | RESPIRATION RATE: 20 BRPM | DIASTOLIC BLOOD PRESSURE: 60 MMHG | HEART RATE: 54 BPM

## 2020-06-16 PROCEDURE — 99212 OFFICE O/P EST SF 10 MIN: CPT

## 2020-06-16 PROCEDURE — 99202 OFFICE O/P NEW SF 15 MIN: CPT | Performed by: NURSE PRACTITIONER

## 2020-06-16 RX ORDER — CEPHALEXIN 500 MG/1
500 CAPSULE ORAL 4 TIMES DAILY
Qty: 40 CAPSULE | Refills: 0 | Status: SHIPPED | OUTPATIENT
Start: 2020-06-16 | End: 2020-06-26

## 2020-06-16 ASSESSMENT — PAIN - FUNCTIONAL ASSESSMENT: PAIN_FUNCTIONAL_ASSESSMENT: PREVENTS OR INTERFERES SOME ACTIVE ACTIVITIES AND ADLS

## 2020-06-16 ASSESSMENT — PAIN DESCRIPTION - PROGRESSION: CLINICAL_PROGRESSION: GRADUALLY WORSENING

## 2020-06-16 ASSESSMENT — ENCOUNTER SYMPTOMS
SHORTNESS OF BREATH: 0
ABDOMINAL PAIN: 0
DIARRHEA: 0
COUGH: 0
VOMITING: 0
NAUSEA: 0

## 2020-06-16 ASSESSMENT — PAIN DESCRIPTION - ORIENTATION: ORIENTATION: LEFT

## 2020-06-16 ASSESSMENT — PAIN DESCRIPTION - PAIN TYPE: TYPE: ACUTE PAIN

## 2020-06-16 ASSESSMENT — PAIN DESCRIPTION - FREQUENCY: FREQUENCY: CONTINUOUS

## 2020-06-16 ASSESSMENT — PAIN DESCRIPTION - DESCRIPTORS: DESCRIPTORS: ACHING

## 2020-06-16 ASSESSMENT — PAIN SCALES - GENERAL: PAINLEVEL_OUTOF10: 10

## 2020-06-16 ASSESSMENT — PAIN DESCRIPTION - LOCATION: LOCATION: FACE

## 2020-06-16 NOTE — ED PROVIDER NOTES
Citrate-Vitamin D (CELEBRATE CALCIUM CITRATE PO) Take by mouth 2 times daily powderHistorical Med      Multiple Vitamins-Minerals (MULTIVITAMIN ADULT PO) Take by mouth daily LiquidHistorical Med      Iron Carbonyl-Vitamin C-FOS (CHEWABLE IRON PO) Take by mouth dailyHistorical Med      Cholecalciferol (VITAMIN D3) 2000 units TABS Take by mouth dailyHistorical Med             ALLERGIES     Patient is is allergic to celebrex [celecoxib]; toradol [ketorolac tromethamine]; and morphine. FAMILY HISTORY     Patient'sfamily history includes Arthritis in her father and mother; Cancer in her paternal uncle; Dementia in her father; Diabetes in her father; High Blood Pressure in her father and mother; Parkinsonism in her father. SOCIAL HISTORY     Patient  reports that she quit smoking about 16 years ago. Her smoking use included cigarettes. She smoked 1.00 pack per day. She has never used smokeless tobacco. She reports current alcohol use. She reports that she does not use drugs. PHYSICAL EXAM     ED TRIAGE VITALS  BP: 129/60, Temp: 98.6 °F (37 °C), Pulse: 54, Resp: 20, SpO2: 99 %  Physical Exam  Vitals signs and nursing note reviewed. Constitutional:       General: She is not in acute distress. Appearance: Normal appearance. She is well-developed and well-groomed. She is not toxic-appearing. HENT:      Head: Normocephalic and atraumatic. Right Ear: External ear normal.      Left Ear: External ear normal.      Nose: Nose normal.      Mouth/Throat:      Lips: Pink. Mouth: Mucous membranes are moist.      Pharynx: Oropharynx is clear. Eyes:      Conjunctiva/sclera: Conjunctivae normal.      Right eye: Right conjunctiva is not injected. Left eye: Left conjunctiva is not injected. Pupils: Pupils are equal.   Neck:      Musculoskeletal: Normal range of motion. Cardiovascular:      Rate and Rhythm: Normal rate. Heart sounds: Normal heart sounds.    Pulmonary:      Effort: Pulmonary effort is normal.      Breath sounds: Normal breath sounds. Lymphadenopathy:      Cervical: No cervical adenopathy. Skin:     General: Skin is warm and dry. Findings: No rash (on exposed surfaces). Neurological:      Mental Status: She is alert and oriented to person, place, and time. Psychiatric:         Speech: Speech normal.         Behavior: Behavior is cooperative. DIAGNOSTIC RESULTS   Labs:No results found for this visit on 06/16/20. IMAGING:  No orders to display     URGENT CARE COURSE:     Vitals:    06/16/20 1153 06/16/20 1154   BP:  129/60   Pulse: 54    Resp: 20    Temp: 98.6 °F (37 °C)    TempSrc: Temporal    SpO2: 99%    Weight: 150 lb (68 kg)    Height: 4' 11\" (1.499 m)        Medications - No data to display  PROCEDURES:  FINALIMPRESSION      1. Facial abscess    2. Facial cellulitis        DISPOSITION/PLAN   DISPOSITION Decision To Discharge 06/16/2020 12:09:32 PM  Physical assessment findings, diagnostic testing(s) if applicable, and vital signs reviewed with patient/patient representative. Questions answered. If applicable, patient/patient representative will be contacted upon receipt of final culture and sensitivity or other testing results when available. Any additions or changes to medications or changes the plan of care will be made at that time. Medications as directed, including OTC medications for supportive care. Education provided on medications. Differential diagnosis(s) discussed with patient/patient representative. Home care/self care instructions reviewed with patient/patient representative. Patient is to follow-up with family care provider in 2-3 days if no improvement. Patient is to go to the emergency department if symptoms worsen. Patient/patient representative is aware of care plan, questions answered, verbalizes understanding and is in agreement.   Teach back method used for patient/patient representative teaching(s) and printed instructions

## 2020-08-11 ENCOUNTER — APPOINTMENT (OUTPATIENT)
Dept: GENERAL RADIOLOGY | Age: 50
End: 2020-08-11
Payer: MEDICAID

## 2020-08-11 ENCOUNTER — HOSPITAL ENCOUNTER (EMERGENCY)
Age: 50
Discharge: HOME | End: 2020-08-11
Payer: MEDICAID

## 2020-08-11 VITALS
HEART RATE: 56 BPM | SYSTOLIC BLOOD PRESSURE: 122 MMHG | WEIGHT: 154 LBS | DIASTOLIC BLOOD PRESSURE: 77 MMHG | OXYGEN SATURATION: 96 % | HEIGHT: 59 IN | TEMPERATURE: 98.2 F | BODY MASS INDEX: 31.04 KG/M2 | RESPIRATION RATE: 16 BRPM

## 2020-08-11 PROCEDURE — 99213 OFFICE O/P EST LOW 20 MIN: CPT

## 2020-08-11 PROCEDURE — 73130 X-RAY EXAM OF HAND: CPT

## 2020-08-11 PROCEDURE — 99213 OFFICE O/P EST LOW 20 MIN: CPT | Performed by: NURSE PRACTITIONER

## 2020-08-11 ASSESSMENT — ENCOUNTER SYMPTOMS
NAUSEA: 0
VOMITING: 0
SHORTNESS OF BREATH: 0
COLOR CHANGE: 1
TROUBLE SWALLOWING: 0

## 2020-08-11 ASSESSMENT — PAIN DESCRIPTION - FREQUENCY: FREQUENCY: CONTINUOUS

## 2020-08-11 ASSESSMENT — PAIN DESCRIPTION - LOCATION: LOCATION: HAND

## 2020-08-11 ASSESSMENT — PAIN DESCRIPTION - PAIN TYPE: TYPE: ACUTE PAIN

## 2020-08-11 ASSESSMENT — PAIN DESCRIPTION - ONSET: ONSET: SUDDEN

## 2020-08-11 ASSESSMENT — PAIN - FUNCTIONAL ASSESSMENT: PAIN_FUNCTIONAL_ASSESSMENT: PREVENTS OR INTERFERES SOME ACTIVE ACTIVITIES AND ADLS

## 2020-08-11 ASSESSMENT — PAIN SCALES - GENERAL: PAINLEVEL_OUTOF10: 6

## 2020-08-11 ASSESSMENT — PAIN DESCRIPTION - PROGRESSION: CLINICAL_PROGRESSION: NOT CHANGED

## 2020-08-11 ASSESSMENT — PAIN DESCRIPTION - DESCRIPTORS: DESCRIPTORS: ACHING;SORE

## 2020-08-11 ASSESSMENT — PAIN DESCRIPTION - ORIENTATION: ORIENTATION: RIGHT

## 2020-08-11 NOTE — ED PROVIDER NOTES
[facility] Encounter      [address] [facility]       Chief Complaint   Patient presents with    Hand Pain     Right       Nurses Notes reviewed and I agree except as noted in the HPI. HISTORY OF PRESENT ILLNESS   Beth Pickett is a 52 y.o. female who presents with complaints of right hand pain/injury. Injury 1 week ago after patient tripped at home. Patient tripped over a hospital bed, landing on right hand. She complains of aching, continuous pain. Rates 6/10. No numbness/tingling. Patient is right-handed. Patient still often performs repetitive motion with right hand. Patient is a full-time caregiver for parent. No improvement with rest, ice, and over-the-counter medicine. Requesting x-ray. REVIEW OF SYSTEMS     Review of Systems   Constitutional: Negative for chills, diaphoresis, fatigue and fever. HENT: Negative for trouble swallowing. Eyes: Negative for visual disturbance. Respiratory: Negative for shortness of breath. Cardiovascular: Negative for chest pain and palpitations. Gastrointestinal: Negative for nausea and vomiting. Musculoskeletal: Positive for arthralgias and joint swelling. Skin: Positive for color change. Negative for pallor, rash and wound. Neurological: Negative for weakness and numbness. Hematological: Does not bruise/bleed easily. Psychiatric/Behavioral: Negative for sleep disturbance. PAST MEDICAL HISTORY         Diagnosis Date    Arthritis     Hyperlipidemia        SURGICAL HISTORY     Patient  has a past surgical history that includes fracture surgery; Hysteroplasty; Wrist surgery (Right, 2014); Hysterectomy, total abdominal; Ankle surgery (Left, 2006); Upper gastrointestinal endoscopy (N/A, 2/18/2019); Colonoscopy; Ankle surgery (Right, 03/2019); and Sleeve Gastrectomy (N/A, 8/12/2019).     CURRENT MEDICATIONS       Previous Medications    BIOTIN 5000 MCG CAPS    Take 1 capsule by mouth daily       ALLERGIES Patient is is allergic to celebrex [celecoxib]; toradol [ketorolac tromethamine]; and morphine. FAMILY HISTORY     Patient'sfamily history includes Arthritis in her father and mother; Cancer in her paternal uncle; Dementia in her father; Diabetes in her father; High Blood Pressure in her father and mother; Parkinsonism in her father. SOCIAL HISTORY     Patient  reports that she quit smoking about 16 years ago. Her smoking use included cigarettes. She smoked 1.00 pack per day. She has never used smokeless tobacco. She reports current alcohol use. She reports that she does not use drugs. PHYSICAL EXAM     ED TRIAGE VITALS  BP: 122/77, Temp: 98.2 °F (36.8 °C), Pulse: 56, Resp: 16, SpO2: 96 %  Physical Exam  Vitals signs and nursing note reviewed. Constitutional:       General: She is not in acute distress. Appearance: Normal appearance. She is well-developed. She is not ill-appearing, toxic-appearing or diaphoretic. HENT:      Head: Normocephalic and atraumatic. Eyes:      General: No scleral icterus. Conjunctiva/sclera: Conjunctivae normal.   Cardiovascular:      Pulses:           Radial pulses are 2+ on the right side and 2+ on the left side. Pulmonary:      Effort: No respiratory distress. Musculoskeletal:      Right wrist: Normal.      Left wrist: Normal.      Right hand: She exhibits decreased range of motion, tenderness, bony tenderness and swelling. She exhibits normal two-point discrimination and no deformity. Normal sensation noted. Decreased strength (due to pain) noted. Left hand: Normal.        Hands:    Skin:     General: Skin is warm and dry. Capillary Refill: Capillary refill takes less than 2 seconds. Findings: Bruising (minimal, right dorsal hand) present. Neurological:      Mental Status: She is alert and oriented to person, place, and time. Sensory: Sensation is intact.    Psychiatric:         Mood and Affect: Mood normal.         Behavior: Behavior is cooperative. DIAGNOSTIC RESULTS   Labs: No results found for this visit on 08/11/20. IMAGING:  XR HAND RIGHT (MIN 3 VIEWS)   Final Result   Questionable nondisplaced fracture at the distal aspect of the proximal phalanx of the fifth digit. Please correlate clinically for point tenderness in this region. **This report has been created using voice recognition software. It may contain minor errors which are inherent in voice recognition technology. **      Final report electronically signed by Dr Myrtle Juares on 8/11/2020 11:10 AM        URGENT CARE COURSE:     Vitals:    08/11/20 1039   BP: 122/77   Pulse: 56   Resp: 16   Temp: 98.2 °F (36.8 °C)   TempSrc: Temporal   SpO2: 96%   Weight: 154 lb (69.9 kg)   Height: 4' 11\" (1.499 m)       Medications - No data to display  PROCEDURES:  None  FINALIMPRESSION      1. Closed nondisplaced fracture of proximal phalanx of right little finger, initial encounter    2. Fall, initial encounter        DISPOSITION/PLAN   DISPOSITION Decision To Discharge 08/11/2020 11:25:23 AM    Correlated for point tenderness of the proximal phalanx. Suresh tape applied, follow-up with [facility] today. Hand x-ray read as negative other than the phalanx. There appears to be a questionable fracture at the base of the proximal fifth metacarpal.   Discussed with Dr. Teja Urena. Patient most tender at that area. PATIENT REFERRED TO:  Y[address] Val[address]  [address] [facility] [phone]    Follow up at [facility] walk in.  Suresh tape until appt. If worse go to ER.     DISCHARGE MEDICATIONS:  New Prescriptions    No medications on file     Current Discharge Medication List          [address], APRN - CNP  08/11/20 [id number]

## 2020-08-11 NOTE — ED NOTES
Right hand, forth and fifth digits modesto taped. Patient tolerated well. Will follow up with [facility]. Discharge instructions reviewed. Patient verbalized understanding.       [address] [facility], RN  08/11/20 [id number]

## 2020-08-11 NOTE — ED TRIAGE NOTES
Pt to [facility] ambulatory with right hand pain. Pt stated she tripped and fell 1 week ago and landed on right hand. Right hand is swollen and bruised. Pt has applied ice and elevation to right hand.

## 2020-08-28 LAB
A/G RATIO: 1.5 (ref 1.5–2.5)
ABSOLUTE BASO #: 100 /CMM (ref 0–200)
ABSOLUTE EOS #: 100 /CMM (ref 0–500)
ABSOLUTE LYMPH #: 2800 /CMM (ref 1000–4800)
ABSOLUTE MONO #: 400 /CMM (ref 0–800)
ABSOLUTE NEUT #: 3200 /CMM (ref 1800–7700)
ALBUMIN SERPL-MCNC: 4.2 GM/DL (ref 3.5–5)
ALP BLD-CCNC: 71 IU/L (ref 39–118)
ALT SERPL-CCNC: 17 IU/L (ref 10–40)
ANION GAP SERPL CALCULATED.3IONS-SCNC: 8 MMOL/L (ref 4–12)
AST SERPL-CCNC: 16 IU/L (ref 15–41)
BASOPHILS RELATIVE PERCENT: 0.9 % (ref 0–2)
BILIRUB SERPL-MCNC: 0.6 MG/DL (ref 0.2–1)
BUN BLDV-MCNC: 18 MG/DL (ref 7–20)
CALCIUM SERPL-MCNC: 9.2 MG/DL (ref 8.8–10.5)
CHLORIDE BLD-SCNC: 104 MEQ/L (ref 101–111)
CHOLESTEROL/HDL RELATIVE RISK: 4.6 (ref 4–4.4)
CHOLESTEROL: 262 MG/DL
CO2: 28 MEQ/L (ref 21–32)
CREAT SERPL-MCNC: 0.62 MG/DL (ref 0.6–1.3)
CREATININE CLEARANCE: >60
DIRECT-LDL / HDL RISK: 3.2
EOSINOPHILS RELATIVE PERCENT: 1.3 % (ref 0–6)
FERRITIN: 134 NG/ML (ref 11–307)
FOLATE: 12.3 NG/ML
GLUCOSE: 88 MG/DL (ref 70–110)
HCT VFR BLD CALC: 39.2 % (ref 35–44)
HDLC SERPL-MCNC: 56 MG/DL
HEMOGLOBIN: 13.1 GM/DL (ref 12–15)
IRON SATURATION: 23 % (ref 15–50)
IRON, SERUM: 99 MCG/DL (ref 28–170)
LDL CHOLESTEROL DIRECT: 182 MG/DL
LYMPHOCYTES RELATIVE PERCENT: 42.6 % (ref 15–45)
MCH RBC QN AUTO: 29.5 PG (ref 27.5–33)
MCHC RBC AUTO-ENTMCNC: 33.5 GM/DL (ref 33–36)
MCV RBC AUTO: 88.2 CU MIC (ref 80–97)
MONOCYTES RELATIVE PERCENT: 6.1 % (ref 2–10)
NEUTROPHILS RELATIVE PERCENT: 49.1 % (ref 40–70)
NUCLEATED RBCS: 0.1 /100 WBC
PARATHYROID HORMONE INTACT: 46.2 U/ML (ref 12–88)
PDW BLD-RTO: 14 % (ref 12–16)
PLATELET # BLD: 354 TH/CMM (ref 150–400)
POTASSIUM SERPL-SCNC: 4.2 MEQ/L (ref 3.6–5)
PREALBUMIN: 27 MG/DL (ref 16–38)
RBC # BLD: 4.45 MIL/CMM (ref 4–5.1)
SODIUM BLD-SCNC: 140 MEQ/L (ref 135–145)
TOTAL PROTEIN: 7 G/DL (ref 6.2–8)
TRANSFERRIN: 297 MG/DL (ref 192–382)
TRIGL SERPL-MCNC: 140 MG/DL
TSH REFLEX: 3.34 MCIU/ML (ref 0.49–4.67)
VITAMIN B-12: 532 PG/ML (ref 180–914)
VITAMIN D 25-HYDROXY: 47.8 NG/ML (ref 30–100)
VLDLC SERPL CALC-MCNC: 28 MG/DL
WBC # BLD: 6.6 TH/CMM (ref 4.4–10.5)

## 2020-08-30 LAB
ESTIMATED AVERAGE GLUCOSE: 120 MG/DL
HBA1C MFR BLD: 5.8 % (ref 4.4–6.4)

## 2020-08-31 ENCOUNTER — OFFICE VISIT (OUTPATIENT)
Dept: BARIATRICS/WEIGHT MGMT | Age: 50
End: 2020-08-31
Payer: MEDICAID

## 2020-08-31 ENCOUNTER — OFFICE VISIT (OUTPATIENT)
Dept: BARIATRICS/WEIGHT MGMT | Age: 50
End: 2020-08-31

## 2020-08-31 VITALS
WEIGHT: 161 LBS | TEMPERATURE: 97.9 F | HEART RATE: 88 BPM | HEIGHT: 60 IN | BODY MASS INDEX: 31.61 KG/M2 | SYSTOLIC BLOOD PRESSURE: 102 MMHG | DIASTOLIC BLOOD PRESSURE: 68 MMHG

## 2020-08-31 VITALS — TEMPERATURE: 97.9 F | BODY MASS INDEX: 31.77 KG/M2 | WEIGHT: 161.8 LBS | HEIGHT: 60 IN

## 2020-08-31 PROCEDURE — 1036F TOBACCO NON-USER: CPT | Performed by: PHYSICIAN ASSISTANT

## 2020-08-31 PROCEDURE — G8417 CALC BMI ABV UP PARAM F/U: HCPCS | Performed by: PHYSICIAN ASSISTANT

## 2020-08-31 PROCEDURE — G8427 DOCREV CUR MEDS BY ELIG CLIN: HCPCS | Performed by: PHYSICIAN ASSISTANT

## 2020-08-31 PROCEDURE — 99213 OFFICE O/P EST LOW 20 MIN: CPT | Performed by: PHYSICIAN ASSISTANT

## 2020-08-31 RX ORDER — ACYCLOVIR 800 MG/1
800 TABLET ORAL 2 TIMES DAILY
COMMUNITY
End: 2020-11-29

## 2020-08-31 RX ORDER — TRAMADOL HYDROCHLORIDE 50 MG/1
TABLET ORAL
Status: ON HOLD | COMMUNITY
Start: 2020-08-21 | End: 2020-11-09 | Stop reason: ALTCHOICE

## 2020-08-31 RX ORDER — M-VIT,TX,IRON,MINS/CALC/FOLIC 27MG-0.4MG
1 TABLET ORAL DAILY
COMMUNITY

## 2020-08-31 RX ORDER — NYSTATIN 100000 U/G
CREAM TOPICAL
Status: ON HOLD | COMMUNITY
Start: 2020-08-28 | End: 2020-11-09 | Stop reason: ALTCHOICE

## 2020-08-31 RX ORDER — IBUPROFEN 200 MG
800 TABLET ORAL EVERY 8 HOURS PRN
COMMUNITY
End: 2021-03-04

## 2020-08-31 NOTE — PATIENT INSTRUCTIONS
Goals: 1. Protein goal is 60-80 grams protein per day. Remember to choose protein foods first at meals to meet this goal, followed by vegetables, then fruit, and starch food last if still hungry. 2.  Water goal is 64 oz per day. Separate liquids from solids. No liquids 30 minutes before meals and no liquids 30 minutes after your meals. 3.  Take 20-30 minutes to eat - this helps with mindful eating as well. Do not skip meals and stick to consistent meal times schedule as much as possible. 4.  Physical activity remains important to maintain weight loss efforts  5. Routine vitamin intake is important to avoid deficiencies. Continue the following:   Centrum Multivitamin 1-2 per day. Add Calcium 500-600 mg for bone health. 6.  Follow up with family doctor about elevated cholesterol levels.

## 2020-08-31 NOTE — PROGRESS NOTES
[facility]  [address], [address]  [place]iz[place]      Visit Date:  8/31/2020  Weight Management Postop Follow-up    HPI:      Edwin Moses is a 52 y.o. female who is here today for 1 year follow up since  robotic-assisted sleeve gastrectomy performed by Dr. Zulma Jackson  on 8/12/19. Continues to do very well. Good energy. Very happy with results since surgery- currently a size 6- was size 24 prior to surgery. Activity level has been less lately due to fx right hand- currently in a cast. She is also in a walking boot as she currently has a right ankle sprain. Her dad has moved back in and she is caring for him. More stress lately- has started following with a counselor. Has been doing her best to stay on track with nutrition. Down 34# since last visit. Down 106# since surgery. BMI 31 . Drinking plenty of fluids. Getting in plenty of protein. Drinking 1 protein shakes daily. No carbonation. Avoiding sweets. Tolerating post-op diet. No problems with bowel movements. No nausea. No emesis. No GERD/ Reflux. Denies CP/SOB. No Dizziness. No abdominal pain. Taking and tolerating all vitamins. 1 year labs reviewed with patient. Cholesterol panel elevated- advised to discuss with PCP. Notes that she has continued to have rash/ irritation to excess skin to abdomin/ arms and thighs. Has been following with PCP. Has been using Nystatin powder and cream. Denies rash/irriation today. Requesting referral to Dr. Valarie Nolasco. Physical Activity: none currently-plans to get back to [employer] as soon as released. Current BMI: Body mass index is 31.44 kg/m².   Current Weight:   Wt Readings from Last 3 Encounters:   08/31/20 161 lb (73 kg)   08/31/20 161 lb 12.8 oz (73.4 kg)   08/11/20 154 lb (69.9 kg)     Pre-op Body TTEYED:750      Past Medical History:  Past Medical History:   Diagnosis Date    Arthritis     Hyperlipidemia        Past Surgical History:  Past Social History Narrative    Not on file        Medications:   Current Outpatient Medications   Medication Sig Dispense Refill    Multiple Vitamins-Minerals (THERAPEUTIC MULTIVITAMIN-MINERALS) tablet Take 1 tablet by mouth daily      acyclovir (ZOVIRAX) 800 MG tablet Take 800 mg by mouth 2 times daily      nystatin (MYCOSTATIN) 577978 UNIT/GM cream       traMADol (ULTRAM) 50 MG tablet TAKE 1 TABLET BY MOUTH EVERY 4 TO 6 HOURS AS NEEDED FOR FOOT PAIN      ibuprofen (ADVIL;MOTRIN) 200 MG tablet Take by mouth      Biotin 5000 MCG CAPS Take 1 capsule by mouth daily       No current facility-administered medications for this visit. Allergies: Allergies   Allergen Reactions    Celebrex [Celecoxib] Other (See Comments)     Patient states double vision    Toradol [Ketorolac Tromethamine]      Not sure of reaction    Morphine Rash       Subjective:    Review of Systems:  Constitutional: Denies any fever, chills, fatigue. Wound: (+) rash, skin color changes or wound problems. Resp: Denies any cough, shortness of breath. CV: Denies any chest pain, orthopnea or syncope. MS: Denies myalgias, (+)arthralgias. GI: Denies any nausea, vomiting, diarrhea, constipation, melena, hematochezia. No incisional discomfort. : Denies any hematuria, hesitancy or dysuria. NEURO: Denies seizures, headache. Objective:    /68 (Site: Left Upper Arm, Position: Sitting, Cuff Size: Large Adult)   Pulse 88   Temp 97.9 °F (36.6 °C) (Infrared)   Ht 5' (1.524 m)   Wt 161 lb (73 kg)   BMI 31.44 kg/m²   Physical Examination:   Constitutional: Alert and oriented to person, place and time. Well-developed, well- nourished. Head: Normocephalic and atraumatic  Neck: Supple. Eyes: EOMI b/l. Conjunctivae normal.  No scleral icterus. Respiratory: Effort normal. No respiratory distress. Abd: Moderate amount of excess skin. No current rash or irritation. Ext:  Cast right arm/ walking boot right LE.  Significant excess skin to B/L UE-No rash/irriation. Skin; Warm and dry, no visible rashes, lesions or ulcers.    Neuro: Cranial Nerves Grossly Intact; nml coordination        Labs:  CBC   Lab Results   Component Value Date    WBC 6.6 08/28/2020    RBC 4.45 08/28/2020    HGB 13.1 08/28/2020    HCT 39.2 08/28/2020    MCV 88.2 08/28/2020    MCH 29.5 08/28/2020    MCHC 33.5 08/28/2020    RDW 14.0 08/28/2020     08/28/2020    MPV 9.1 11/19/2018    SEGSPCT 56.6 11/19/2018    LABLYMP 34.9 11/19/2018    MONOPCT 6.1 08/28/2020    LABEOS 1.7 11/19/2018    BASO 0.4 11/19/2018    NRBC 0.1 08/28/2020    NRBC 0 11/19/2018    SEGSABS 4.4 11/19/2018    LYMPHSABS 2800 08/28/2020    LYMPHSABS 2.7 11/19/2018    MONOSABS 400 08/28/2020    MONOSABS 0.5 11/19/2018    EOSABS 100 08/28/2020    EOSABS 0.1 11/19/2018    BASOSABS 100 08/28/2020    BASOSABS 0.0 11/19/2018        BMP/CMP   Lab Results   Component Value Date    GLUCOSE 88 08/28/2020    CREATININE 0.62 08/28/2020    BUN 18 08/28/2020     08/28/2020    K 4.2 08/28/2020    K 4.0 08/13/2019     08/28/2020    CO2 28 08/28/2020    CALCIUM 9.20 08/28/2020    AST 16 08/28/2020    ALKPHOS 71 08/28/2020    PROT 7.0 08/28/2020    LABALBU 4.2 08/28/2020    BILITOT 0.6 08/28/2020    ALT 17 08/28/2020        PREALBUMIN   Lab Results   Component Value Date    PREALBUMIN 27.0 08/28/2020        VITAMIN B12   Lab Results   Component Value Date    BIMOMUWV58 532 08/28/2020        24 HOUR URINE CALCIUM   No results found for: Brayden Mullen CALCIUMUR     VITAMIN D   Lab Results   Component Value Date    VITD25 47.80 08/28/2020        VITAMIN B1/ THIAMINE   Lab Results   Component Value Date    CNOK3APKYWN 120 11/19/2018        RBC FOLATE   Lab Results   Component Value Date    FOLATE 12.3 08/28/2020        LIPID SCREEN (FASTING)   Lab Results   Component Value Date    CHOL 262 08/28/2020    CHOL 167 11/19/2018    TRIG 140 08/28/2020    HDL 56 08/28/2020    [address] 82 11/19/2018   , HGA1C (T2DM ONLY)   Lab Results   Component Value Date    LABA1C 5.8 08/28/2020    AVGG 117 11/19/2018        TSH   Lab Results   Component Value Date    TSH 3.560 11/19/2018        IRON   Lab Results   Component Value Date    IRON 69 11/19/2018        IONIZED CALCIUM   No results found for: MACKENZIE DALLAS      Assessment:       Diagnosis Orders   1. Status post laparoscopic sleeve gastrectomy     2. Class 1 obesity due to excess calories with body mass index (BMI) of 31.0 to 31.9 in adult, unspecified whether serious comorbidity present       Plan:    Stay well hydrated. Drink a minimum of 64 oz of non-carbonated, non-caffeinated fluids daily. Nutritional education occurred during visit. Tolerating diet. Continue following with dietitian and follow their recommendations as directed. Continue  60-80 grams of protein each day. Signs and symptoms reviewed with patient that would be concerning and need her to return to office for re-evaluation. Patient will call if any questions or concerns arrise. Importance of physical activity discussed with patient. Increase physical activity as tolerated  Add strength training- when able  Continue taking Multivitamin/Calcium  Encouraged to attend support groups  1 year labs reviewed with patient today  Advised to follow up with PCP for elevated cholesterol panel  SECA scale next visit  Will send referral to Dr. Reyna Modi for excess skin evaluation  Return in about 6 months (around 2/28/2021) for postop follow up. I spent over 15 minutes with the patient, with greater that 50% of that time spent on face counseling for nutrition and exercise.     Electronically signed by TAINA Shankar on 8/31/2020 at 3:46 PM

## 2020-08-31 NOTE — PROGRESS NOTES
dad moving back into home  Assessment  Adequate weight loss at one year post op. Continues to supplement with daily protein shake and routine vitamins. Plan  Plan/Recommendations:    -Goals:  1. Protein goal is 60-80 grams protein per day. Remember to choose protein foods first at meals to meet this goal, followed by vegetables, then fruit, and starch food last if still hungry. 2.  Water goal is 64 oz per day. Separate liquids from solids. No liquids 30 minutes before meals and no liquids 30 minutes after your meals. 3.  Take 20-30 minutes to eat - this helps with mindful eating as well. Do not skip meals and stick to consistent meal times schedule as much as possible. 4.  Physical activity remains important to maintain weight loss efforts  5. Routine vitamin intake is important to avoid deficiencies. Continue the following:   Centrum Multivitamin 1-2 per day. Add Calcium 500-600 mg for bone health. 6.  Follow up with family doctor about elevated cholesterol levels.         Recommended Follow-Up: 18 month post op with PA and RD    Amna Hansen RD, LD  Dietitian- [facility]

## 2020-09-02 ENCOUNTER — OFFICE VISIT (OUTPATIENT)
Dept: SURGERY | Age: 50
End: 2020-09-02
Payer: MEDICAID

## 2020-09-02 VITALS — BODY MASS INDEX: 31.61 KG/M2 | TEMPERATURE: 97.1 F | WEIGHT: 161 LBS | HEIGHT: 60 IN

## 2020-09-02 LAB — THIAMINE BLOOD: 131 NMOL/L (ref 70–180)

## 2020-09-02 PROCEDURE — 99205 OFFICE O/P NEW HI 60 MIN: CPT | Performed by: SURGERY

## 2020-09-02 PROCEDURE — G8417 CALC BMI ABV UP PARAM F/U: HCPCS | Performed by: SURGERY

## 2020-09-02 PROCEDURE — 1036F TOBACCO NON-USER: CPT | Performed by: SURGERY

## 2020-09-02 PROCEDURE — G8427 DOCREV CUR MEDS BY ELIG CLIN: HCPCS | Performed by: SURGERY

## 2020-09-02 ASSESSMENT — ENCOUNTER SYMPTOMS
FACIAL SWELLING: 0
NAUSEA: 0
BACK PAIN: 1
COLOR CHANGE: 0
ABDOMINAL DISTENTION: 0
SINUS PAIN: 0
TROUBLE SWALLOWING: 0
VOMITING: 0
COUGH: 0
CONSTIPATION: 0
DIARRHEA: 0
SHORTNESS OF BREATH: 0
SINUS PRESSURE: 0
PHOTOPHOBIA: 0
ABDOMINAL PAIN: 0

## 2020-09-02 ASSESSMENT — VISUAL ACUITY: OU: 1

## 2020-09-02 NOTE — PROGRESS NOTES
[facility]  [facility] PLASTIC [facility]GERY  [address]  [address] [facility] [address]  Dept: [phone]  Dept Fax: [phone]  Loc: Celia Rivero MD  9/2/2020  History and Physical    Patient: Lidia Hernandez  Referring Provider: TAINA Layton  [address]  [place], [place] [address]  Age: 52 y.o. Height:   Ht Readings from Last 1 Encounters:   09/02/20 5' (1.524 m)       Weight: 161 lb (73 kg)  Last year weight range: 158 pounds to 267 pounds  Bra Size: 38 DD  Allergies: Allergies   Allergen Reactions    Celebrex [Celecoxib] Other (See Comments)     Patient states double vision    Toradol [Ketorolac Tromethamine]      Not sure of reaction    Morphine Rash      Past Medical History:   Past Medical History:   Diagnosis Date    Arthritis     Hyperlipidemia      Past Surgical History:   Past Surgical History:   Procedure Laterality Date    ANKLE SURGERY Left 2006    ANKLE SURGERY Right 03/2019    plate and screws    COLONOSCOPY      FRACTURE SURGERY      HYSTERECTOMY, TOTAL ABDOMINAL      HYSTEROPLASTY      SLEEVE GASTRECTOMY N/A 8/12/2019    ROBOTIC SLEEVE GASTRECTOMY performed by Jeannette Barron MD at [address] [facility] 2/18/2019    EGD WITH BX performed by Marivel Zuniga MD at [address] [facility] Right 2014    plate and screws     Past Social History:  reports that she quit smoking about 16 years ago. Her smoking use included cigarettes. She smoked 1.00 pack per day. She has never used smokeless tobacco. She reports current alcohol use. She reports that she does not use drugs.    Current Medications:   Current Outpatient Medications   Medication Sig Dispense Refill    Multiple Vitamins-Minerals (THERAPEUTIC MULTIVITAMIN-MINERALS) tablet Take 1 tablet by mouth daily      acyclovir (ZOVIRAX) 800 MG tablet Take 800 mg by mouth 2 times daily      nystatin (MYCOSTATIN) [id number] UNIT/GM cream       traMADol (ULTRAM) 50 MG tablet TAKE 1 TABLET BY MOUTH EVERY 4 TO 6 HOURS AS NEEDED FOR FOOT PAIN      ibuprofen (ADVIL;MOTRIN) 200 MG tablet Take by mouth      Biotin 5000 MCG CAPS Take 1 capsule by mouth daily       No current facility-administered medications for this visit. Breast History:   Last mammogram: August 2020 and is normal  Previous Breast Surgery: None  Family History of Breast Cancer: No    Chief Complaint: No chief complaint on file. 1.Abdominal pannus with persistent intertrigo  2. Macromastia    HPI: Bryant Ashley is a 82-year-old female who is referred to me from both her primary care physician and the bariatric center for evaluation for bilateral breast reduction as well as panniculectomy following massive weight loss. She underwent a gastric sleeve procedure in August 2019 and since that time she has lost upwards of 110 pounds. The massive weight loss has left her with a double fold pannus on the anterior abdomen extending bilaterally to each hip which gives her persistent problems with a rash under both the upper and lower pannus, for which she utilizes nystatin powder or cream to control. She also has issues with macromastia and grade 3 pendulous breasts creating an inframammary rash. Both her large pendulous breasts and the amount of pannus on her abdomen significantly impact her activities of daily living and interfere with household work, exercise, sporting activities. She presents for evaluation for bilateral reduction mammoplasty and panniculectomy. Which of the following symptoms do you have? Upper Back Pain:  Yes  Lower Back Pain:  Yes  Grooves in Shoulders: Yes  Rash under breasts: Yes  Shoulder Pain: No  Arm Pain: No  Neck Pain: Yes  Tingling in Fingers: No  Breast pain: No  Headache: No  How long have you had these symptoms: 2 years. Which of the following have you tried for relief?   Over the counter medication:  Yes  Creams for skin rash: Yes  Support bras with wide straps: Yes  Chiropractor/Orthopedist: No  Weight Loss: 106  Lbs. Massages: No  Heating Pad: No  Prescription Medications: Yes    How effective have these treatments been? A little  What does your breast size interfere with you doing? Household work, exercise, yard work, sporting activities        Subjective:   Review of Systems   Constitutional: Negative for activity change, appetite change, chills, fatigue, fever and unexpected weight change. HENT: Negative for dental problem, facial swelling, nosebleeds, sinus pressure, sinus pain and trouble swallowing. Eyes: Negative for photophobia and visual disturbance. Respiratory: Negative for cough and shortness of breath. Cardiovascular: Negative for chest pain and leg swelling. Gastrointestinal: Negative for abdominal distention, abdominal pain, constipation, diarrhea, nausea and vomiting. Endocrine: Negative for cold intolerance and heat intolerance. Musculoskeletal: Positive for back pain. Negative for arthralgias, neck pain and neck stiffness. Skin: Negative for color change, pallor, rash and wound. Neurological: Negative for dizziness, facial asymmetry, light-headedness, numbness and headaches. Hematological: Does not bruise/bleed easily. Objective:   Temp 97.1 °F (36.2 °C)   Ht 5' (1.524 m)   Wt 161 lb (73 kg)   BMI 31.44 kg/m²     Physical Exam:  Physical Exam   Nursing note and vitals reviewed. Constitutional  She appears well-developed and well-nourished. No distress. Eyes  Pupils are equal, round, and reactive to light. System normal.     General -             Right: Right eye is negative for discharge. Right eye extraocular movements are normal.             Left: Left eye is negative for discharge. Left eye extraocular movements are normal.     Cardiovascular: Normal rate, regular rhythm, intact distal pulses and normal pulses. Exam reveals no decreased pulses.      Pulmonary/Chest  Effort normal. No respiratory distress. Skin  Skin is warm and dry. No rash noted. No erythema. Psychiatric  Her behavior is normal. Judgment and thought content normal.     Abdomen and Hips  Normal appearance and bowel sounds are normal. Soft. Hernia confirmed negative in the umbilical area and confirmed negative in the ventral area. There is no abdominal tenderness. Abdominal shape is protuberant and panniculus. She has abdominal striae. A surgical scar is present. She has horizontal, abdominal skin redundancy. Patient has excess abdominal fat. Excess fat located in the: upper abdomen and lower abdomen. Zones of adherence: linea alba. Extremities  Arms: She is positive for redundant arm skin and bat wings. She is negative for RUE laxity. She is also positive for upper extremity lipodystrophy which is located at the upper arm to elbow. Legs: She is positive for redundant leg skin. She is positive for lower extremity lipodystrophy which is located at the inner thighs, saddle bags and medio-patellar. Breast    Additional breast conditions include the following:   Right Breast: Negative for skin change, nipple discharge and inverted nipple. She is negative for a right mass. Left Breast: Negative for skin change, nipple discharge and inverted nipple. She is negative for a left mass. Back and Buttocks  Her back and buttocks are normal and pear-shaped. Her skin tone is hypotonic. Zones of adherence: spine. Her back and buttocks skin is positive for horizontal redundancy. Patient is positive for skin folds. Skin folds located in the following area(s): breast and lower thoracic. Her back fat and buttocks fat have normal distribution. Physical Exam  Vitals signs and nursing note reviewed. Exam conducted with a chaperone present. Constitutional:       General: She is awake. She is not in acute distress. Appearance: Normal appearance. She is well-developed, well-groomed, normal weight and well-nourished.    HENT: Head: Normocephalic and atraumatic. Jaw: There is normal jaw occlusion. Right Ear: Hearing and external ear normal.      Left Ear: Hearing and external ear normal.      Nose: Nose normal.      Mouth/Throat:      Lips: Pink. Mouth: Mucous membranes are moist.      Pharynx: Oropharynx is clear. Eyes:   System normal.    General: Lids are normal. Vision grossly intact. Right eye: No discharge. Left eye: No discharge. Extraocular Movements: Extraocular movements intact. Conjunctiva/sclera: Conjunctivae normal.      Pupils: Pupils are equal, round, and reactive to light. Neck:      Musculoskeletal: Full passive range of motion without pain, normal range of motion and neck supple. Thyroid: No thyromegaly. Trachea: Trachea and phonation normal. No tracheal deviation. Cardiovascular:      Rate and Rhythm: Normal rate and regular rhythm. Pulses: Normal pulses and intact distal pulses. No decreased pulses. Pulmonary:      Effort: Pulmonary effort is normal. No respiratory distress. Chest:      Breasts: Lex Score is 5. Right: Normal. No swelling, inverted nipple, mass, nipple discharge, skin change or tenderness. Left: Normal. No swelling, inverted nipple, mass, nipple discharge, skin change or tenderness. Abdominal:      General: Abdomen is protuberant. A surgical scar is present. Bowel sounds are normal. There is no distension. Palpations: Abdomen is soft. There is no mass. Tenderness: There is no abdominal tenderness. Hernia: No hernia is present. There is no hernia in the umbilical area or ventral area. Musculoskeletal: Normal range of motion. General: No deformity. Lymphadenopathy:      Upper Body:      Right upper body: No supraclavicular, axillary or pectoral adenopathy. Left upper body: No supraclavicular, axillary or pectoral adenopathy. Skin:     General: Skin is warm and dry.

## 2020-10-09 ENCOUNTER — TELEPHONE (OUTPATIENT)
Dept: SURGERY | Age: 50
End: 2020-10-09

## 2020-10-12 ENCOUNTER — TELEPHONE (OUTPATIENT)
Dept: SURGERY | Age: 50
End: 2020-10-12

## 2020-11-02 NOTE — PROGRESS NOTES
Following instructions given to patient, who states understanding:     NPO after midnight  [employer] and 's license  Wear comfortable clean clothing  Do not bring jewelry   Shower night before and morning of surgery with a liquid antibacterial soap  Bring medications in original bottles  Follow all instructions given by your physician   needed at discharge  Call [phone] for any questions  Report to \[facility]\"" on 2nd floor  If you would become ill prior to surgery, please call the surgeon  May have only 1 visitor accompany you for surgery  Please bring and wear mask  You will be receiving a phone call one or two days before surgery to review covid screening exposure     Covid screening questionnaire complete and negative for symptoms or exposure see chart for documentation.      Plans to get covid test 11/3/20 at [facility]    Please limit your exposure to the public after you have your covid test  Please call your doctor immediately if you develop any symptoms of covid prior to your surgery

## 2020-11-03 ENCOUNTER — HOSPITAL ENCOUNTER (OUTPATIENT)
Age: 50
Discharge: HOME | End: 2020-11-03
Payer: MEDICAID

## 2020-11-03 ENCOUNTER — OFFICE VISIT (OUTPATIENT)
Dept: SURGERY | Age: 50
End: 2020-11-03
Payer: MEDICAID

## 2020-11-03 VITALS — HEART RATE: 42 BPM | TEMPERATURE: 96.7 F | SYSTOLIC BLOOD PRESSURE: 130 MMHG | DIASTOLIC BLOOD PRESSURE: 82 MMHG

## 2020-11-03 LAB
EKG ATRIAL RATE: 56 BPM
EKG P AXIS: 50 DEGREES
EKG P-R INTERVAL: 194 MS
EKG Q-T INTERVAL: 430 MS
EKG QRS DURATION: 94 MS
EKG QTC CALCULATION (BAZETT): 414 MS
EKG R AXIS: 92 DEGREES
EKG T AXIS: 39 DEGREES
EKG VENTRICULAR RATE: 56 BPM

## 2020-11-03 PROCEDURE — G8417 CALC BMI ABV UP PARAM F/U: HCPCS | Performed by: SURGERY

## 2020-11-03 PROCEDURE — G8484 FLU IMMUNIZE NO ADMIN: HCPCS | Performed by: SURGERY

## 2020-11-03 PROCEDURE — U0003 INFECTIOUS AGENT DETECTION BY NUCLEIC ACID (DNA OR RNA); SEVERE ACUTE RESPIRATORY SYNDROME CORONAVIRUS 2 (SARS-COV-2) (CORONAVIRUS DISEASE [COVID-19]), AMPLIFIED PROBE TECHNIQUE, MAKING USE OF HIGH THROUGHPUT TECHNOLOGIES AS DESCRIBED BY CMS-2020-01-R: HCPCS

## 2020-11-03 PROCEDURE — G8427 DOCREV CUR MEDS BY ELIG CLIN: HCPCS | Performed by: SURGERY

## 2020-11-03 PROCEDURE — 93010 ELECTROCARDIOGRAM REPORT: CPT | Performed by: INTERNAL MEDICINE

## 2020-11-03 PROCEDURE — 93005 ELECTROCARDIOGRAM TRACING: CPT

## 2020-11-03 PROCEDURE — 1036F TOBACCO NON-USER: CPT | Performed by: SURGERY

## 2020-11-03 PROCEDURE — 99214 OFFICE O/P EST MOD 30 MIN: CPT | Performed by: SURGERY

## 2020-11-03 ASSESSMENT — ENCOUNTER SYMPTOMS
SINUS PRESSURE: 0
BACK PAIN: 1
COUGH: 0
COLOR CHANGE: 0
TROUBLE SWALLOWING: 0
SHORTNESS OF BREATH: 0
SINUS PAIN: 0
FACIAL SWELLING: 0
ABDOMINAL PAIN: 0
PHOTOPHOBIA: 0

## 2020-11-03 NOTE — H&P
HISTORY AND PHYSICAL             Date: 11/3/2020        Patient Name: Indy Daniel     YOB: 1970      Age:  52 y.o. Chief Complaint   No chief complaint on file. Symptomatic macromastia and lower abdominal pannus with intertriginous rash    History Obtained From   patient    History of Present Illness   Sergei Simental is a 51-year-old female who was referred to my office following a bariatric procedure for evaluation for symptomatic macromastia and the excess abdominal skin and fat creating a large lower abdominal pannus with an associated intertriginous rash. Past Medical History     Past Medical History:   Diagnosis Date    Arthritis     Hyperlipidemia         Past Surgical History     Past Surgical History:   Procedure Laterality Date    ANKLE SURGERY Left 2006    ANKLE SURGERY Right 03/2019    plate and screws    COLONOSCOPY      FRACTURE SURGERY      HYSTERECTOMY, TOTAL ABDOMINAL      HYSTEROPLASTY      SLEEVE GASTRECTOMY N/A 8/12/2019    ROBOTIC SLEEVE GASTRECTOMY performed by Ailyn Valiente MD at [address] 2/18/2019    EGD WITH BX performed by Gray Anderson MD at [facility]    WRIST SURGERY Right 2014    plate and screws        Medications Prior to Admission     Prior to Admission medications    Medication Sig Start Date End Date Taking?  Authorizing Provider   Multiple Vitamins-Minerals (THERAPEUTIC MULTIVITAMIN-MINERALS) tablet Take 1 tablet by mouth daily   Yes Historical Provider, MD   acyclovir (ZOVIRAX) 800 MG tablet Take 800 mg by mouth 2 times daily   Yes Historical Provider, MD   nystatin (MYCOSTATIN) 249012 UNIT/GM cream  8/28/20  Yes Historical Provider, MD   traMADol (ULTRAM) 50 MG tablet TAKE 1 TABLET BY MOUTH EVERY 4 TO 6 HOURS AS NEEDED FOR FOOT PAIN 8/21/20  Yes Historical Provider, MD   ibuprofen (ADVIL;MOTRIN) 200 MG tablet Take 800 mg by mouth every 8 hours as needed    Yes Historical Provider, MD   Biotin 5000 MCG CAPS Take 1 capsule by mouth daily   Yes Historical Provider, MD        Allergies   Celebrex [celecoxib]; Toradol [ketorolac tromethamine]; and Morphine    Social History     Social History     Tobacco History     Smoking Status  Former Smoker Quit date  2/18/2004 Smoking Frequency  1 pack/day Smoking Tobacco Type  Cigarettes    Smokeless Tobacco Use  Never Used          Alcohol History     Alcohol Use Status  Yes Comment  rare          Drug Use     Drug Use Status  No          Sexual Activity     Sexually Active  Not Asked                Family History     Family History   Problem Relation Age of Onset    Arthritis Mother     High Blood Pressure Mother     Arthritis Father     Diabetes Father     Dementia Father     Parkinsonism Father     High Blood Pressure Father     Cancer Paternal Uncle         colon       Review of Systems   Review of Systems   Constitutional: Negative for activity change, appetite change, chills, fatigue, fever and unexpected weight change. HENT: Negative for dental problem, facial swelling, nosebleeds, sinus pressure, sinus pain and trouble swallowing. Eyes: Negative for photophobia and visual disturbance. Respiratory: Negative for cough and shortness of breath. Cardiovascular: Negative for chest pain and leg swelling. Gastrointestinal: Negative for abdominal pain. Endocrine: Negative for cold intolerance and heat intolerance. Musculoskeletal: Positive for back pain and neck pain. Negative for neck stiffness. Skin: Positive for rash. Negative for color change, pallor and wound. Neurological: Negative for dizziness, facial asymmetry, light-headedness, numbness and headaches. Hematological: Does not bruise/bleed easily. Physical Exam   /82   Pulse (!) 42   Temp 96.7 °F (35.9 °C)     Physical Exam  Vitals signs and nursing note reviewed. Exam conducted with a chaperone present. Constitutional:       General: She is awake.  She is not in acute adenopathy. Left upper body: No supraclavicular, axillary or pectoral adenopathy. Skin:     General: Skin is warm. Capillary Refill: Capillary refill takes 2 to 3 seconds. Findings: No erythema or rash. Neurological:      General: No focal deficit present. Mental Status: She is alert and oriented to person, place, and time. Mental status is at baseline. Sensory: No sensory deficit. Psychiatric:         Mood and Affect: Mood normal.         Behavior: Behavior normal. Behavior is cooperative. Thought Content: Thought content normal.         Judgment: Judgment normal.         Labs      Recent Results (from the past 24 hour(s))   EKG 12 Lead    Collection Time: 11/03/20  3:14 PM   Result Value Ref Range    Ventricular Rate 56 BPM    Atrial Rate 56 BPM    P-R Interval 194 ms    QRS Duration 94 ms    Q-T Interval 430 ms    QTc Calculation (Bazett) 414 ms    P Axis 50 degrees    R Axis 92 degrees    T Axis 39 degrees        Imaging/Diagnostics Last 24 Hours   No results found. Assessment    1. Symptomatic macromastia  2. Abdominal pannus with intractable intertriginous rash    Plan   1. Bilateral reduction mammoplasty  2.  Panniculectomy    Consultations Ordered:  None    Electronically signed by Meli Lennon MD on 11/3/20 at 3:53 PM EST

## 2020-11-03 NOTE — H&P (VIEW-ONLY)
HISTORY AND PHYSICAL             Date: 11/3/2020        Patient Name: Say Chamberlain     YOB: 1970      Age:  52 y.o. Chief Complaint   No chief complaint on file. Symptomatic macromastia and lower abdominal pannus with intertriginous rash    History Obtained From   patient    History of Present Illness   Kristy Mueller is a 57-year-old female who was referred to my office following a bariatric procedure for evaluation for symptomatic macromastia and the excess abdominal skin and fat creating a large lower abdominal pannus with an associated intertriginous rash. Past Medical History     Past Medical History:   Diagnosis Date    Arthritis     Hyperlipidemia         Past Surgical History     Past Surgical History:   Procedure Laterality Date    ANKLE SURGERY Left 2006    ANKLE SURGERY Right 03/2019    plate and screws    COLONOSCOPY      FRACTURE SURGERY      HYSTERECTOMY, TOTAL ABDOMINAL      HYSTEROPLASTY      SLEEVE GASTRECTOMY N/A 8/12/2019    ROBOTIC SLEEVE GASTRECTOMY performed by Odilon Novak MD at [address] [place] [address] 2/18/2019    EGD WITH BX [facility]arda [facility]    WRIST SURGERY Right 2014    plate and screws        Medications Prior to Admission     Prior to Admission medications    Medication Sig Start Date End Date Taking?  Authorizing Provider   Multiple Vitamins-Minerals (THERAPEUTIC MULTIVITAMIN-MINERALS) tablet Take 1 tablet by mouth daily   Yes Historical Provider, MD   acyclovir (ZOVIRAX) 800 MG tablet Take 800 mg by mouth 2 times daily   Yes Historical Provider, MD   nystatin (MYCOSTATIN) 629861 UNIT/GM cream  8/28/20  Yes Historical Provider, MD   traMADol (ULTRAM) 50 MG tablet TAKE 1 TABLET BY MOUTH EVERY 4 TO 6 HOURS AS NEEDED FOR FOOT PAIN 8/21/20  Yes Historical Provider, MD   ibuprofen (ADVIL;MOTRIN) 200 MG tablet Take 800 mg by mouth every 8 hours as needed    Yes Historical Provider, MD   Biotin 5000 MCG CAPS Take 1 capsule by mouth daily   Yes Historical Provider, MD        Allergies   Celebrex [celecoxib]; Toradol [ketorolac tromethamine]; and Morphine    Social History     Social History     Tobacco History     Smoking Status  Former Smoker Quit date  2/18/2004 Smoking Frequency  1 pack/day Smoking Tobacco Type  Cigarettes    Smokeless Tobacco Use  Never Used          Alcohol History     Alcohol Use Status  Yes Comment  rare          Drug Use     Drug Use Status  No          Sexual Activity     Sexually Active  Not Asked                Family History     Family History   Problem Relation Age of Onset    Arthritis Mother     High Blood Pressure Mother     Arthritis Father     Diabetes Father     Dementia Father     Parkinsonism Father     High Blood Pressure Father     Cancer Paternal Uncle         colon       Review of Systems   Review of Systems   Constitutional: Negative for activity change, appetite change, chills, fatigue, fever and unexpected weight change. HENT: Negative for dental problem, facial swelling, nosebleeds, sinus pressure, sinus pain and trouble swallowing. Eyes: Negative for photophobia and visual disturbance. Respiratory: Negative for cough and shortness of breath. Cardiovascular: Negative for chest pain and leg swelling. Gastrointestinal: Negative for abdominal pain. Endocrine: Negative for cold intolerance and heat intolerance. Musculoskeletal: Positive for back pain and neck pain. Negative for neck stiffness. Skin: Positive for rash. Negative for color change, pallor and wound. Neurological: Negative for dizziness, facial asymmetry, light-headedness, numbness and headaches. Hematological: Does not bruise/bleed easily. Physical Exam   /82   Pulse (!) 42   Temp 96.7 °F (35.9 °C)     Physical Exam  Vitals signs and nursing note reviewed. Exam conducted with a chaperone present. Constitutional:       General: She is awake.  She is not in acute adenopathy. Left upper body: No supraclavicular, axillary or pectoral adenopathy. Skin:     General: Skin is warm. Capillary Refill: Capillary refill takes 2 to 3 seconds. Findings: No erythema or rash. Neurological:      General: No focal deficit present. Mental Status: She is alert and oriented to person, place, and time. Mental status is at baseline. Sensory: No sensory deficit. Psychiatric:         Mood and Affect: Mood normal.         Behavior: Behavior normal. Behavior is cooperative. Thought Content: Thought content normal.         Judgment: Judgment normal.         Labs      Recent Results (from the past 24 hour(s))   EKG 12 Lead    Collection Time: 11/03/20  3:14 PM   Result Value Ref Range    Ventricular Rate 56 BPM    Atrial Rate 56 BPM    P-R Interval 194 ms    QRS Duration 94 ms    Q-T Interval 430 ms    QTc Calculation (Bazett) 414 ms    P Axis 50 degrees    R Axis 92 degrees    T Axis 39 degrees        Imaging/Diagnostics Last 24 Hours   No results found. Assessment    1. Symptomatic macromastia  2. Abdominal pannus with intractable intertriginous rash    Plan   1. Bilateral reduction mammoplasty  2.  Panniculectomy    Consultations Ordered:  None    Electronically signed by Yao Bruno MD on 11/3/20 at 3:53 PM EST

## 2020-11-04 RX ORDER — SODIUM CHLORIDE 0.9 % (FLUSH) 0.9 %
10 SYRINGE (ML) INJECTION EVERY 12 HOURS SCHEDULED
Status: CANCELLED | OUTPATIENT
Start: 2020-11-04

## 2020-11-04 RX ORDER — SODIUM CHLORIDE 0.9 % (FLUSH) 0.9 %
10 SYRINGE (ML) INJECTION PRN
Status: CANCELLED | OUTPATIENT
Start: 2020-11-04

## 2020-11-04 ASSESSMENT — VISUAL ACUITY: OU: 1

## 2020-11-05 LAB — SARS-COV-2: NOT DETECTED

## 2020-11-06 NOTE — PROGRESS NOTES
Patient contacted regarding COVID-19 screen. Following questions asked: In the last month, have you been in contact with someone who was confirmed or suspected to have Coronavirus/COVID-19:  Patient stated NO    Pt was informed can be a visitor allowed. Please bring masks. Do you or family members have any of the following symptoms:  Cough-no   Muscle pain-no   Shortness of breath-no   Fever-no   Weakness-no  Severe headache-no   Sore throat-no   Respiratory symptoms-no    Have you traveled internationally in the last month?  No     Have you been to the emergency room recently-no

## 2020-11-06 NOTE — PROGRESS NOTES
Patient contacted regarding COVID-19 screen. Test was done on 11/3/20 at [facility]  Following questions asked:    No answer at this time, message left to return call to \[facility] [place] [facility]\"", unit # provided.

## 2020-11-08 ENCOUNTER — ANESTHESIA EVENT (OUTPATIENT)
Dept: OPERATING ROOM | Age: 50
DRG: 363 | End: 2020-11-08
Payer: MEDICAID

## 2020-11-09 ENCOUNTER — ANESTHESIA (OUTPATIENT)
Dept: OPERATING ROOM | Age: 50
DRG: 363 | End: 2020-11-09
Payer: MEDICAID

## 2020-11-09 ENCOUNTER — HOSPITAL ENCOUNTER (INPATIENT)
Age: 50
LOS: 1 days | Discharge: HOME HEALTH | DRG: 363 | End: 2020-11-12
Attending: SURGERY | Admitting: SURGERY
Payer: MEDICAID

## 2020-11-09 VITALS
SYSTOLIC BLOOD PRESSURE: 138 MMHG | TEMPERATURE: 95 F | OXYGEN SATURATION: 100 % | RESPIRATION RATE: 7 BRPM | DIASTOLIC BLOOD PRESSURE: 73 MMHG

## 2020-11-09 PROBLEM — E65 ABDOMINAL PANNUS: Status: ACTIVE | Noted: 2020-11-09

## 2020-11-09 PROBLEM — N62 MACROMASTIA: Status: ACTIVE | Noted: 2020-11-09

## 2020-11-09 PROCEDURE — 3700000001 HC ADD 15 MINUTES (ANESTHESIA): Performed by: SURGERY

## 2020-11-09 PROCEDURE — 6370000000 HC RX 637 (ALT 250 FOR IP): Performed by: SURGERY

## 2020-11-09 PROCEDURE — 15830 EXC EXCESSIVE SKIN ABDOMEN: CPT | Performed by: SURGERY

## 2020-11-09 PROCEDURE — 6360000002 HC RX W HCPCS: Performed by: SURGERY

## 2020-11-09 PROCEDURE — 19318 BREAST REDUCTION: CPT | Performed by: SURGERY

## 2020-11-09 PROCEDURE — 2580000003 HC RX 258: Performed by: SURGERY

## 2020-11-09 PROCEDURE — 2500000003 HC RX 250 WO HCPCS

## 2020-11-09 PROCEDURE — 2709999900 HC NON-CHARGEABLE SUPPLY: Performed by: SURGERY

## 2020-11-09 PROCEDURE — 2580000003 HC RX 258

## 2020-11-09 PROCEDURE — 6370000000 HC RX 637 (ALT 250 FOR IP)

## 2020-11-09 PROCEDURE — 6360000002 HC RX W HCPCS: Performed by: ANESTHESIOLOGY

## 2020-11-09 PROCEDURE — 88307 TISSUE EXAM BY PATHOLOGIST: CPT

## 2020-11-09 PROCEDURE — 0JB80ZZ EXCISION OF ABDOMEN SUBCUTANEOUS TISSUE AND FASCIA, OPEN APPROACH: ICD-10-PCS | Performed by: SURGERY

## 2020-11-09 PROCEDURE — 7100000000 HC PACU RECOVERY - FIRST 15 MIN: Performed by: SURGERY

## 2020-11-09 PROCEDURE — 6360000002 HC RX W HCPCS

## 2020-11-09 PROCEDURE — 3600000012 HC SURGERY LEVEL 2 ADDTL 15MIN: Performed by: SURGERY

## 2020-11-09 PROCEDURE — 3600000002 HC SURGERY LEVEL 2 BASE: Performed by: SURGERY

## 2020-11-09 PROCEDURE — 3700000000 HC ANESTHESIA ATTENDED CARE: Performed by: SURGERY

## 2020-11-09 PROCEDURE — 2500000003 HC RX 250 WO HCPCS: Performed by: SURGERY

## 2020-11-09 PROCEDURE — 7100000001 HC PACU RECOVERY - ADDTL 15 MIN: Performed by: SURGERY

## 2020-11-09 PROCEDURE — 0HBV0ZZ EXCISION OF BILATERAL BREAST, OPEN APPROACH: ICD-10-PCS | Performed by: SURGERY

## 2020-11-09 PROCEDURE — G0378 HOSPITAL OBSERVATION PER HR: HCPCS

## 2020-11-09 RX ORDER — MORPHINE SULFATE 2 MG/ML
2 INJECTION, SOLUTION INTRAMUSCULAR; INTRAVENOUS
Status: CANCELLED | OUTPATIENT
Start: 2020-11-09

## 2020-11-09 RX ORDER — OXYCODONE HYDROCHLORIDE 5 MG/1
10 TABLET ORAL EVERY 4 HOURS PRN
Status: CANCELLED | OUTPATIENT
Start: 2020-11-09

## 2020-11-09 RX ORDER — HETASTARCH 6 G/100ML
500 INJECTION, SOLUTION INTRAVENOUS CONTINUOUS
Status: DISCONTINUED | OUTPATIENT
Start: 2020-11-10 | End: 2020-11-12 | Stop reason: HOSPADM

## 2020-11-09 RX ORDER — MORPHINE SULFATE 2 MG/ML
4 INJECTION, SOLUTION INTRAMUSCULAR; INTRAVENOUS
Status: CANCELLED | OUTPATIENT
Start: 2020-11-09

## 2020-11-09 RX ORDER — SODIUM CHLORIDE 0.9 % (FLUSH) 0.9 %
10 SYRINGE (ML) INJECTION EVERY 12 HOURS SCHEDULED
Status: DISCONTINUED | OUTPATIENT
Start: 2020-11-09 | End: 2020-11-09 | Stop reason: SDUPTHER

## 2020-11-09 RX ORDER — HYDROCODONE BITARTRATE AND ACETAMINOPHEN 5; 325 MG/1; MG/1
1 TABLET ORAL EVERY 4 HOURS PRN
Status: DISCONTINUED | OUTPATIENT
Start: 2020-11-09 | End: 2020-11-10

## 2020-11-09 RX ORDER — PROMETHAZINE HYDROCHLORIDE 25 MG/ML
6.25 INJECTION, SOLUTION INTRAMUSCULAR; INTRAVENOUS
Status: DISCONTINUED | OUTPATIENT
Start: 2020-11-09 | End: 2020-11-09 | Stop reason: HOSPADM

## 2020-11-09 RX ORDER — EPHEDRINE SULFATE/0.9% NACL/PF 50 MG/5 ML
SYRINGE (ML) INTRAVENOUS PRN
Status: DISCONTINUED | OUTPATIENT
Start: 2020-11-09 | End: 2020-11-09 | Stop reason: SDUPTHER

## 2020-11-09 RX ORDER — ONDANSETRON 2 MG/ML
4 INJECTION INTRAMUSCULAR; INTRAVENOUS EVERY 6 HOURS PRN
Status: DISCONTINUED | OUTPATIENT
Start: 2020-11-09 | End: 2020-11-12 | Stop reason: HOSPADM

## 2020-11-09 RX ORDER — PROPOFOL 10 MG/ML
INJECTION, EMULSION INTRAVENOUS PRN
Status: DISCONTINUED | OUTPATIENT
Start: 2020-11-09 | End: 2020-11-09 | Stop reason: SDUPTHER

## 2020-11-09 RX ORDER — METHOCARBAMOL 500 MG/1
750 TABLET, FILM COATED ORAL 3 TIMES DAILY
Status: DISCONTINUED | OUTPATIENT
Start: 2020-11-09 | End: 2020-11-12 | Stop reason: HOSPADM

## 2020-11-09 RX ORDER — SODIUM CHLORIDE 9 MG/ML
INJECTION, SOLUTION INTRAVENOUS CONTINUOUS PRN
Status: DISCONTINUED | OUTPATIENT
Start: 2020-11-09 | End: 2020-11-09 | Stop reason: SDUPTHER

## 2020-11-09 RX ORDER — SODIUM CHLORIDE 9 MG/ML
INJECTION, SOLUTION INTRAVENOUS CONTINUOUS
Status: DISCONTINUED | OUTPATIENT
Start: 2020-11-09 | End: 2020-11-12 | Stop reason: HOSPADM

## 2020-11-09 RX ORDER — ROCURONIUM BROMIDE 10 MG/ML
INJECTION, SOLUTION INTRAVENOUS PRN
Status: DISCONTINUED | OUTPATIENT
Start: 2020-11-09 | End: 2020-11-09 | Stop reason: SDUPTHER

## 2020-11-09 RX ORDER — PROMETHAZINE HYDROCHLORIDE 25 MG/1
12.5 TABLET ORAL EVERY 6 HOURS PRN
Status: DISCONTINUED | OUTPATIENT
Start: 2020-11-09 | End: 2020-11-12 | Stop reason: HOSPADM

## 2020-11-09 RX ORDER — MEPERIDINE HYDROCHLORIDE 25 MG/ML
12.5 INJECTION INTRAMUSCULAR; INTRAVENOUS; SUBCUTANEOUS EVERY 5 MIN PRN
Status: DISCONTINUED | OUTPATIENT
Start: 2020-11-09 | End: 2020-11-09 | Stop reason: HOSPADM

## 2020-11-09 RX ORDER — FENTANYL CITRATE 50 UG/ML
INJECTION, SOLUTION INTRAMUSCULAR; INTRAVENOUS PRN
Status: DISCONTINUED | OUTPATIENT
Start: 2020-11-09 | End: 2020-11-09 | Stop reason: SDUPTHER

## 2020-11-09 RX ORDER — ONDANSETRON 2 MG/ML
INJECTION INTRAMUSCULAR; INTRAVENOUS PRN
Status: DISCONTINUED | OUTPATIENT
Start: 2020-11-09 | End: 2020-11-09 | Stop reason: SDUPTHER

## 2020-11-09 RX ORDER — GLYCOPYRROLATE 1 MG/5 ML
SYRINGE (ML) INTRAVENOUS PRN
Status: DISCONTINUED | OUTPATIENT
Start: 2020-11-09 | End: 2020-11-09 | Stop reason: SDUPTHER

## 2020-11-09 RX ORDER — HYDROMORPHONE HCL 110MG/55ML
PATIENT CONTROLLED ANALGESIA SYRINGE INTRAVENOUS PRN
Status: DISCONTINUED | OUTPATIENT
Start: 2020-11-09 | End: 2020-11-09 | Stop reason: SDUPTHER

## 2020-11-09 RX ORDER — NEOSTIGMINE METHYLSULFATE 5 MG/5 ML
SYRINGE (ML) INTRAVENOUS PRN
Status: DISCONTINUED | OUTPATIENT
Start: 2020-11-09 | End: 2020-11-09 | Stop reason: SDUPTHER

## 2020-11-09 RX ORDER — ONDANSETRON 2 MG/ML
4 INJECTION INTRAMUSCULAR; INTRAVENOUS
Status: DISCONTINUED | OUTPATIENT
Start: 2020-11-09 | End: 2020-11-09 | Stop reason: HOSPADM

## 2020-11-09 RX ORDER — LABETALOL 20 MG/4 ML (5 MG/ML) INTRAVENOUS SYRINGE
5 EVERY 10 MIN PRN
Status: DISCONTINUED | OUTPATIENT
Start: 2020-11-09 | End: 2020-11-09 | Stop reason: HOSPADM

## 2020-11-09 RX ORDER — SODIUM CHLORIDE 0.9 % (FLUSH) 0.9 %
10 SYRINGE (ML) INJECTION PRN
Status: DISCONTINUED | OUTPATIENT
Start: 2020-11-09 | End: 2020-11-12 | Stop reason: HOSPADM

## 2020-11-09 RX ORDER — SODIUM CHLORIDE 0.9 % (FLUSH) 0.9 %
10 SYRINGE (ML) INJECTION PRN
Status: DISCONTINUED | OUTPATIENT
Start: 2020-11-09 | End: 2020-11-09 | Stop reason: SDUPTHER

## 2020-11-09 RX ORDER — FENTANYL CITRATE 50 UG/ML
50 INJECTION, SOLUTION INTRAMUSCULAR; INTRAVENOUS EVERY 5 MIN PRN
Status: COMPLETED | OUTPATIENT
Start: 2020-11-09 | End: 2020-11-09

## 2020-11-09 RX ORDER — OXYCODONE HYDROCHLORIDE 5 MG/1
5 TABLET ORAL EVERY 4 HOURS PRN
Status: CANCELLED | OUTPATIENT
Start: 2020-11-09

## 2020-11-09 RX ORDER — SODIUM CHLORIDE 0.9 % (FLUSH) 0.9 %
10 SYRINGE (ML) INJECTION EVERY 12 HOURS SCHEDULED
Status: DISCONTINUED | OUTPATIENT
Start: 2020-11-09 | End: 2020-11-12 | Stop reason: HOSPADM

## 2020-11-09 RX ORDER — FENTANYL CITRATE 50 UG/ML
25 INJECTION, SOLUTION INTRAMUSCULAR; INTRAVENOUS EVERY 5 MIN PRN
Status: DISCONTINUED | OUTPATIENT
Start: 2020-11-09 | End: 2020-11-09 | Stop reason: HOSPADM

## 2020-11-09 RX ORDER — ACETAMINOPHEN 325 MG/1
650 TABLET ORAL EVERY 4 HOURS PRN
Status: DISCONTINUED | OUTPATIENT
Start: 2020-11-09 | End: 2020-11-12 | Stop reason: HOSPADM

## 2020-11-09 RX ORDER — SODIUM CHLORIDE 9 MG/ML
INJECTION, SOLUTION INTRAVENOUS CONTINUOUS
Status: DISCONTINUED | OUTPATIENT
Start: 2020-11-09 | End: 2020-11-09 | Stop reason: SDUPTHER

## 2020-11-09 RX ORDER — LIDOCAINE HCL/PF 100 MG/5ML
SYRINGE (ML) INJECTION PRN
Status: DISCONTINUED | OUTPATIENT
Start: 2020-11-09 | End: 2020-11-09 | Stop reason: SDUPTHER

## 2020-11-09 RX ORDER — SCOLOPAMINE TRANSDERMAL SYSTEM 1 MG/1
PATCH, EXTENDED RELEASE TRANSDERMAL PRN
Status: DISCONTINUED | OUTPATIENT
Start: 2020-11-09 | End: 2020-11-09 | Stop reason: SDUPTHER

## 2020-11-09 RX ORDER — DEXAMETHASONE SODIUM PHOSPHATE 4 MG/ML
INJECTION, SOLUTION INTRA-ARTICULAR; INTRALESIONAL; INTRAMUSCULAR; INTRAVENOUS; SOFT TISSUE PRN
Status: DISCONTINUED | OUTPATIENT
Start: 2020-11-09 | End: 2020-11-09 | Stop reason: SDUPTHER

## 2020-11-09 RX ADMIN — METHOCARBAMOL TABLETS 750 MG: 500 TABLET, COATED ORAL at 15:12

## 2020-11-09 RX ADMIN — CEFAZOLIN 2 G: 10 INJECTION, POWDER, FOR SOLUTION INTRAVENOUS at 16:37

## 2020-11-09 RX ADMIN — SODIUM CHLORIDE: 9 INJECTION, SOLUTION INTRAVENOUS at 15:15

## 2020-11-09 RX ADMIN — FENTANYL CITRATE 50 MCG: 50 INJECTION, SOLUTION INTRAMUSCULAR; INTRAVENOUS at 11:36

## 2020-11-09 RX ADMIN — CEFAZOLIN 2 G: 10 INJECTION, POWDER, FOR SOLUTION INTRAVENOUS at 23:25

## 2020-11-09 RX ADMIN — SCOPALAMINE 1 PATCH: 1 PATCH, EXTENDED RELEASE TRANSDERMAL at 09:38

## 2020-11-09 RX ADMIN — HYDROMORPHONE HYDROCHLORIDE 0.5 MG: 1 INJECTION, SOLUTION INTRAMUSCULAR; INTRAVENOUS; SUBCUTANEOUS at 11:27

## 2020-11-09 RX ADMIN — HYDROMORPHONE HYDROCHLORIDE 0.5 MG: 1 INJECTION, SOLUTION INTRAMUSCULAR; INTRAVENOUS; SUBCUTANEOUS at 11:12

## 2020-11-09 RX ADMIN — ROCURONIUM BROMIDE 50 MG: 10 INJECTION INTRAVENOUS at 07:46

## 2020-11-09 RX ADMIN — HYDROCODONE BITARTRATE AND ACETAMINOPHEN 1 TABLET: 5; 325 TABLET ORAL at 11:26

## 2020-11-09 RX ADMIN — HYDROCODONE BITARTRATE AND ACETAMINOPHEN 1 TABLET: 5; 325 TABLET ORAL at 20:40

## 2020-11-09 RX ADMIN — Medication 3 MG: at 10:35

## 2020-11-09 RX ADMIN — HYDROMORPHONE HYDROCHLORIDE 1 MG: 1 INJECTION, SOLUTION INTRAMUSCULAR; INTRAVENOUS; SUBCUTANEOUS at 13:56

## 2020-11-09 RX ADMIN — Medication 1 MG: at 07:47

## 2020-11-09 RX ADMIN — FENTANYL CITRATE 50 MCG: 50 INJECTION, SOLUTION INTRAMUSCULAR; INTRAVENOUS at 11:15

## 2020-11-09 RX ADMIN — METHOCARBAMOL TABLETS 750 MG: 500 TABLET, COATED ORAL at 22:09

## 2020-11-09 RX ADMIN — SODIUM CHLORIDE: 9 INJECTION, SOLUTION INTRAVENOUS at 07:18

## 2020-11-09 RX ADMIN — Medication 100 MG: at 07:46

## 2020-11-09 RX ADMIN — HYDROMORPHONE HYDROCHLORIDE 0.5 MG: 1 INJECTION, SOLUTION INTRAMUSCULAR; INTRAVENOUS; SUBCUTANEOUS at 11:19

## 2020-11-09 RX ADMIN — HYDROMORPHONE HYDROCHLORIDE 1 MG: 1 INJECTION, SOLUTION INTRAMUSCULAR; INTRAVENOUS; SUBCUTANEOUS at 18:05

## 2020-11-09 RX ADMIN — FENTANYL CITRATE 50 MCG: 50 INJECTION, SOLUTION INTRAMUSCULAR; INTRAVENOUS at 11:00

## 2020-11-09 RX ADMIN — CEFAZOLIN 2 G: 10 INJECTION, POWDER, FOR SOLUTION INTRAVENOUS at 07:59

## 2020-11-09 RX ADMIN — DEXAMETHASONE SODIUM PHOSPHATE 4 MG: 4 INJECTION, SOLUTION INTRAMUSCULAR; INTRAVENOUS at 09:33

## 2020-11-09 RX ADMIN — HYDROMORPHONE HYDROCHLORIDE 0.5 MG: 1 INJECTION, SOLUTION INTRAMUSCULAR; INTRAVENOUS; SUBCUTANEOUS at 11:32

## 2020-11-09 RX ADMIN — ONDANSETRON HYDROCHLORIDE 4 MG: 4 INJECTION, SOLUTION INTRAMUSCULAR; INTRAVENOUS at 07:53

## 2020-11-09 RX ADMIN — Medication 10 MG: at 09:35

## 2020-11-09 RX ADMIN — Medication 5 MG: at 09:16

## 2020-11-09 RX ADMIN — SODIUM CHLORIDE: 9 INJECTION, SOLUTION INTRAVENOUS at 07:44

## 2020-11-09 RX ADMIN — Medication 5 MG: at 10:21

## 2020-11-09 RX ADMIN — HYDROMORPHONE HYDROCHLORIDE 0.6 MG: 2 INJECTION INTRAMUSCULAR; INTRAVENOUS; SUBCUTANEOUS at 08:45

## 2020-11-09 RX ADMIN — FENTANYL CITRATE 50 MCG: 50 INJECTION, SOLUTION INTRAMUSCULAR; INTRAVENOUS at 07:46

## 2020-11-09 RX ADMIN — FENTANYL CITRATE 50 MCG: 50 INJECTION, SOLUTION INTRAMUSCULAR; INTRAVENOUS at 11:05

## 2020-11-09 RX ADMIN — ONDANSETRON 4 MG: 2 INJECTION INTRAMUSCULAR; INTRAVENOUS at 18:16

## 2020-11-09 RX ADMIN — Medication 0.6 MG: at 10:35

## 2020-11-09 RX ADMIN — ROCURONIUM BROMIDE 25 MG: 10 INJECTION INTRAVENOUS at 08:57

## 2020-11-09 RX ADMIN — DEXAMETHASONE SODIUM PHOSPHATE 4 MG: 4 INJECTION, SOLUTION INTRAMUSCULAR; INTRAVENOUS at 07:53

## 2020-11-09 RX ADMIN — PROPOFOL 200 MG: 10 INJECTION, EMULSION INTRAVENOUS at 07:46

## 2020-11-09 RX ADMIN — FENTANYL CITRATE 50 MCG: 50 INJECTION, SOLUTION INTRAMUSCULAR; INTRAVENOUS at 07:54

## 2020-11-09 ASSESSMENT — PAIN DESCRIPTION - LOCATION
LOCATION: ABDOMEN
LOCATION: ABDOMEN

## 2020-11-09 ASSESSMENT — PAIN SCALES - GENERAL
PAINLEVEL_OUTOF10: 3
PAINLEVEL_OUTOF10: 6
PAINLEVEL_OUTOF10: 10
PAINLEVEL_OUTOF10: 3
PAINLEVEL_OUTOF10: 3
PAINLEVEL_OUTOF10: 9
PAINLEVEL_OUTOF10: 8
PAINLEVEL_OUTOF10: 8
PAINLEVEL_OUTOF10: 7
PAINLEVEL_OUTOF10: 2
PAINLEVEL_OUTOF10: 8
PAINLEVEL_OUTOF10: 2
PAINLEVEL_OUTOF10: 7
PAINLEVEL_OUTOF10: 8
PAINLEVEL_OUTOF10: 9
PAINLEVEL_OUTOF10: 8
PAINLEVEL_OUTOF10: 8
PAINLEVEL_OUTOF10: 7

## 2020-11-09 ASSESSMENT — PULMONARY FUNCTION TESTS
PIF_VALUE: 17
PIF_VALUE: 17
PIF_VALUE: 16
PIF_VALUE: 16
PIF_VALUE: 15
PIF_VALUE: 15
PIF_VALUE: 17
PIF_VALUE: 16
PIF_VALUE: 13
PIF_VALUE: 15
PIF_VALUE: 16
PIF_VALUE: 17
PIF_VALUE: 23
PIF_VALUE: 24
PIF_VALUE: 22
PIF_VALUE: 15
PIF_VALUE: 5
PIF_VALUE: 2
PIF_VALUE: 16
PIF_VALUE: 16
PIF_VALUE: 22
PIF_VALUE: 17
PIF_VALUE: 16
PIF_VALUE: 17
PIF_VALUE: 17
PIF_VALUE: 16
PIF_VALUE: 13
PIF_VALUE: 16
PIF_VALUE: 14
PIF_VALUE: 6
PIF_VALUE: 16
PIF_VALUE: 14
PIF_VALUE: 17
PIF_VALUE: 17
PIF_VALUE: 5
PIF_VALUE: 17
PIF_VALUE: 21
PIF_VALUE: 17
PIF_VALUE: 16
PIF_VALUE: 17
PIF_VALUE: 17
PIF_VALUE: 16
PIF_VALUE: 17
PIF_VALUE: 15
PIF_VALUE: 17
PIF_VALUE: 16
PIF_VALUE: 17
PIF_VALUE: 14
PIF_VALUE: 1
PIF_VALUE: 17
PIF_VALUE: 16
PIF_VALUE: 17
PIF_VALUE: 16
PIF_VALUE: 1
PIF_VALUE: 17
PIF_VALUE: 15
PIF_VALUE: 17
PIF_VALUE: 16
PIF_VALUE: 17
PIF_VALUE: 18
PIF_VALUE: 22
PIF_VALUE: 16
PIF_VALUE: 17
PIF_VALUE: 14
PIF_VALUE: 18
PIF_VALUE: 22
PIF_VALUE: 16
PIF_VALUE: 22
PIF_VALUE: 17
PIF_VALUE: 14
PIF_VALUE: 16
PIF_VALUE: 17
PIF_VALUE: 16
PIF_VALUE: 2
PIF_VALUE: 1
PIF_VALUE: 17
PIF_VALUE: 16
PIF_VALUE: 16
PIF_VALUE: 23
PIF_VALUE: 16
PIF_VALUE: 22
PIF_VALUE: 1
PIF_VALUE: 17
PIF_VALUE: 24
PIF_VALUE: 17
PIF_VALUE: 16
PIF_VALUE: 17
PIF_VALUE: 16
PIF_VALUE: 15
PIF_VALUE: 18
PIF_VALUE: 16
PIF_VALUE: 14
PIF_VALUE: 24
PIF_VALUE: 17
PIF_VALUE: 22
PIF_VALUE: 17
PIF_VALUE: 14
PIF_VALUE: 17
PIF_VALUE: 17
PIF_VALUE: 16
PIF_VALUE: 1
PIF_VALUE: 16
PIF_VALUE: 16
PIF_VALUE: 17
PIF_VALUE: 24
PIF_VALUE: 16
PIF_VALUE: 18
PIF_VALUE: 15
PIF_VALUE: 17
PIF_VALUE: 31
PIF_VALUE: 17
PIF_VALUE: 17
PIF_VALUE: 29
PIF_VALUE: 17
PIF_VALUE: 16
PIF_VALUE: 1
PIF_VALUE: 16
PIF_VALUE: 22
PIF_VALUE: 17
PIF_VALUE: 16
PIF_VALUE: 16
PIF_VALUE: 17
PIF_VALUE: 16
PIF_VALUE: 17
PIF_VALUE: 16
PIF_VALUE: 0
PIF_VALUE: 16
PIF_VALUE: 17
PIF_VALUE: 21
PIF_VALUE: 15
PIF_VALUE: 17
PIF_VALUE: 15
PIF_VALUE: 17
PIF_VALUE: 16
PIF_VALUE: 17
PIF_VALUE: 15
PIF_VALUE: 16
PIF_VALUE: 17
PIF_VALUE: 16
PIF_VALUE: 18
PIF_VALUE: 17
PIF_VALUE: 17
PIF_VALUE: 16
PIF_VALUE: 1
PIF_VALUE: 17
PIF_VALUE: 16
PIF_VALUE: 16
PIF_VALUE: 17
PIF_VALUE: 16

## 2020-11-09 ASSESSMENT — PAIN - FUNCTIONAL ASSESSMENT
PAIN_FUNCTIONAL_ASSESSMENT: PREVENTS OR INTERFERES SOME ACTIVE ACTIVITIES AND ADLS
PAIN_FUNCTIONAL_ASSESSMENT: 0-10
PAIN_FUNCTIONAL_ASSESSMENT: PREVENTS OR INTERFERES SOME ACTIVE ACTIVITIES AND ADLS

## 2020-11-09 ASSESSMENT — PAIN DESCRIPTION - FREQUENCY
FREQUENCY: CONTINUOUS
FREQUENCY: CONTINUOUS

## 2020-11-09 ASSESSMENT — PAIN DESCRIPTION - ORIENTATION
ORIENTATION: RIGHT
ORIENTATION: RIGHT

## 2020-11-09 ASSESSMENT — PAIN DESCRIPTION - DESCRIPTORS
DESCRIPTORS: SHARP
DESCRIPTORS: SHARP

## 2020-11-09 ASSESSMENT — PAIN DESCRIPTION - PROGRESSION
CLINICAL_PROGRESSION: NOT CHANGED
CLINICAL_PROGRESSION: NOT CHANGED

## 2020-11-09 ASSESSMENT — PAIN DESCRIPTION - ONSET
ONSET: ON-GOING
ONSET: ON-GOING

## 2020-11-09 ASSESSMENT — PAIN DESCRIPTION - PAIN TYPE
TYPE: SURGICAL PAIN
TYPE: SURGICAL PAIN

## 2020-11-09 NOTE — PROGRESS NOTES
ADMITTED TO [facility] AND ORIENTED TO UNIT. SCDS ON. FALL AND ALLERGY BANDS ON. PT VERBALIZED APPROVAL FOR FIRST NAME, LAST INITIAL AND PHYSICIAN NAME ON UNIT WHITEBOARD.

## 2020-11-09 NOTE — ANESTHESIA POSTPROCEDURE EVALUATION
Department of Anesthesiology  Postprocedure Note    Patient: Simran Zarate  MRN: [id number]  YOB: 1970  Date of evaluation: 11/9/2020  Time:  12:24 PM     Procedure Summary     Date:  11/09/20 [facility] / [facility]:  [facility]    Anesthesia Start:  3797 Anesthesia Stop:  2306    Procedures:       BILATERAL BREAST MAMMOPLASTY REDUCTION (Bilateral )      PANNICULECTOMY (N/A Abdomen) Diagnosis:  (S/P BARIATRIC SURGERY, ABDOMINAL PANNUS, MACROMASTIA)    Surgeon:  Pao Rhoades MD Responsible Provider:  Brian Su MD    Anesthesia Type:  general ASA Status:  3          Anesthesia Type: general    Albert Phase I: Albert Score: 9    Albert Phase II:      Last vitals: Reviewed and per EMR flowsheets.        Anesthesia Post Evaluation    Patient location during evaluation: PACU  Patient participation: complete - patient participated  Level of consciousness: awake and alert  Airway patency: patent  Nausea & Vomiting: no nausea  Complications: no  Cardiovascular status: blood pressure returned to baseline and hemodynamically stable  Respiratory status: acceptable and spontaneous ventilation  Hydration status: euvolemic

## 2020-11-09 NOTE — OP NOTE
Operative Note      Patient: Leopold Mori  YOB: 1970  MRN: [id number]    Date of Procedure: 11/9/2020    Pre-Op Diagnosis:   1. S/P BARIATRIC SURGERY  2. ABDOMINAL PANNUS  3. MACROMASTIA    Post-Op Diagnosis: Same       Procedure(s):  BILATERAL BREAST MAMMOPLASTY REDUCTION  PANNICULECTOMY    Surgeon(s):  Roseann Mace MD    Assistant:   GUICHO Garcia    Anesthesia: General    Estimated Blood Loss (mL): less than 628     Complications: None    Specimens:   * No specimens in log *    Implants:  * No implants in log *      Drains: * No LDAs found *    Findings: macromastia and pannus with associated intertriginous rash     Detailed Description of Procedure:   After informed consent was obtained listing risk and benefits of the procedure, the patient was marked in the standing position preoperative holding area for planned bilateral reduction mammoplasty and panniculectomy. The patient received preoperatively IV antibiotics, and she had RORY hose as well as sequential compression devices placed bilateral lower extremities. The patient was brought to the operating suite and placed in supine position and general laryngeal mask anesthesia was initiated. A sterile Rahman catheter was then placed, and then the chest and abdomen were prepped and draped in usual aseptic manner. Alvarez pattern markings were placed on bilateral breast reinforcing the markings that were placed in the preop holding area and a 9 cm wide inferior pedicle was marked out. A 32 mm in diameter template was used to chiquita the nipple areolar complex. The inferior pedicle was then de-epithelialized and elevated using Bovie electrocautery.   Excess skin, fat, breast tissue was then removed from the medial, lateral, and then superior aspect of each breast with a total of 414 g of tissue removed from the right breast, and 423 g of tissue removed from the left breast.  Bleeding points were controlled with cautery and wounds were liberally irrigated with irrisept solution. Closure was then performed with deep sutures of interrupted 2-0 Monocryl, followed by deep dermal sutures of 3-0 Vicryl, and the skin was then closed with a continuous subcuticular 3-0 Mono Derm Quill suture. Dermabond and Prineo mesh were then placed over these incisions. Next attention was turned to the abdomen and the lower abdominal incision was made with dissection taken down through the subcutaneous tissue using Bovie electrocautery and extending through Esther's fascia to the anterior rectus sheath. Dissection was then continued cephalad with circumferential dissection around the umbilicus, and continuing dissection to the xiphoid and costal margin using Bovie electrocautery. Areas were liberally irrigated with irrisept solution, and I placed 19 mm channeled Earle drains x2 coming out through the mons and these were secured with 3-0 nylon suture. Patient was brought into a semirecumbent position, and excess skin and fat was then excised from the lower abdomen to remove the pannus, with a total weight of 2810 g being removed from the abdomen. Closure was then performed with a continuous #2 PDO Quill suture at the level of Esther's fascia, followed by continuous 3-0 Vicryl in the deep dermis, and the skin was then closed with a continuous subcuticular 3-0 Mono Derm Quill suture. The umbilicus was brought through a midline V-shaped incision and secured with deep sutures of interrupted 3-0 Vicryl, followed by continuous subcuticular 4-0 Monocryl. Dermabond and Prineo were then placed to these incisions. Dressings, surgical bra, and abdominal binder were then placed. The patient was extubated without difficulty and sent to recovery room stable condition, she tolerated procedure well.     Electronically signed by Bettie Galeazzi, MD on 11/9/2020 at 7:47 AM

## 2020-11-09 NOTE — ANESTHESIA PRE PROCEDURE
Department of Anesthesiology  Preprocedure Note       Name:  Holden Morley   Age:  52 y.o.  :  1970                                          MRN:  [id number]         Date:  2020      Surgeon: Edy Grant):  Lien Yee MD    Procedure: BILATERAL BREAST MAMMOPLASTY REDUCTION (Bilateral )  PANNICULECTOMY (N/A Abdomen)    Medications prior to admission:   Prior to Admission medications    Medication Sig Start Date End Date Taking? Authorizing Provider   Multiple Vitamins-Minerals (THERAPEUTIC MULTIVITAMIN-MINERALS) tablet Take 1 tablet by mouth daily    Historical Provider, MD   acyclovir (ZOVIRAX) 800 MG tablet Take 800 mg by mouth 2 times daily    Historical Provider, MD   nystatin (MYCOSTATIN) 692817 UNIT/GM cream  20   Historical Provider, MD   traMADol (ULTRAM) 50 MG tablet TAKE 1 TABLET BY MOUTH EVERY 4 TO 6 HOURS AS NEEDED FOR FOOT PAIN 20   Historical Provider, MD   ibuprofen (ADVIL;MOTRIN) 200 MG tablet Take 800 mg by mouth every 8 hours as needed     Historical Provider, MD   Biotin 5000 MCG CAPS Take 1 capsule by mouth daily    Historical Provider, MD       Current medications:    No current facility-administered medications for this visit. No current outpatient medications on file. Facility-Administered Medications Ordered in Other Visits   Medication Dose Route Frequency Provider Last Rate Last Dose    ceFAZolin (ANCEF) 2 g in dextrose 5 % 50 mL IVPB  2 g Intravenous On Call to Lencho Marrero MD        sodium chloride flush 0.9 % injection 10 mL  10 mL Intravenous 2 times per day Lien Yee MD        sodium chloride flush 0.9 % injection 10 mL  10 mL Intravenous PRN Lien Yee MD        0.9 % sodium chloride infusion   Intravenous Continuous Lien Yee MD           Allergies:     Allergies   Allergen Reactions    Celebrex [Celecoxib] Other (See Comments)     Patient states double vision    Toradol [Ketorolac Tromethamine]      Not sure of reaction    CMP:   Lab Results   Component Value Date     08/28/2020    K 4.2 08/28/2020    K 4.0 08/13/2019     08/28/2020    CO2 28 08/28/2020    BUN 18 08/28/2020    CREATININE 0.62 08/28/2020    AGRATIO 1.5 08/28/2020    LABGLOM >90 08/13/2019    GLUCOSE 88 08/28/2020    PROT 7.0 08/28/2020    CALCIUM 9.20 08/28/2020    BILITOT 0.6 08/28/2020    ALKPHOS 71 08/28/2020    AST 16 08/28/2020    ALT 17 08/28/2020       POC Tests: No results for input(s): POCGLU, POCNA, POCK, POCCL, POCBUN, POCHEMO, POCHCT in the last 72 hours. Coags:   Lab Results   Component Value Date    INR 0.93 11/19/2018       HCG (If Applicable): No results found for: PREGTESTUR, PREGSERUM, HCG, HCGQUANT     ABGs: No results found for: PHART, PO2ART, HWH0TXQ, WID6BYI, BEART, Z6JRKWAF     Type & Screen (If Applicable):  No results found for: LABABO, [address]    Anesthesia Evaluation  Patient summary reviewed no history of anesthetic complications:   Airway: Mallampati: III  TM distance: >3 FB   Neck ROM: full  Mouth opening: > = 3 FB Dental:          Pulmonary:Negative Pulmonary ROS and normal exam              Patient did not smoke on day of surgery. Cardiovascular:  Exercise tolerance: good (>4 METS),                     Neuro/Psych:               GI/Hepatic/Renal: Neg GI/Hepatic/Renal ROS  (+) morbid obesity          Endo/Other: Negative Endo/Other ROS             Pt had no PAT visit       Abdominal:           Vascular:                                          Anesthesia Plan      general     ASA 3       Induction: intravenous. MIPS: Postoperative opioids intended and Prophylactic antiemetics administered. Anesthetic plan and risks discussed with patient. Plan discussed with CRNA.                   Daniele Hurtado MD   11/9/2020

## 2020-11-09 NOTE — INTERVAL H&P NOTE
Update History & Physical    The patient's History and Physical of November 3, 2020 was reviewed with the patient and I examined the patient. There was no change. The surgical site was confirmed by the patient and me. Plan: The risks, benefits, expected outcome, and alternative to the recommended procedure have been discussed with the patient. Patient understands and wants to proceed with the procedure.      Electronically signed by Ab Flood MD on 11/9/2020 at 7:25 AM

## 2020-11-09 NOTE — PROGRESS NOTES
1045 patient arrived to PACU, alert and oriented  1100 c/o pain 9/10, medicated with 50 mcg fentanyl  1105 medicated with 50 mcg fentanyl  1112 medicated with 0.5 mg dilaudid  1115 medicated with 50 mcg fentanyl  1119 medicated with 0.5 mg dilaudid  1126 medicated with 1 Norco  1127 medicated with 0.5 mg dilaudid  1132 medicated with 0.5 mg dilaudid  1136 medicated with 50 mcg fentanyl  1145 patient resting off and on.  Wakes up on her own and states pain is \"really bad\" then easily drifts back to sleep  1205 meets criteria for discharge from PACU  1215 transported to [facility]

## 2020-11-10 LAB
HCT VFR BLD CALC: 23.6 % (ref 37–47)
HEMOGLOBIN: 7.5 GM/DL (ref 12–16)

## 2020-11-10 PROCEDURE — 94761 N-INVAS EAR/PLS OXIMETRY MLT: CPT

## 2020-11-10 PROCEDURE — G0378 HOSPITAL OBSERVATION PER HR: HCPCS

## 2020-11-10 PROCEDURE — 6370000000 HC RX 637 (ALT 250 FOR IP): Performed by: SURGERY

## 2020-11-10 PROCEDURE — 36415 COLL VENOUS BLD VENIPUNCTURE: CPT

## 2020-11-10 PROCEDURE — 2580000003 HC RX 258: Performed by: SURGERY

## 2020-11-10 PROCEDURE — 96376 TX/PRO/DX INJ SAME DRUG ADON: CPT

## 2020-11-10 PROCEDURE — 6360000002 HC RX W HCPCS: Performed by: SURGERY

## 2020-11-10 PROCEDURE — 96374 THER/PROPH/DIAG INJ IV PUSH: CPT

## 2020-11-10 PROCEDURE — 85018 HEMOGLOBIN: CPT

## 2020-11-10 PROCEDURE — 85014 HEMATOCRIT: CPT

## 2020-11-10 RX ORDER — SODIUM CHLORIDE, SODIUM LACTATE, POTASSIUM CHLORIDE, AND CALCIUM CHLORIDE .6; .31; .03; .02 G/100ML; G/100ML; G/100ML; G/100ML
500 INJECTION, SOLUTION INTRAVENOUS ONCE
Status: COMPLETED | OUTPATIENT
Start: 2020-11-10 | End: 2020-11-10

## 2020-11-10 RX ORDER — HYDROCODONE BITARTRATE AND ACETAMINOPHEN 5; 325 MG/1; MG/1
2 TABLET ORAL EVERY 4 HOURS PRN
Status: DISCONTINUED | OUTPATIENT
Start: 2020-11-10 | End: 2020-11-12 | Stop reason: HOSPADM

## 2020-11-10 RX ADMIN — SODIUM CHLORIDE, POTASSIUM CHLORIDE, SODIUM LACTATE AND CALCIUM CHLORIDE 500 ML: 600; 310; 30; 20 INJECTION, SOLUTION INTRAVENOUS at 10:18

## 2020-11-10 RX ADMIN — METHOCARBAMOL TABLETS 750 MG: 500 TABLET, COATED ORAL at 09:10

## 2020-11-10 RX ADMIN — HYDROCODONE BITARTRATE AND ACETAMINOPHEN 2 TABLET: 5; 325 TABLET ORAL at 17:25

## 2020-11-10 RX ADMIN — HYDROMORPHONE HYDROCHLORIDE 1 MG: 1 INJECTION, SOLUTION INTRAMUSCULAR; INTRAVENOUS; SUBCUTANEOUS at 21:05

## 2020-11-10 RX ADMIN — HYDROMORPHONE HYDROCHLORIDE 1 MG: 1 INJECTION, SOLUTION INTRAMUSCULAR; INTRAVENOUS; SUBCUTANEOUS at 13:06

## 2020-11-10 RX ADMIN — HYDROCODONE BITARTRATE AND ACETAMINOPHEN 1 TABLET: 5; 325 TABLET ORAL at 07:53

## 2020-11-10 RX ADMIN — METHOCARBAMOL TABLETS 750 MG: 500 TABLET, COATED ORAL at 14:03

## 2020-11-10 RX ADMIN — SODIUM CHLORIDE, POTASSIUM CHLORIDE, SODIUM LACTATE AND CALCIUM CHLORIDE 500 ML: 600; 310; 30; 20 INJECTION, SOLUTION INTRAVENOUS at 01:04

## 2020-11-10 RX ADMIN — ONDANSETRON 4 MG: 2 INJECTION INTRAMUSCULAR; INTRAVENOUS at 07:53

## 2020-11-10 RX ADMIN — METHOCARBAMOL TABLETS 750 MG: 500 TABLET, COATED ORAL at 23:05

## 2020-11-10 RX ADMIN — Medication 10 ML: at 21:05

## 2020-11-10 RX ADMIN — SODIUM CHLORIDE: 9 INJECTION, SOLUTION INTRAVENOUS at 07:57

## 2020-11-10 RX ADMIN — HYDROCODONE BITARTRATE AND ACETAMINOPHEN 2 TABLET: 5; 325 TABLET ORAL at 23:04

## 2020-11-10 RX ADMIN — SODIUM CHLORIDE: 9 INJECTION, SOLUTION INTRAVENOUS at 18:22

## 2020-11-10 RX ADMIN — HYDROMORPHONE HYDROCHLORIDE 1 MG: 1 INJECTION, SOLUTION INTRAMUSCULAR; INTRAVENOUS; SUBCUTANEOUS at 16:11

## 2020-11-10 ASSESSMENT — PAIN SCALES - GENERAL
PAINLEVEL_OUTOF10: 7
PAINLEVEL_OUTOF10: 6
PAINLEVEL_OUTOF10: 6
PAINLEVEL_OUTOF10: 8
PAINLEVEL_OUTOF10: 9
PAINLEVEL_OUTOF10: 8
PAINLEVEL_OUTOF10: 9
PAINLEVEL_OUTOF10: 9
PAINLEVEL_OUTOF10: 0
PAINLEVEL_OUTOF10: 5
PAINLEVEL_OUTOF10: 7
PAINLEVEL_OUTOF10: 6

## 2020-11-10 ASSESSMENT — PAIN DESCRIPTION - PROGRESSION: CLINICAL_PROGRESSION: NOT CHANGED

## 2020-11-10 ASSESSMENT — PAIN DESCRIPTION - PAIN TYPE
TYPE: SURGICAL PAIN
TYPE: SURGICAL PAIN

## 2020-11-10 ASSESSMENT — PAIN DESCRIPTION - FREQUENCY: FREQUENCY: CONTINUOUS

## 2020-11-10 ASSESSMENT — PAIN - FUNCTIONAL ASSESSMENT: PAIN_FUNCTIONAL_ASSESSMENT: PREVENTS OR INTERFERES SOME ACTIVE ACTIVITIES AND ADLS

## 2020-11-10 ASSESSMENT — PAIN DESCRIPTION - LOCATION: LOCATION: GROIN

## 2020-11-10 ASSESSMENT — PAIN DESCRIPTION - DESCRIPTORS: DESCRIPTORS: SHARP

## 2020-11-10 ASSESSMENT — PAIN DESCRIPTION - ORIENTATION: ORIENTATION: RIGHT;LEFT

## 2020-11-10 ASSESSMENT — PAIN DESCRIPTION - ONSET: ONSET: ON-GOING

## 2020-11-10 NOTE — CARE COORDINATION
DISCHARGE PLANNING EVALUATION: OP/OBSERVATION        11/10/20, 10:51 AM KENNEDY Dillon       Admitted from: PACU 11/9/2020/ 3994   Location: [address] [place] [address]-A Reason for admit: Abdominal pannus [E65]  Macromastia [N62]   Admit order signed?: yes    Procedure: 11/09/2020 BILATERAL BREAST MAMMOPLASTY REDUCTION  PANNICULECTOMY    Pertinent Info/Orders/Treatment Plan: H/H 7.5/23. 6. IV fluids, Hespan, Robaxin, Ancef complete, prn Tylenol, Norco, Dilaudid, MOM, Phenergan or Zofran, H/H daily, abdominal binder, ambulate, wound and drain (2) care, SCD's, surgical bra, remove maxwell catheter. PCP: ANJEL Teixeira - CNP    Patient Goals/Plan/Treatment Preferences: Met with Brievi Yoon. She is from home alone. Brievi Yoon verifies her insurance and PCP. She will have transportation to home at discharge. She ambulates independently and denies a need for DME. Brievi Yoon requests [facility] at discharge for skilled nursing services. List of agencies provided to her. Her preference was to use [facility]. Phone call made, spoke to Alhaji Odonnell. Alhaji Odonnell states they are not in network however [facility] does show listed on [facility]'s provider list. [facility] to get back with this writer. Transportation/Food Security/Housekeeping Addressed:  No issues identified.

## 2020-11-10 NOTE — CARE COORDINATION
Patient's second choice for [facility] is [facility]. They are in network with [facility]. Referral made, spoke to [facility]. Requested documentation faxed. Note placed on chart for orders.    Electronically signed by Aime Kulkarni RN on 11/10/20 at 2:43 PM EST

## 2020-11-10 NOTE — PROGRESS NOTES
Contacted Dr. Fer Pratt about patients pain and low BP (90/49). Instructed to give patient IV dilaudid. Will continue to monitor vital signs.

## 2020-11-10 NOTE — FLOWSHEET NOTE
11/09/20 2323   Provider Notification   Reason for Communication Evaluate   Provider Name Dr. Bush Memory   Provider Notification Physician   Method of Communication Secure Message   Response See orders   Notification Time    Notified of patients current blood pressure, patient stating they are in a lot of pain and feeling nauseated. Also notified of patients LUIS DANIEL drain site having a moderate amount of drainage. 2326: provider ordered to give hespan and to place a pressure dressing over the luis daniel drain site. 80: messaged to notify of current blood pressure. Rapid response nurse was rounding and asked if hespan could be switched to a bolus.    0574: provider switched to an LR bolus and ordered a hemoglobin and hematocrit for the AM

## 2020-11-11 LAB
ABO: NORMAL
ANTIBODY SCREEN: NORMAL
HCT VFR BLD CALC: 16.7 % (ref 37–47)
HCT VFR BLD CALC: 24.5 % (ref 37–47)
HEMOGLOBIN: 5.1 GM/DL (ref 12–16)
HEMOGLOBIN: 8 GM/DL (ref 12–16)
RH FACTOR: NORMAL

## 2020-11-11 PROCEDURE — G0378 HOSPITAL OBSERVATION PER HR: HCPCS

## 2020-11-11 PROCEDURE — 94760 N-INVAS EAR/PLS OXIMETRY 1: CPT

## 2020-11-11 PROCEDURE — 85014 HEMATOCRIT: CPT

## 2020-11-11 PROCEDURE — 86923 COMPATIBILITY TEST ELECTRIC: CPT

## 2020-11-11 PROCEDURE — 1200000000 HC SEMI PRIVATE

## 2020-11-11 PROCEDURE — 36415 COLL VENOUS BLD VENIPUNCTURE: CPT

## 2020-11-11 PROCEDURE — P9016 RBC LEUKOCYTES REDUCED: HCPCS

## 2020-11-11 PROCEDURE — 86850 RBC ANTIBODY SCREEN: CPT

## 2020-11-11 PROCEDURE — 2580000003 HC RX 258: Performed by: SURGERY

## 2020-11-11 PROCEDURE — 36430 TRANSFUSION BLD/BLD COMPNT: CPT

## 2020-11-11 PROCEDURE — 86900 BLOOD TYPING SEROLOGIC ABO: CPT

## 2020-11-11 PROCEDURE — 99024 POSTOP FOLLOW-UP VISIT: CPT | Performed by: SURGERY

## 2020-11-11 PROCEDURE — 85018 HEMOGLOBIN: CPT

## 2020-11-11 PROCEDURE — 6370000000 HC RX 637 (ALT 250 FOR IP): Performed by: SURGERY

## 2020-11-11 PROCEDURE — 86901 BLOOD TYPING SEROLOGIC RH(D): CPT

## 2020-11-11 RX ORDER — 0.9 % SODIUM CHLORIDE 0.9 %
20 INTRAVENOUS SOLUTION INTRAVENOUS ONCE
Status: COMPLETED | OUTPATIENT
Start: 2020-11-11 | End: 2020-11-11

## 2020-11-11 RX ADMIN — METHOCARBAMOL TABLETS 750 MG: 500 TABLET, COATED ORAL at 16:15

## 2020-11-11 RX ADMIN — HYDROCODONE BITARTRATE AND ACETAMINOPHEN 2 TABLET: 5; 325 TABLET ORAL at 05:26

## 2020-11-11 RX ADMIN — METHOCARBAMOL TABLETS 750 MG: 500 TABLET, COATED ORAL at 10:36

## 2020-11-11 RX ADMIN — Medication 10 ML: at 22:49

## 2020-11-11 RX ADMIN — SODIUM CHLORIDE 20 ML: 9 INJECTION, SOLUTION INTRAVENOUS at 10:46

## 2020-11-11 RX ADMIN — SODIUM CHLORIDE: 9 INJECTION, SOLUTION INTRAVENOUS at 16:24

## 2020-11-11 RX ADMIN — ACETAMINOPHEN 650 MG: 325 TABLET ORAL at 13:50

## 2020-11-11 ASSESSMENT — PAIN SCALES - GENERAL
PAINLEVEL_OUTOF10: 0
PAINLEVEL_OUTOF10: 4
PAINLEVEL_OUTOF10: 0
PAINLEVEL_OUTOF10: 3

## 2020-11-11 ASSESSMENT — VISUAL ACUITY: OU: 1

## 2020-11-11 NOTE — PROGRESS NOTES
Plastic Surgery Progress Note    Patient:  Indy Daniel      Unit/Bed:5K-03/003-A    YOB: 1970    MRN: [id number]       Acct: [de-identified]     PCP: ANJEL Montoya CNP    Date of Admission: 11/9/2020    Assessment/Plan:    1. POD#2 from bilateral reduction mammoplasty and panniculectomy following massive weight loss    Noted drop in Hgb/Hct postoperatively and orders placed for transfusion. Expected discharge date:  tbd    Hospital Course: postoperatively she has had pain control issues with occassional systolic BP into 99-73 without tachycardia or diaphoresis. She has responded to fluid bolus. Noted drop in Hgb/Hct this morning and she is being type and crossed for transfusion of 2U PRBC's and then will recheck following transfusion. Subjective (past 24 hours): She is sitting up in bed eating breakfast and states she does feel better and her pain is under better control. She denies fever, chills, nausea, or vomiting. She denies light headedness or dizziness. She states she has been OOB to chair and ambulated to the bathroom a few times. Medications:  Reviewed    Infusion Medications    sodium chloride 125 mL/hr at 11/10/20 1822    hetastarch 6% in 0.9% NaCl infusion 500 mL Stopped (11/10/20 0128)     Scheduled Medications    sodium chloride flush  10 mL Intravenous 2 times per day    methocarbamol  750 mg Oral TID     PRN Meds: HYDROcodone-acetaminophen, sodium chloride flush, promethazine **OR** ondansetron, acetaminophen, magnesium hydroxide, HYDROmorphone      Intake/Output Summary (Last 24 hours) at 11/11/2020 0843  Last data filed at 11/11/2020 0524  Gross per 24 hour   Intake 1731.5 ml   Output 930 ml   Net 801.5 ml       Diet:  DIET CARB CONTROL;     Exam:  BP (!) 108/52   Pulse 97   Temp 99.4 °F (37.4 °C) (Oral)   Resp 16   Ht 4' 11.5\" (1.511 m)   Wt 168 lb (76.2 kg)   SpO2 92%   BMI 33.36 kg/m²   Physical Exam  Vitals signs and nursing note reviewed. Exam conducted with a chaperone present. Constitutional:       General: She is awake. She is not in acute distress. Appearance: Normal appearance. She is well-developed and well-groomed. HENT:      Head: Normocephalic and atraumatic. Jaw: There is normal jaw occlusion. Right Ear: External ear normal.      Left Ear: External ear normal.      Nose: Nose normal.      Mouth/Throat:      Mouth: Mucous membranes are moist.      Pharynx: Oropharynx is clear. Eyes:      General: Lids are normal. Vision grossly intact. Right eye: No discharge. Left eye: No discharge. Extraocular Movements: Extraocular movements intact. Conjunctiva/sclera: Conjunctivae normal.      Pupils: Pupils are equal, round, and reactive to light. Neck:      Musculoskeletal: Full passive range of motion without pain and normal range of motion. Thyroid: No thyromegaly. Trachea: Trachea and phonation normal. No tracheal deviation. Cardiovascular:      Rate and Rhythm: Normal rate. Pulses: Normal pulses. No decreased pulses. Pulmonary:      Effort: Pulmonary effort is normal. No respiratory distress. Chest:       Abdominal:      General: Abdomen is flat. A surgical scar is present. Bowel sounds are normal. There is no distension. Palpations: Abdomen is soft. Tenderness: There is no abdominal tenderness. Musculoskeletal: Normal range of motion. General: No deformity. Skin:     General: Skin is warm. Capillary Refill: Capillary refill takes 2 to 3 seconds. Findings: No erythema or rash. Neurological:      General: No focal deficit present. Mental Status: She is alert and oriented to person, place, and time. Mental status is at baseline. Sensory: No sensory deficit. Psychiatric:         Mood and Affect: Mood normal.         Behavior: Behavior normal. Behavior is cooperative. Thought Content:  Thought content normal. Judgment: Judgment normal.            Labs:   Recent Labs     11/10/20  0634 11/11/20  0624   HGB 7.5* 5.1*   HCT 23.6* 16.7*     No results for input(s): NA, K, CL, CO2, BUN, CREATININE, CALCIUM, PHOS in the last 72 hours. Invalid input(s): MAGNES  No results for input(s): AST, ALT, BILIDIR, BILITOT, ALKPHOS in the last 72 hours. No results for input(s): INR in the last 72 hours. No results for input(s): Joyice [place] in the last 72 hours.     Microbiology:      Urinalysis:    No results found for: Zee Díaz, BACTERIA, [address] [place] [address], [place] [address], [place] [address]    Radiology:  No orders to display       DVT prophylaxis: [] Lovenox                                 [x] SCDs                                 [] SQ Heparin                                 [x] Encourage ambulation           [] Already on Anticoagulation     Code Status: Full Code      Active Hospital Problems    Diagnosis Date Noted    Abdominal pannus [E65] 11/09/2020    [facility]       Electronically signed by Meche Brower MD on 11/11/2020 at 8:43 AM

## 2020-11-12 VITALS
BODY MASS INDEX: 32.98 KG/M2 | DIASTOLIC BLOOD PRESSURE: 64 MMHG | TEMPERATURE: 98.1 F | OXYGEN SATURATION: 96 % | RESPIRATION RATE: 16 BRPM | SYSTOLIC BLOOD PRESSURE: 131 MMHG | WEIGHT: 168 LBS | HEART RATE: 88 BPM | HEIGHT: 60 IN

## 2020-11-12 LAB
HCT VFR BLD CALC: 22.4 % (ref 37–47)
HEMOGLOBIN: 7.4 GM/DL (ref 12–16)

## 2020-11-12 PROCEDURE — 85014 HEMATOCRIT: CPT

## 2020-11-12 PROCEDURE — 85018 HEMOGLOBIN: CPT

## 2020-11-12 PROCEDURE — 99024 POSTOP FOLLOW-UP VISIT: CPT | Performed by: SURGERY

## 2020-11-12 PROCEDURE — 2580000003 HC RX 258: Performed by: SURGERY

## 2020-11-12 PROCEDURE — 6370000000 HC RX 637 (ALT 250 FOR IP): Performed by: SURGERY

## 2020-11-12 PROCEDURE — 36415 COLL VENOUS BLD VENIPUNCTURE: CPT

## 2020-11-12 RX ORDER — HYDROCODONE BITARTRATE AND ACETAMINOPHEN 5; 325 MG/1; MG/1
1 TABLET ORAL EVERY 4 HOURS PRN
Qty: 25 TABLET | Refills: 0 | Status: SHIPPED | OUTPATIENT
Start: 2020-11-12 | End: 2020-11-15

## 2020-11-12 RX ORDER — METHOCARBAMOL 750 MG/1
750 TABLET, FILM COATED ORAL 3 TIMES DAILY
Qty: 90 TABLET | Refills: 0 | Status: SHIPPED | OUTPATIENT
Start: 2020-11-12 | End: 2020-11-22

## 2020-11-12 RX ADMIN — SODIUM CHLORIDE: 9 INJECTION, SOLUTION INTRAVENOUS at 00:21

## 2020-11-12 RX ADMIN — METHOCARBAMOL TABLETS 750 MG: 500 TABLET, COATED ORAL at 11:10

## 2020-11-12 RX ADMIN — METHOCARBAMOL TABLETS 750 MG: 500 TABLET, COATED ORAL at 16:40

## 2020-11-12 RX ADMIN — SODIUM CHLORIDE: 9 INJECTION, SOLUTION INTRAVENOUS at 08:34

## 2020-11-12 ASSESSMENT — PAIN DESCRIPTION - PROGRESSION
CLINICAL_PROGRESSION: GRADUALLY IMPROVING
CLINICAL_PROGRESSION: GRADUALLY IMPROVING

## 2020-11-12 ASSESSMENT — PAIN SCALES - GENERAL
PAINLEVEL_OUTOF10: 3
PAINLEVEL_OUTOF10: 3

## 2020-11-12 ASSESSMENT — PAIN DESCRIPTION - ONSET: ONSET: ON-GOING

## 2020-11-12 ASSESSMENT — PAIN DESCRIPTION - ORIENTATION: ORIENTATION: RIGHT;LEFT

## 2020-11-12 ASSESSMENT — PAIN DESCRIPTION - DESCRIPTORS: DESCRIPTORS: SHARP

## 2020-11-12 ASSESSMENT — PAIN - FUNCTIONAL ASSESSMENT: PAIN_FUNCTIONAL_ASSESSMENT: ACTIVITIES ARE NOT PREVENTED

## 2020-11-12 ASSESSMENT — PAIN DESCRIPTION - FREQUENCY: FREQUENCY: INTERMITTENT

## 2020-11-12 ASSESSMENT — PAIN DESCRIPTION - PAIN TYPE: TYPE: SURGICAL PAIN

## 2020-11-12 ASSESSMENT — VISUAL ACUITY: OU: 1

## 2020-11-12 ASSESSMENT — PAIN DESCRIPTION - LOCATION: LOCATION: GROIN

## 2020-11-12 NOTE — DISCHARGE INSTR - DIET

## 2020-11-12 NOTE — DISCHARGE INSTR - COC
Continuity of Care Form    Patient Name: Kimberly Mccarty   :  1970  MRN:  [id number]    Admit date:  2020  Discharge date:  ***    Code Status Order: Full Code   Advance Directives:   Advance Care Flowsheet Documentation     Date/Time Healthcare Directive Type of Healthcare Directive Copy in [address] Agent's Name Healthcare Agent's Phone Number    20 2683  No, patient does not have an advance directive for healthcare treatment -- -- -- -- --    20 1212  No, patient does not have an advance directive for healthcare treatment -- -- -- -- --          Admitting Physician:  Beulah Macias MD  PCP: ANJEL Roberts CNP    Discharging Nurse: [facility] Unit/Room#: 5K-03003-A  Discharging Unit Phone Number: ***    Emergency Contact:   Extended Emergency Contact Information  Primary Emergency Contact: Justine Pineda [address] Phone: [phone]  Mobile Phone: [phone]  Relation: Parent  Secondary Emergency Contact: Eric Goldman [address] Phone: [phone]  Relation: Aunt/Uncle    Past Surgical History:  Past Surgical History:   Procedure Laterality Date    ANKLE SURGERY Left     ANKLE SURGERY Right 2019    plate and screws    BREAST REDUCTION SURGERY Bilateral 2020    BILATERAL BREAST MAMMOPLASTY REDUCTION performed by Beulah Macias MD at [place] [address], TOTAL ABDOMINAL      HYSTEROPLASTY      LIPECTOMY N/A 2020    PANNICULECTOMY performed by Beulah Macias MD at [place] [address] 2019    ROBOTIC SLEEVE GASTRECTOMY performed by Javed Saavedra MD at [place] [address] 2019    EGD WITH BX performed by Comfort Bermeo MD at [address] [place] [address] Right     plate and screws       Immunization History:      There is no immunization history on file for this patient. 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    Abdominal pannus E65    Macromastia N62       Isolation/Infection:   Isolation          No Isolation        Patient Infection Status     Infection Onset Added Last Indicated Last Indicated By Review Planned Expiration Resolved Resolved By    None active    Resolved    COVID-19 Rule Out 11/03/20 11/03/20 11/03/20 Covid-19 Ambulatory (Ordered)   11/05/20 Rule-Out Test Resulted          Nurse Assessment:  Last Vital Signs: /64   Pulse 88   Temp 98.1 °F (36.7 °C) (Oral)   Resp 16   Ht 4' 11.5\" (1.511 m)   Wt 168 lb (76.2 kg)   SpO2 96%   BMI 33.36 kg/m²     Last documented pain score (0-10 scale): Pain Level: 3  Last Weight:   Wt Readings from Last 1 Encounters:   11/09/20 168 lb (76.2 kg)     Mental Status:  {IP PT MENTAL STATUS:20030}    IV Access:  { EMETERIO IV ACCESS:668184582}    Nursing Mobility/ADLs:  Walking   {CHP DME BCAY:417497399}  Transfer  {CHP DME QLZY:480207828}  Bathing  {CHP DME EISN:220947621}  Dressing  {CHP DME MNGA:610734103}  Toileting  {CHP DME TGBW:191696916}  Feeding  {P DME LOLF:798913078}  Med Admin  {P DME KTMO:778228603}  Med Delivery   { EMETERIO MED Delivery:734616592}    Wound Care Documentation and Therapy:        Elimination:  Continence:   · Bowel: {YES / AX:15791}  · Bladder: {YES / BK:72243}  Urinary Catheter: {Urinary Catheter:270971367}   Colostomy/Ileostomy/Ileal Conduit: {YES / JT:51900}       Date of Last BM: ***    Intake/Output Summary (Last 24 hours) at 11/12/2020 1629  Last data filed at 11/12/2020 1400  Gross per 24 hour   Intake 6200.8 ml   Output 300 ml   Net 5900.8 ml     I/O last 3 completed shifts: In: 6510.8 [P.O.:430; I.V.:5770.8;  Blood:310]  Out: 301 [Urine:1; Drains:300]    Safety Concerns:     [address] [facility] Safety Concerns:839386165}    Impairments/Disabilities:      [address] [facility] Impairments/Disabilities:988687757}    Nutrition Therapy:  Current Nutrition Therapy:   [address] [facility] Diet List:631695285}    Routes of Feeding: {CHP DME Other Feedings:179258406}  Liquids: {Slp liquid thickness:33687}  Daily Fluid Restriction: {CHP DME Yes amt example:193111147}  Last Modified Barium Swallow with Video (Video Swallowing Test): {Done Not Done AJEB:279488214}    Treatments at the Time of Hospital Discharge:   Respiratory Treatments: ***  Oxygen Therapy:  {Therapy; copd oxygen:03686}  Ventilator:    { CC Vent LLYQ:555896842}    Rehab Therapies: {THERAPEUTIC INTERVENTION:3878952477}  Weight Bearing Status/Restrictions: [address]  Weight Bearin}  Other Medical Equipment (for information only, NOT a DME order):  {EQUIPMENT:593087873}  Other Treatments: ***    Patient's personal belongings (please select all that are sent with patient):  {P DME Belongings:086436882}    RN SIGNATURE:  {Esignature:043202038}    CASE MANAGEMENT/SOCIAL WORK SECTION    Inpatient Status Date: ***    Readmission Risk Assessment Score:  Readmission Risk              Risk of Unplanned Readmission:        5           Discharging to Facility/ Agency   · Name:   · Address:  · Phone:  · Fax:    Dialysis Facility (if applicable)   · Name:  · Address:  · Dialysis Schedule:  · Phone:  · Fax:    / signature: {Esignature:011990544}    PHYSICIAN SECTION    Prognosis: {Prognosis:1541747803}    Condition at Discharge: [address] Patient Condition:672333666}    Rehab Potential (if transferring to Rehab): {Prognosis:5050755427}    Recommended Labs or Other Treatments After Discharge: ***    Physician Certification: I certify the above information and transfer of Chares Dubin  is necessary for the continuing treatment of the diagnosis listed and that she requires {Admit to Appropriate Level of Care:27599} for {GREATER/LESS:350954229} 30 days.      Update Admission H&P: {CHP DME Changes in TWQUR:171305280}    PHYSICIAN SIGNATURE:  {Esignature:681710260}

## 2020-11-12 NOTE — PROGRESS NOTES
Plastic Surgery Progress Note    Patient:  Leopold Mori      Unit/Bed:5K-03/003-A    YOB: 1970    MRN: [id number]       Acct: [de-identified]     PCP: ANJEL Sheikh CNP    Date of Admission: 11/9/2020    Assessment/Plan:    1. POD#3 from bilateral reduction mammoplasty and panniculectomy    Doing very well, wants to go home  She has remained stable following blood transfusion. Her pain is well controlled with po pain meds        Expected discharge date:  11/12/20    Hospital Course: tolerated procedure well, postoperative anemia noted and treated with blood transfusion with significant improvement     Subjective (past 24 hours): no complaints, wants to go home, she is ambulating on her own in the room and roldan, pain controlled. Medications:  Reviewed    Infusion Medications    sodium chloride 125 mL/hr at 11/12/20 0834    hetastarch 6% in 0.9% NaCl infusion 500 mL Stopped (11/10/20 0128)     Scheduled Medications    sodium chloride flush  10 mL Intravenous 2 times per day    methocarbamol  750 mg Oral TID     PRN Meds: HYDROcodone-acetaminophen, sodium chloride flush, promethazine **OR** ondansetron, acetaminophen, magnesium hydroxide, HYDROmorphone      Intake/Output Summary (Last 24 hours) at 11/12/2020 1536  Last data filed at 11/12/2020 1400  Gross per 24 hour   Intake 6510.8 ml   Output 301 ml   Net 6209.8 ml       Diet:  DIET CARB CONTROL; Exam:  /64   Pulse 88   Temp 98.1 °F (36.7 °C) (Oral)   Resp 16   Ht 4' 11.5\" (1.511 m)   Wt 168 lb (76.2 kg)   SpO2 96%   BMI 33.36 kg/m²   Physical Exam  Vitals signs and nursing note reviewed. Exam conducted with a chaperone present. Constitutional:       General: She is awake. She is not in acute distress. Appearance: Normal appearance. HENT:      Head: Normocephalic and atraumatic. Jaw: There is normal jaw occlusion.       Right Ear: External ear normal.      Left Ear: External ear normal.      Nose: Nose normal.      Mouth/Throat:      Lips: Pink. Mouth: Mucous membranes are moist.      Pharynx: Oropharynx is clear. Eyes:      General: Lids are normal. Vision grossly intact. Right eye: No discharge. Left eye: No discharge. Extraocular Movements: Extraocular movements intact. Conjunctiva/sclera: Conjunctivae normal.      Pupils: Pupils are equal, round, and reactive to light. Neck:      Musculoskeletal: Full passive range of motion without pain, normal range of motion and neck supple. Thyroid: No thyromegaly. Trachea: Trachea and phonation normal. No tracheal deviation. Cardiovascular:      Rate and Rhythm: Normal rate. Pulses: Normal pulses. No decreased pulses. Pulmonary:      Effort: Pulmonary effort is normal. No respiratory distress. Chest:       Abdominal:      General: Abdomen is flat. There is no distension. Palpations: Abdomen is soft. Tenderness: There is no abdominal tenderness. Musculoskeletal: Normal range of motion. General: No deformity. Skin:     General: Skin is warm and dry. Capillary Refill: Capillary refill takes 2 to 3 seconds. Findings: No erythema or rash. Neurological:      General: No focal deficit present. Mental Status: She is alert and oriented to person, place, and time. Mental status is at baseline. Sensory: No sensory deficit. Psychiatric:         Mood and Affect: Mood normal.         Behavior: Behavior normal. Behavior is cooperative. Thought Content: Thought content normal.         Judgment: Judgment normal.            Labs:   Recent Labs     11/11/20  0624 11/11/20  1719 11/12/20  0706   HGB 5.1* 8.0* 7.4*   HCT 16.7* 24.5* 22.4*     No results for input(s): NA, K, CL, CO2, BUN, CREATININE, CALCIUM, PHOS in the last 72 hours. Invalid input(s): MAGNES  No results for input(s): AST, ALT, BILIDIR, BILITOT, ALKPHOS in the last 72 hours.   No results for input(s): INR in the last 72 hours. No results for input(s): Alcon Ferrarialo in the last 72 hours.     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

## 2020-11-12 NOTE — PROGRESS NOTES
Patient discharged home with 2 RUSLAN drains. 1 in Rt, 1 in lt groin area. Home health following up. Discharge instructions given. Patient verbalized understanding, denies needs. Escorted to car per w/c per transport team with belongings.  See AVS.

## 2020-11-12 NOTE — PROGRESS NOTES
Physician Progress Note      PATIENT:               Poornima Melvin  CSN #:                  [id number]  :                       1970  ADMIT DATE:       2020 6:41 AM  [address] DATE:  RESPONDING  PROVIDER #:        Danny Howell MD          QUERY TEXT:    Dr Samuel Ash,    Pt admitted for breast reduction and underwent Bilateral reduction   mammoplasty. Following extubation pt required 6LNC titrating to RA quickly-   just to be placed back on 3LNC to maintain SPO2>93% and slowly titrated to RA   over 24 hours following extubation. If possible, please document in the   progress notes and discharge summary if you are evaluating and/or treating any   of the following: The medical record reflects the following:  Risk Factors: bariatric surgery  Clinical Indicators:  1045 extubated in PACU placed on 6LNC-quickly   titrated to RA, Shortly after SPO2 90% and placed on 3LNC. Reached 92%   titrated to 4LNC to maintain 94%. Treatment: application of supplemental 02 to maintain SPO2 >93% for greater   than 24 hours following extubation.  1045 extubated and placed on 6LNC.   1105 RA 97%. 1135 90% placed on 3LNC. 1200 92% 4LNC. 2300 99% 3LNC. 11/10 100%   2LNC. 0745 1LNC 99%. 1217 95% RA. Options provided:  -- Acute Post Op pulmonary insufficiency  -- 02 for comfort >24 hours to maintain SPO2>93% following extubation  -- Prolonged emergence from general anesthesia  -- Other - I will add my own diagnosis  -- Disagree - Not applicable / Not valid  -- Disagree - Clinically unable to determine / Unknown  -- Refer to Clinical Documentation Reviewer    PROVIDER RESPONSE TEXT:    02 for comfort >24 hours to maintain SPO2>93% following extubation. Query created by: Supriya Mccrary on 2020 6:49 AM      QUERY TEXT:    Dr Samuel Ash,    Pt admitted for breast reduction. Pt noted to have\"drop in Hgb/Hct this   morning and she is being type and crossed for transfusion of 2U PRBC's\".  If   possible, please

## 2020-11-12 NOTE — DISCHARGE SUMMARY
Hospital Medicine Discharge Summary      Patient Identification:   Izzy Sebastian   : 1970  MRN: [id number]   Account: [de-identified]      Patient's PCP: ANJEL Browning CNP    Admit Date: 2020     Discharge Date:   20    Admitting Physician: Cj Martinez MD     Discharge Physician: Cj Martinez MD     Discharge Diagnoses: Active Hospital Problems    Diagnosis Date Noted    Abdominal pannus [E65] 2020    Macromastia [N62] 2020       The patient was seen and examined on day of discharge and this discharge summary is in conjunction with any daily progress note from day of discharge. Hospital Course:   Izzy Sebastian is a 52 y.o. female admitted to [facility] on 2020 for bilateral reduction mammoplasty and panniculectomy. She tolerated the procedures well and postoperatively required 2 unit blood transfusion secondary to anemia from blood loss. She remained stable and began ambulating, had good pain control, stable vital signs. She was discharged home on the third postoperative day. Exam:     Vitals:  Vitals:    20 0400 20 0830 20 1230 20 1400   BP: (!) 124/57 123/70 132/79 131/64   Pulse: 60 79 86 88   Resp: 16 16 16 16   Temp: 98.4 °F (36.9 °C) 98.2 °F (36.8 °C) 98.1 °F (36.7 °C) 98.1 °F (36.7 °C)   TempSrc: Oral Oral Oral Oral   SpO2: 92% 97% 96% 96%   Weight:       Height:         Weight: Weight: 168 lb (76.2 kg)     24 hour intake/output:    Intake/Output Summary (Last 24 hours) at 2020 1531  Last data filed at 2020 1400  Gross per 24 hour   Intake 6510.8 ml   Output 301 ml   Net 6209.8 ml         General appearance:  No apparent distress, appears stated age and cooperative. HEENT:  Normal cephalic, atraumatic without obvious deformity. Pupils equal, round, and reactive to light. Extra ocular muscles intact. Conjunctivae/corneas clear. Neck: Supple, with full range of motion.  No jugular venous distention. Trachea midline. Respiratory:  Normal respiratory effort. Clear to auscultation, bilaterally without Rales/Wheezes/Rhonchi. Cardiovascular:  Regular rate and rhythm with normal S1/S2 without murmurs, rubs or gallops. Abdomen: Soft, non-tender, non-distended with normal bowel sounds. Incision clean, dry, intact. Drains functional  Musculoskeletal:  No clubbing, cyanosis or edema bilaterally. Full range of motion without deformity. Skin: Skin color, texture, turgor normal.  No rashes or lesions. Neurologic:  Neurovascularly intact without any focal sensory/motor deficits. Cranial nerves: II-XII intact, grossly non-focal.  Psychiatric:  Alert and oriented, thought content appropriate, normal insight  Capillary Refill: Brisk,< 3 seconds   Peripheral Pulses: +2 palpable, equal bilaterally       Labs: For convenience and continuity at follow-up the following most recent labs are provided:      CBC:    Lab Results   Component Value Date    WBC 6.6 08/28/2020    HGB 7.4 11/12/2020    HCT 22.4 11/12/2020     08/28/2020       Renal:    Lab Results   Component Value Date     08/28/2020    K 4.2 08/28/2020    K 4.0 08/13/2019     08/28/2020    CO2 28 08/28/2020    BUN 18 08/28/2020    CREATININE 0.62 08/28/2020    CALCIUM 9.20 08/28/2020         Significant Diagnostic Studies    Radiology:   No orders to display          Consults:     IP CONSULT TO HOME CARE NEEDS    Disposition: Home  Condition at Discharge: Stable    Code Status:  Full Code     Patient Instructions:    Discharge lab work: none  Activity: no heavy lifting for 4 weeks  Diet: DIET CARB CONTROL; Follow-up visits:   CM STR [address]  [address] [place] [address] Dr VICTORIA [facility] [address] [phone]             Discharge Medications:      Jocelynn Cheney   Home Medication Instructions GIT:[id number]    Printed on:11/12/20 153   Medication Information                      acyclovir (ZOVIRAX) 800 MG tablet  Take 800 mg by mouth 2 times daily             Biotin 5000 MCG CAPS  Take 1 capsule by mouth daily             HYDROcodone-acetaminophen (NORCO) 5-325 MG per tablet  Take 1 tablet by mouth every 4 hours as needed for Pain for up to 3 days. ibuprofen (ADVIL;MOTRIN) 200 MG tablet  Take 800 mg by mouth every 8 hours as needed              methocarbamol (ROBAXIN) 750 MG tablet  Take 1 tablet by mouth 3 times daily for 10 days             Multiple Vitamins-Minerals (THERAPEUTIC MULTIVITAMIN-MINERALS) tablet  Take 1 tablet by mouth daily                 Time Spent on discharge is more than 20 minutes in the examination, evaluation, counseling and review of medications and discharge plan. Signed: Thank you Sherren Najjar, APRN - CNP for the opportunity to be involved in this patient's care.     Electronically signed by Loren Lafleur MD on 11/12/2020 at 3:31 PM

## 2020-11-12 NOTE — PROGRESS NOTES
Dr. Curtis Mcdermott notified- home health asking specific wound care and how often needs seen. He said no wound care since incisions sealed, and home health assist with drain care every day.

## 2020-11-17 ENCOUNTER — OFFICE VISIT (OUTPATIENT)
Dept: SURGERY | Age: 50
End: 2020-11-17

## 2020-11-17 VITALS — TEMPERATURE: 97.2 F | DIASTOLIC BLOOD PRESSURE: 79 MMHG | SYSTOLIC BLOOD PRESSURE: 126 MMHG | HEART RATE: 87 BPM

## 2020-11-17 PROCEDURE — 99024 POSTOP FOLLOW-UP VISIT: CPT | Performed by: SURGERY

## 2020-11-17 ASSESSMENT — ENCOUNTER SYMPTOMS
COUGH: 0
SINUS PAIN: 0
COLOR CHANGE: 0
SINUS PRESSURE: 0
SHORTNESS OF BREATH: 0
PHOTOPHOBIA: 0
FACIAL SWELLING: 0
TROUBLE SWALLOWING: 0
ABDOMINAL PAIN: 0

## 2020-11-17 NOTE — PROGRESS NOTES
Subjective:      Patient ID: Gustavo Lyon is a 52 y.o. female. Cassandra Severance returns to the office today 1 week follow-up from bilateral reduction mammoplasty and panniculectomy. Overall she states she feels very well, she is happy with the size and shape of her breast and her abdomen. She states her pain is well controlled, and she has no symptoms of fever, chills, nausea, vomiting. Her drain output is noted as she presents with her diary and it is noted that the right drain is significantly less and it is removed. Review of Systems   Constitutional: Negative for activity change, appetite change, chills, fever and unexpected weight change. HENT: Negative for dental problem, facial swelling, nosebleeds, sinus pressure, sinus pain and trouble swallowing. Eyes: Negative for photophobia and visual disturbance. Respiratory: Negative for cough and shortness of breath. Cardiovascular: Negative for chest pain and leg swelling. Gastrointestinal: Negative for abdominal pain. Endocrine: Negative for cold intolerance and heat intolerance. Musculoskeletal: Negative for neck pain and neck stiffness. Skin: Negative for color change, pallor, rash and wound. Neurological: Negative for dizziness, facial asymmetry, light-headedness, numbness and headaches. Hematological: Does not bruise/bleed easily. Objective:   Physical Exam  Vitals signs and nursing note reviewed. Exam conducted with a chaperone present. Constitutional:       General: She is not in acute distress. Appearance: Normal appearance. HENT:      Head: Normocephalic and atraumatic. Right Ear: External ear normal.      Left Ear: External ear normal.      Nose: Nose normal.      Mouth/Throat:      Mouth: Mucous membranes are moist.      Pharynx: Oropharynx is clear. Eyes:      General:         Right eye: No discharge. Left eye: No discharge. Extraocular Movements: Extraocular movements intact. Conjunctiva/sclera: Conjunctivae normal.      Pupils: Pupils are equal, round, and reactive to light. Neck:      Musculoskeletal: Normal range of motion and neck supple. Thyroid: No thyromegaly. Trachea: No tracheal deviation. Cardiovascular:      Rate and Rhythm: Normal rate and regular rhythm. Pulses: Normal pulses. No decreased pulses. Pulmonary:      Effort: Pulmonary effort is normal. No respiratory distress. Chest:       Abdominal:      General: Abdomen is flat. A surgical scar is present. There is no distension. Palpations: Abdomen is soft. Tenderness: There is no abdominal tenderness. Hernia: No hernia is present. Musculoskeletal: Normal range of motion. General: No deformity. Skin:     General: Skin is warm. Capillary Refill: Capillary refill takes 2 to 3 seconds. Findings: No erythema or rash. Neurological:      General: No focal deficit present. Mental Status: She is alert and oriented to person, place, and time. Mental status is at baseline. Sensory: No sensory deficit. Psychiatric:         Mood and Affect: Mood normal.         Behavior: Behavior is cooperative. Thought Content: Thought content normal.         Judgment: Judgment normal.         Assessment:      Postop recheck 1 week from bilateral reduction mammoplasty and panniculectomy, overall she is doing very well      Plan:      Recheck in 1 week, continue with lifting restrictions, continue with abdominal binder.         Beulah Macias MD

## 2020-11-20 ENCOUNTER — OFFICE VISIT (OUTPATIENT)
Dept: SURGERY | Age: 50
End: 2020-11-20

## 2020-11-20 VITALS
SYSTOLIC BLOOD PRESSURE: 111 MMHG | DIASTOLIC BLOOD PRESSURE: 75 MMHG | TEMPERATURE: 97.9 F | HEART RATE: 66 BPM | WEIGHT: 165.2 LBS | BODY MASS INDEX: 32.81 KG/M2

## 2020-11-20 PROCEDURE — 99024 POSTOP FOLLOW-UP VISIT: CPT | Performed by: SURGERY

## 2020-11-20 ASSESSMENT — ENCOUNTER SYMPTOMS
SINUS PRESSURE: 0
TROUBLE SWALLOWING: 0
PHOTOPHOBIA: 0
SHORTNESS OF BREATH: 0
SINUS PAIN: 0
COUGH: 0
FACIAL SWELLING: 0
COLOR CHANGE: 0
ABDOMINAL PAIN: 0

## 2020-11-20 NOTE — PROGRESS NOTES
Trachea: No tracheal deviation. Cardiovascular:      Rate and Rhythm: Normal rate. Pulses: Normal pulses. No decreased pulses. Pulmonary:      Effort: Pulmonary effort is normal. No respiratory distress. Chest:       Abdominal:      General: Abdomen is flat. There is no distension. Palpations: Abdomen is soft. Tenderness: There is no abdominal tenderness. Musculoskeletal: Normal range of motion. General: No deformity. Skin:     General: Skin is warm. Capillary Refill: Capillary refill takes 2 to 3 seconds. Findings: No erythema or rash. Neurological:      General: No focal deficit present. Mental Status: She is alert and oriented to person, place, and time. Mental status is at baseline. Sensory: No sensory deficit. Psychiatric:         Mood and Affect: Mood normal.         Behavior: Behavior normal.         Thought Content: Thought content normal.         Judgment: Judgment normal.         Assessment:      Postoperative check following bilateral reduction mammoplasty and panniculectomy. Overall she is doing very well and very happy with her results. Her drain output still is too high for removal.      Plan:      Continue with abdominal binder, follow-up next week for drain removal, continue activity restrictions.         Radha Jiménez MD

## 2020-11-29 ENCOUNTER — HOSPITAL ENCOUNTER (OUTPATIENT)
Age: 50
Setting detail: OBSERVATION
Discharge: HOME | DRG: 383 | End: 2020-11-30
Attending: EMERGENCY MEDICINE | Admitting: FAMILY MEDICINE
Payer: MEDICAID

## 2020-11-29 ENCOUNTER — APPOINTMENT (OUTPATIENT)
Dept: CT IMAGING | Age: 50
DRG: 383 | End: 2020-11-29
Payer: MEDICAID

## 2020-11-29 PROBLEM — L03.90 CELLULITIS: Status: ACTIVE | Noted: 2020-11-29

## 2020-11-29 LAB
BASOPHILS # BLD: 0.5 %
BASOPHILS ABSOLUTE: 0.1 THOU/MM3 (ref 0–0.1)
BUN, WHOLE BLOOD: 12 MG/DL (ref 8–26)
CHLORIDE, WHOLE BLOOD: 98 MEQ/L (ref 98–109)
CREATININE, WHOLE BLOOD: 0.6 MG/DL (ref 0.5–1.2)
EOSINOPHIL # BLD: 0.7 %
EOSINOPHILS ABSOLUTE: 0.1 THOU/MM3 (ref 0–0.4)
GFR, ESTIMATED: > 90 ML/MIN/1.73M2
GLUCOSE, WHOLE BLOOD: 103 MG/DL (ref 70–108)
HCT VFR BLD CALC: 33.2 % (ref 37–47)
HEMOGLOBIN: 11.2 GM/DL (ref 12–16)
IMMATURE GRANS (ABS): 0.09 THOU/MM3 (ref 0–0.07)
IMMATURE GRANULOCYTES: 0.6 %
LACTIC ACID, SEPSIS: 1.1 MMOL/L (ref 0.5–1.9)
LYMPHOCYTES # BLD: 16.4 %
LYMPHOCYTES ABSOLUTE: 2.3 THOU/MM3 (ref 1–4.8)
MCH RBC QN AUTO: 29.7 PG (ref 27–31)
MCHC RBC AUTO-ENTMCNC: 33.7 GM/DL (ref 33–37)
MCV RBC AUTO: 88 FL (ref 81–99)
MONOCYTES # BLD: 9.8 %
MONOCYTES ABSOLUTE: 1.4 THOU/MM3 (ref 0.4–1.3)
NUCLEATED RED BLOOD CELLS: 0 /100 WBC
PDW BLD-RTO: 14.9 % (ref 11.5–14.5)
PLATELET # BLD: 444 THOU/MM3 (ref 130–400)
PMV BLD AUTO: 8.8 FL (ref 7.4–10.4)
POTASSIUM, WHOLE BLOOD: 3.9 MEQ/L (ref 3.5–4.9)
PROCALCITONIN: 0.69 NG/ML (ref 0.01–0.09)
RBC # BLD: 3.77 MILL/MM3 (ref 4.2–5.4)
SEG NEUTROPHILS: 72 %
SEGMENTED NEUTROPHILS ABSOLUTE COUNT: 10.3 THOU/MM3 (ref 1.8–7.7)
SODIUM, WHOLE BLOOD: 134 MEQ/L (ref 138–146)
TOTAL CO2, VENOUS: 28 MEQ/L (ref 23–33)
WBC # BLD: 14.3 THOU/MM3 (ref 4.8–10.8)

## 2020-11-29 PROCEDURE — 83605 ASSAY OF LACTIC ACID: CPT

## 2020-11-29 PROCEDURE — 87147 CULTURE TYPE IMMUNOLOGIC: CPT

## 2020-11-29 PROCEDURE — 6360000002 HC RX W HCPCS: Performed by: NURSE PRACTITIONER

## 2020-11-29 PROCEDURE — 99215 OFFICE O/P EST HI 40 MIN: CPT

## 2020-11-29 PROCEDURE — 87077 CULTURE AEROBIC IDENTIFY: CPT

## 2020-11-29 PROCEDURE — 96365 THER/PROPH/DIAG IV INF INIT: CPT

## 2020-11-29 PROCEDURE — 85025 COMPLETE CBC W/AUTO DIFF WBC: CPT

## 2020-11-29 PROCEDURE — 96372 THER/PROPH/DIAG INJ SC/IM: CPT

## 2020-11-29 PROCEDURE — 87205 SMEAR GRAM STAIN: CPT

## 2020-11-29 PROCEDURE — 82947 ASSAY GLUCOSE BLOOD QUANT: CPT

## 2020-11-29 PROCEDURE — 87070 CULTURE OTHR SPECIMN AEROBIC: CPT

## 2020-11-29 PROCEDURE — G0378 HOSPITAL OBSERVATION PER HR: HCPCS

## 2020-11-29 PROCEDURE — 2500000003 HC RX 250 WO HCPCS: Performed by: NURSE PRACTITIONER

## 2020-11-29 PROCEDURE — 82565 ASSAY OF CREATININE: CPT

## 2020-11-29 PROCEDURE — 2580000003 HC RX 258: Performed by: STUDENT IN AN ORGANIZED HEALTH CARE EDUCATION/TRAINING PROGRAM

## 2020-11-29 PROCEDURE — 2580000003 HC RX 258: Performed by: FAMILY MEDICINE

## 2020-11-29 PROCEDURE — 99223 1ST HOSP IP/OBS HIGH 75: CPT | Performed by: FAMILY MEDICINE

## 2020-11-29 PROCEDURE — 99283 EMERGENCY DEPT VISIT LOW MDM: CPT

## 2020-11-29 PROCEDURE — 6360000002 HC RX W HCPCS: Performed by: FAMILY MEDICINE

## 2020-11-29 PROCEDURE — 87040 BLOOD CULTURE FOR BACTERIA: CPT

## 2020-11-29 PROCEDURE — 6370000000 HC RX 637 (ALT 250 FOR IP): Performed by: STUDENT IN AN ORGANIZED HEALTH CARE EDUCATION/TRAINING PROGRAM

## 2020-11-29 PROCEDURE — 36415 COLL VENOUS BLD VENIPUNCTURE: CPT

## 2020-11-29 PROCEDURE — 87075 CULTR BACTERIA EXCEPT BLOOD: CPT

## 2020-11-29 PROCEDURE — 80051 ELECTROLYTE PANEL: CPT

## 2020-11-29 PROCEDURE — 71260 CT THORAX DX C+: CPT

## 2020-11-29 PROCEDURE — 84145 PROCALCITONIN (PCT): CPT

## 2020-11-29 PROCEDURE — 87186 SC STD MICRODIL/AGAR DIL: CPT

## 2020-11-29 PROCEDURE — 84520 ASSAY OF UREA NITROGEN: CPT

## 2020-11-29 PROCEDURE — 6360000004 HC RX CONTRAST MEDICATION: Performed by: FAMILY MEDICINE

## 2020-11-29 RX ORDER — ONDANSETRON 2 MG/ML
4 INJECTION INTRAMUSCULAR; INTRAVENOUS EVERY 6 HOURS PRN
Status: DISCONTINUED | OUTPATIENT
Start: 2020-11-29 | End: 2020-11-30 | Stop reason: HOSPADM

## 2020-11-29 RX ORDER — SODIUM CHLORIDE 9 MG/ML
INJECTION, SOLUTION INTRAVENOUS CONTINUOUS
Status: DISCONTINUED | OUTPATIENT
Start: 2020-11-29 | End: 2020-11-30 | Stop reason: HOSPADM

## 2020-11-29 RX ORDER — SODIUM CHLORIDE 0.9 % (FLUSH) 0.9 %
10 SYRINGE (ML) INJECTION PRN
Status: DISCONTINUED | OUTPATIENT
Start: 2020-11-29 | End: 2020-11-30 | Stop reason: HOSPADM

## 2020-11-29 RX ORDER — PROMETHAZINE HYDROCHLORIDE 25 MG/1
12.5 TABLET ORAL EVERY 6 HOURS PRN
Status: DISCONTINUED | OUTPATIENT
Start: 2020-11-29 | End: 2020-11-30 | Stop reason: HOSPADM

## 2020-11-29 RX ORDER — NAPROXEN 250 MG/1
250 TABLET ORAL ONCE
Status: COMPLETED | OUTPATIENT
Start: 2020-11-29 | End: 2020-11-29

## 2020-11-29 RX ORDER — ACETAMINOPHEN 650 MG/1
650 SUPPOSITORY RECTAL EVERY 6 HOURS PRN
Status: DISCONTINUED | OUTPATIENT
Start: 2020-11-29 | End: 2020-11-30 | Stop reason: HOSPADM

## 2020-11-29 RX ORDER — ACETAMINOPHEN 500 MG
1000 TABLET ORAL ONCE
Status: COMPLETED | OUTPATIENT
Start: 2020-11-29 | End: 2020-11-29

## 2020-11-29 RX ORDER — POLYETHYLENE GLYCOL 3350 17 G/17G
17 POWDER, FOR SOLUTION ORAL DAILY PRN
Status: DISCONTINUED | OUTPATIENT
Start: 2020-11-29 | End: 2020-11-30 | Stop reason: HOSPADM

## 2020-11-29 RX ORDER — ACETAMINOPHEN 325 MG/1
650 TABLET ORAL EVERY 6 HOURS PRN
Status: DISCONTINUED | OUTPATIENT
Start: 2020-11-29 | End: 2020-11-30 | Stop reason: HOSPADM

## 2020-11-29 RX ORDER — SODIUM CHLORIDE 0.9 % (FLUSH) 0.9 %
10 SYRINGE (ML) INJECTION EVERY 12 HOURS SCHEDULED
Status: DISCONTINUED | OUTPATIENT
Start: 2020-11-29 | End: 2020-11-30 | Stop reason: HOSPADM

## 2020-11-29 RX ORDER — 0.9 % SODIUM CHLORIDE 0.9 %
30 INTRAVENOUS SOLUTION INTRAVENOUS ONCE
Status: COMPLETED | OUTPATIENT
Start: 2020-11-29 | End: 2020-11-29

## 2020-11-29 RX ADMIN — SODIUM CHLORIDE 2178 ML: 9 INJECTION, SOLUTION INTRAVENOUS at 18:23

## 2020-11-29 RX ADMIN — NAPROXEN 250 MG: 250 TABLET ORAL at 19:47

## 2020-11-29 RX ADMIN — LIDOCAINE HYDROCHLORIDE 1 G: 10 INJECTION, SOLUTION EPIDURAL; INFILTRATION; INTRACAUDAL; PERINEURAL at 16:44

## 2020-11-29 RX ADMIN — SODIUM CHLORIDE: 9 INJECTION, SOLUTION INTRAVENOUS at 21:21

## 2020-11-29 RX ADMIN — IOPAMIDOL 80 ML: 755 INJECTION, SOLUTION INTRAVENOUS at 19:47

## 2020-11-29 RX ADMIN — VANCOMYCIN HYDROCHLORIDE 1000 MG: 1 INJECTION, POWDER, LYOPHILIZED, FOR SOLUTION INTRAVENOUS at 20:15

## 2020-11-29 RX ADMIN — ACETAMINOPHEN 1000 MG: 500 TABLET ORAL at 18:24

## 2020-11-29 ASSESSMENT — ENCOUNTER SYMPTOMS
RHINORRHEA: 0
SHORTNESS OF BREATH: 0
COLOR CHANGE: 1
SINUS PRESSURE: 0
SORE THROAT: 0
ABDOMINAL PAIN: 0
CONSTIPATION: 0
EYES NEGATIVE: 1
COUGH: 0
NAUSEA: 0
GASTROINTESTINAL NEGATIVE: 1
VOMITING: 0
DIARRHEA: 0
WHEEZING: 0
SINUS PAIN: 0
ALLERGIC/IMMUNOLOGIC NEGATIVE: 1
CHEST TIGHTNESS: 0

## 2020-11-29 ASSESSMENT — PAIN DESCRIPTION - ONSET
ONSET: ON-GOING
ONSET: ON-GOING

## 2020-11-29 ASSESSMENT — PAIN DESCRIPTION - PROGRESSION
CLINICAL_PROGRESSION: NOT CHANGED

## 2020-11-29 ASSESSMENT — PAIN SCALES - GENERAL
PAINLEVEL_OUTOF10: 10
PAINLEVEL_OUTOF10: 6
PAINLEVEL_OUTOF10: 5

## 2020-11-29 ASSESSMENT — PAIN - FUNCTIONAL ASSESSMENT: PAIN_FUNCTIONAL_ASSESSMENT: ACTIVITIES ARE NOT PREVENTED

## 2020-11-29 ASSESSMENT — PAIN DESCRIPTION - LOCATION
LOCATION: BREAST

## 2020-11-29 ASSESSMENT — PAIN DESCRIPTION - DESCRIPTORS
DESCRIPTORS: ACHING;CONSTANT;DISCOMFORT;SORE
DESCRIPTORS: ACHING;DISCOMFORT;SORE

## 2020-11-29 ASSESSMENT — PAIN DESCRIPTION - PAIN TYPE
TYPE: ACUTE PAIN
TYPE: ACUTE PAIN

## 2020-11-29 ASSESSMENT — PAIN DESCRIPTION - ORIENTATION
ORIENTATION: RIGHT
ORIENTATION: RIGHT

## 2020-11-29 ASSESSMENT — PAIN DESCRIPTION - FREQUENCY
FREQUENCY: CONTINUOUS
FREQUENCY: CONTINUOUS

## 2020-11-29 NOTE — ED PROVIDER NOTES
diarrhea, or swelling in her lower limbs. She does actually states some numbness and tingling in her right arm at this point in time. She does have a wound pump in place for a wound in her left lower pelvic area. That has been draining okay and she does not state any issues with that at the current moment. REVIEW OF SYSTEMS  Review of Systems   Constitutional: Positive for activity change, chills, fatigue and fever. Negative for appetite change and diaphoresis. HENT: Negative for congestion, dental problem, drooling, ear discharge, ear pain, postnasal drip, rhinorrhea, sinus pressure, sinus pain, sneezing, sore throat and tinnitus. Respiratory: Negative for cough, chest tightness, shortness of breath and wheezing. Cardiovascular: Negative for chest pain, palpitations and leg swelling. Gastrointestinal: Negative for abdominal pain, constipation, diarrhea, nausea and vomiting. Genitourinary: Negative for difficulty urinating, dysuria, frequency, hematuria and pelvic pain. Musculoskeletal: Positive for myalgias. Negative for arthralgias, gait problem, joint swelling, neck pain and neck stiffness. Significant pain in right breast, measuring about 10 out of 10. Red and tender to touch. Skin: Positive for wound (Wound in the right breast, states it is open when it was not prior). Negative for pallor and rash. Neurological: Positive for numbness (Mild numbness down right arm). Negative for dizziness, tremors, syncope, weakness, light-headedness and headaches. PAST MEDICAL HISTORY   has a past medical history of Arthritis and Hyperlipidemia. SURGICAL HISTORY   has a past surgical history that includes fracture surgery; Hysteroplasty; Wrist surgery (Right, 2014); Hysterectomy, total abdominal; Ankle surgery (Left, 2006); Upper gastrointestinal endoscopy (N/A, 2/18/2019); Colonoscopy; Ankle surgery (Right, 03/2019); Sleeve Gastrectomy (N/A, 8/12/2019);  Breast reduction surgery (Bilateral, 11/9/2020); lipectomy (N/A, 11/9/2020); and Breast reduction surgery. CURRENT MEDICATIONS  Previous Medications    BIOTIN 5000 MCG CAPS    Take 1 capsule by mouth daily    IBUPROFEN (ADVIL;MOTRIN) 200 MG TABLET    Take 800 mg by mouth every 8 hours as needed     MULTIPLE VITAMINS-MINERALS (THERAPEUTIC MULTIVITAMIN-MINERALS) TABLET    Take 1 tablet by mouth daily       ALLERGIES  is allergic to celebrex [celecoxib]; toradol [ketorolac tromethamine]; and morphine. FAMILY HISTORY  She indicated that her mother is alive. She indicated that her father is alive. She indicated that the status of her paternal uncle is unknown.   family history includes Arthritis in her father and mother; Cancer in her paternal uncle; Dementia in her father; Diabetes in her father; High Blood Pressure in her father and mother; Parkinsonism in her father. SOCIAL HISTORY   reports that she quit smoking about 16 years ago. Her smoking use included cigarettes. She smoked 1.00 pack per day. She has never used smokeless tobacco. She reports current alcohol use. She reports that she does not use drugs. PHYSICAL EXAM     INITIAL VITALS: /65   Pulse 86   Temp 100.7 °F (38.2 °C) (Oral)   Resp 18   Ht 4' 11.5\" (1.511 m)   Wt 160 lb (72.6 kg)   SpO2 99%   BMI 31.78 kg/m² Estimated body mass index is 31.78 kg/m² as calculated from the following:    Height as of this encounter: 4' 11.5\" (1.511 m). Weight as of this encounter: 160 lb (72.6 kg). Physical Exam  Vitals signs and nursing note reviewed. Exam conducted with a chaperone present (Open wound marked with yellow and black eschar in the chart below surgical wound marked and pink. ). Constitutional:       General: She is not in acute distress. Appearance: Normal appearance. She is obese. She is not ill-appearing, toxic-appearing or diaphoretic. HENT:      Head: Normocephalic and atraumatic.       Right Ear: External ear normal.      Left Ear: External ear normal.      Nose: Nose normal. No congestion or rhinorrhea. Mouth/Throat:      Mouth: Mucous membranes are moist.      Pharynx: Oropharynx is clear. No oropharyngeal exudate or posterior oropharyngeal erythema. Eyes:      General: No scleral icterus. Extraocular Movements: Extraocular movements intact. Conjunctiva/sclera: Conjunctivae normal.      Pupils: Pupils are equal, round, and reactive to light. Neck:      Musculoskeletal: Normal range of motion and neck supple. No muscular tenderness. Cardiovascular:      Rate and Rhythm: Regular rhythm. Tachycardia present. Pulses: Normal pulses. Heart sounds: Normal heart sounds. No murmur. No friction rub. No gallop. Pulmonary:      Effort: Pulmonary effort is normal.      Breath sounds: Normal breath sounds. No wheezing, rhonchi or rales. Chest:      Chest wall: No mass, tenderness (Diffusely over the right breast), crepitus or edema (Edema right breast). Breasts:         Right: Skin change (Redness as marked in the chart below with red marker) and tenderness (Tenderness all over her right breast) present. No bleeding or nipple discharge. Left: Normal. No nipple discharge, skin change or tenderness. Abdominal:      General: Abdomen is flat. Bowel sounds are normal.      Palpations: Abdomen is soft. There is no mass. Tenderness: There is no abdominal tenderness. Musculoskeletal: Normal range of motion. General: No swelling or tenderness. Right lower leg: No edema. Left lower leg: No edema. Lymphadenopathy:      Cervical: No cervical adenopathy. Upper Body:      Right upper body: No supraclavicular or axillary adenopathy. Left upper body: No supraclavicular or axillary adenopathy. Skin:     General: Skin is warm and dry. Capillary Refill: Capillary refill takes less than 2 seconds. Findings: Erythema (see chest) present. No lesion or rash.    Neurological: General: No focal deficit present. Mental Status: She is alert and oriented to person, place, and time. Mental status is at baseline. Cranial Nerves: No cranial nerve deficit. Motor: No weakness. MEDICAL DECISION MAKING  Review of past medical records show recent history of breast reduction surgery on 11/9/2020. She is scheduled for follow-up with the plastic surgeon and her family doctor on 11/30/2020. She did not have any issues or pain up until this point in time. Initial assessment: Stable albeit ill-appearing middle-aged female who does have significant pain especially on gentle palpation of the right breast.  The right breast itself does appear reddened, and appears to have cellulitis. The wound itself does have eschar in the center with some purulent discharge around the edges. Plan: Continue basic sepsis protocol, considering the high fevers and the tachycardia. Cultures were drawn after they were given a dose of Rocephin in the ambulatory care setting. BMP (Na 134)   CBC (WBC 14.3, HgB 11.2, )   Wound Culture   Blood Cultures   ProCalcitonin (0.69)   Lactate (1.1)   CT Chest w/ Contrast (not preformed at time of admission)     Given a dose of Rocephin in the ambulatory care setting. She was started 30 mg/kg of IV fluid here, as well as a dose of 1000 mg of Tylenol for pain as well as for her increased temperature. Naproxen was also added for pain. DIFFERENTIAL DIAGNOSIS:  Right breast cellulitis  Right breast abscess  Sepsis due to abscess      DIAGNOSTIC RESULTS    EKG    None Taken. RADIOLOGY:  I have reviewed radiologic plain film image(s).  The plain films will be read or over read by the radiologist.All other non-plain film images(s) such as CT, Ultrasound and MRI have been read by the radiologist.  [address]    (Results Pending)       LABS:  Labs Reviewed   CBC WITH AUTO DIFFERENTIAL - Abnormal; Notable for the following components: Result Value    WBC 14.3 (*)     RBC 3.77 (*)     Hemoglobin 11.2 (*)     Hematocrit 33.2 (*)     RDW 14.9 (*)     Platelets 679 (*)     All other components within normal limits   DIFFERENTIAL - Abnormal; Notable for the following components:    Segs Absolute 10.3 (*)     Monocytes Absolute 1.4 (*)     Immature Grans (Abs) 0.09 (*)     All other components within normal limits   POC BASIC METABOL PANEL W/O CALCIUM - Abnormal; Notable for the following components:    Sodium, Whole Blood 134 (*)     All other components within normal limits   PROCALCITONIN - Abnormal; Notable for the following components:    Procalcitonin 0.69 (*)     All other components within normal limits   CULTURE, BLOOD 1   CULTURE, BLOOD 2   CULTURE, ANAEROBIC AND AEROBIC   GLOMERULAR FILTRATION RATE, ESTIMATED   LACTATE, SEPSIS   LACTATE, SEPSIS   CBC WITH AUTO DIFFERENTIAL   COMPREHENSIVE METABOLIC PANEL W/ REFLEX TO MG FOR LOW K     All other unresulted laboratory test above are normal:    Vitals:    Vitals:    11/29/20 1627 11/29/20 1900   BP: 126/85 110/65   Pulse: 94 86   Resp: 16 18   Temp: 100.7 °F (38.2 °C)    TempSrc: Oral    SpO2: 96% 99%   Weight: 160 lb (72.6 kg)    Height: 4' 11.5\" (1.511 m)        EMERGENCY DEPARTMENT COURSE:    Medications   vancomycin 1000 mg IVPB in 250 mL D5W addavial (has no administration in time range)   sodium chloride flush 0.9 % injection 10 mL (has no administration in time range)   sodium chloride flush 0.9 % injection 10 mL (has no administration in time range)   acetaminophen (TYLENOL) tablet 650 mg (has no administration in time range)     Or   acetaminophen (TYLENOL) suppository 650 mg (has no administration in time range)   polyethylene glycol (GLYCOLAX) packet 17 g (has no administration in time range)   promethazine (PHENERGAN) tablet 12.5 mg (has no administration in time range)     Or   ondansetron (ZOFRAN) injection 4 mg (has no administration in time range)   enoxaparin (LOVENOX) injection 40 mg (has no administration in time range)   0.9 % sodium chloride infusion (has no administration in time range)   naproxen (NAPROSYN) tablet 250 mg (has no administration in time range)   cefTRIAXone (ROCEPHIN) 1 g in lidocaine 1 % 2.86 mL IM Injection (1 g Intramuscular Given 11/29/20 1644)   0.9 % sodium chloride IV bolus 2,178 mL (2,178 mLs Intravenous New Bag 11/29/20 1823)   acetaminophen (TYLENOL) tablet 1,000 mg (1,000 mg Oral Given 11/29/20 1824)       ED Course as of Nov 29 1929   Sun Nov 29, 2020 1656 POC Basic Metabol Panel without Calcium [MK]      ED Course User Index  [MK] Andria Winston, APRN - CNP       The patient was seen and evaluated by me. The patient was transferred over from ambulatory care. On arrival her temperature was 100.7 °F, she was tachycardic but not tachypneic. Her O2 sats were okay. Her white blood cell count was 14.3. She needs 3 of the 4 SIRS criteria. Laboratory and radiological studies were performed, results were reviewed with the patient. Within the department, the patient was treated with 30 mg/kg IV fluids and Tylenol she was given a dose of Rocephin at the ambulatory service. I observed the patient's condition to remain stable during the duration of their stay. I explained my proposed course of treatment to the patient, and they were amenable to my decision. They were discharged home, and they will return to the ED if their symptoms become more severe in nature, or otherwise change. The patient was discussed with the hospitalist team, and they agreed for admission. She did meet 3 of 4 SIRS criteria on presentation to the ER. Her procalcitonin is elevated at 0.69, however this is not quite as high as we would expect to see in a bacterial section. However the patient does have significant pain and tenderness in the area as well as what appears to be cellulitis over the right breast.  There is an open wound present, and cultures were obtained.   The question if there might be an abscess or not, and a CT of the chest was ordered. CT had not been performed by the time the patient was admitted to the hospital.    Controlled Substances Monitoring:       CRITICAL CARE:  None. CONSULTS:  None. PROCEDURES:  None. FINAL IMPRESSION:  1. Sepsis without acute organ dysfunction, due to unspecified organism [facility])        DISPOSITION/PLAN:  PATIENT REFERRED TO:  No follow-up provider specified. DISCHARGE MEDICATIONS:  New Prescriptions    No medications on file         (Please note that portions of this note were completed with a voice recognition program and electronically transcribed. Efforts were made to edit the dictations but occasionally words are mis-transcribed. This transcription may contain errors not detected in proofreading.  This transcription was electronically signed.)    Electronically signed by Shannon Prabhakar DO on 11/29/2020 at 7:29 PM     Shannon Prabhakar DO  Resident  11/29/20 1929

## 2020-11-29 NOTE — ED PROVIDER NOTES
department, the pt was treated with IV fluid hydration Tylenol and Rocephin after blood cultures were taken. I observed the pt's condition to be hemodynamically stable during the duration of their stay. I explained my proposed course of treatment to the pt, and they were amenable to my decision. The patient is admitted to the hospitalist services      Medical Decision-Making:       FINAL IMPRESSION       1. Septicemia ([address]    2. Cellulitis with possible abscess right breast  3. Status post breast reduction right        DISPOSITION/PLAN  PATIENT REFERRED TO: Patient is admitted to the hospitalist services    (Please note that portions of this note were completed with a voice recognition program and electronically transcribed. Efforts were [facility] edit the dictations but occasionally words are mis-transcribed . The transcription may contain errors not detected in proofreading.   This transcription was electronically signed.)        David Roach MD  Attending Emergency Physician          David Roach MD  11/30/20 2007

## 2020-11-29 NOTE — ED TRIAGE NOTES
Pt comes to the ED with fever. Pt states she had breast reduction surgery earlier this month. Pt states she has an open wound underneath her right breast and is having yellow drainage from it.

## 2020-11-29 NOTE — ED PROVIDER NOTES
[address] [facility] [address] [address] EMERGENCY DEPT  [facility]       Chief Complaint   Patient presents with    Fever     103.2 last night    Chills    Generalized Body Aches    Fatigue       Nurses Notes reviewed and I agree except as noted in the HPI. HISTORY OF PRESENT ILLNESS   Marimar Cooper is a 52 y.o. female who presents with breast infection. Pt had bilateral reduction/revision and abdominoplasty 20 days ago. Was doing well until yesterday. Developed redness, swelling and drainage of the right breast. This does have an odor present. Abdominal drain has consistently drained serosanguinous fluid. Some odor present. Pt had significant fever and chills yesterday. She has been drinking fluids well. Pain was well controlled until yesterday when right breast began to swell and become hot. REVIEW OF SYSTEMS     Review of Systems   Constitutional: Positive for activity change, appetite change, chills, fatigue and fever. HENT: Negative. Eyes: Negative. Respiratory: Negative for cough, chest tightness, shortness of breath and wheezing. Cardiovascular: Negative for chest pain and leg swelling. Gastrointestinal: Negative. Endocrine: Negative for cold intolerance and heat intolerance. Genitourinary: Negative. Musculoskeletal: Positive for myalgias. Skin: Positive for color change (right breast) and wound. Allergic/Immunologic: Negative. Neurological: Negative. Hematological: Negative for adenopathy. Does not bruise/bleed easily. Psychiatric/Behavioral: Negative. PAST MEDICAL HISTORY         Diagnosis Date    Arthritis     Hyperlipidemia        SURGICAL HISTORY     Patient  has a past surgical history that includes fracture surgery; Hysteroplasty; Wrist surgery (Right, 2014); Hysterectomy, total abdominal; Ankle surgery (Left, 2006); Upper gastrointestinal endoscopy (N/A, 2/18/2019); Colonoscopy; Ankle surgery (Right, 03/2019);  Sleeve Gastrectomy (N/A, 8/12/2019); Breast reduction surgery (Bilateral, 11/9/2020); lipectomy (N/A, 11/9/2020); and Breast reduction surgery. CURRENT MEDICATIONS       Previous Medications    BIOTIN 5000 MCG CAPS    Take 1 capsule by mouth daily    IBUPROFEN (ADVIL;MOTRIN) 200 MG TABLET    Take 800 mg by mouth every 8 hours as needed     MULTIPLE VITAMINS-MINERALS (THERAPEUTIC MULTIVITAMIN-MINERALS) TABLET    Take 1 tablet by mouth daily       ALLERGIES     Patient is is allergic to celebrex [celecoxib]; toradol [ketorolac tromethamine]; and morphine. FAMILY HISTORY     Patient'sfamily history includes Arthritis in her father and mother; Cancer in her paternal uncle; Dementia in her father; Diabetes in her father; High Blood Pressure in her father and mother; Parkinsonism in her father. SOCIAL HISTORY     Patient  reports that she quit smoking about 16 years ago. Her smoking use included cigarettes. She smoked 1.00 pack per day. She has never used smokeless tobacco. She reports current alcohol use. She reports that she does not use drugs. PHYSICAL EXAM     ED TRIAGE VITALS  BP: 126/85, Temp: 100.7 °F (38.2 °C), Pulse: 94, Resp: 16, SpO2: 96 %  Physical Exam  Vitals signs and nursing note reviewed. Constitutional:       Appearance: Normal appearance. She is normal weight. She is ill-appearing. HENT:      Head: Normocephalic. Nose: Nose normal.      Mouth/Throat:      Mouth: Mucous membranes are moist.   Eyes:      Conjunctiva/sclera: Conjunctivae normal.   Neck:      Musculoskeletal: Normal range of motion and neck supple. No muscular tenderness. Vascular: No carotid bruit. Cardiovascular:      Rate and Rhythm: Regular rhythm. Tachycardia present. Pulses: Normal pulses. Heart sounds: Normal heart sounds. Pulmonary:      Effort: Pulmonary effort is normal.      Breath sounds: Normal breath sounds. No wheezing or rhonchi. Abdominal:      General: Abdomen is flat. Bowel sounds are normal.      Tenderness:  There is abdominal tenderness. Musculoskeletal: Normal range of motion. General: No swelling or tenderness. Lymphadenopathy:      Cervical: Cervical adenopathy present. Skin:     General: Skin is warm and dry. Capillary Refill: Capillary refill takes less than 2 seconds. Coloration: Skin is not cyanotic, mottled or pale. Findings: Erythema and lesion present. Neurological:      General: No focal deficit present. Mental Status: She is alert. Mental status is at baseline.          DIAGNOSTIC RESULTS   Labs:   Results for orders placed or performed during the hospital encounter of 11/29/20   CBC auto differential   Result Value Ref Range    WBC 14.3 (H) 4.8 - 10.8 thou/mm3    RBC 3.77 (L) 4.20 - 5.40 mill/mm3    Hemoglobin 11.2 (L) 12.0 - 16.0 gm/dl    Hematocrit 33.2 (L) 37.0 - 47.0 %    MCV 88 81 - 99 fL    MCH 29.7 27.0 - 31.0 pg    MCHC 33.7 33.0 - 37.0 gm/dl    RDW 14.9 (H) 11.5 - 14.5 %    Platelets 468 (H) 223 - 400 thou/mm3    MPV 8.8 7.4 - 10.4 fL   POC Basic Metabol Panel without Calcium   Result Value Ref Range    Sodium, Whole Blood 134 (L) 138 - 146 meq/l    Potassium, Whole Blood 3.9 3.5 - 4.9 meq/l    Chloride, Whole Blood 98 98 - 109 meq/l    Total CO2, Venous 28 23 - 33 meq/l    Glucose, Whole Blood 103 70 - 108 mg/dl    BUN, WHOLE BLOOD 12 8 - 26 mg/dl    CREATININE, WHOLE BLOOD 0.6 0.5 - 1.2 mg/dl   Glomerular Filtration Rate, Estimated   Result Value Ref Range    GFR, Estimated > 90 ml/min/1.73m2       IMAGING:  No orders to display     URGENT CARE COURSE:     Vitals:    11/29/20 1627   BP: 126/85   Pulse: 94   Resp: 16   Temp: 100.7 °F (38.2 °C)   TempSrc: Oral   SpO2: 96%   Weight: 160 lb (72.6 kg)   Height: 4' 11.5\" (1.511 m)       Medications   cefTRIAXone (ROCEPHIN) 1 g in lidocaine 1 % 2.86 mL IM Injection (1 g Intramuscular Given 11/29/20 1644)     PROCEDURES:  None  FINALIMPRESSION    I have reviewed the patient's medical history in detail and updated the computerized patient record. HPI/ROS per the patient. Labs reviewed with the patient. Decision to transfer to ER for further evaluation. 1. Septicemia ([facility] [address]        DISPOSITION/PLAN   DISPOSITION      PATIENT REFERRED TO:  No follow-up provider specified.   DISCHARGE MEDICATIONS:  New Prescriptions    No medications on file     Current Discharge Medication List          ANJEL Mason - ANJEL Oakes CNP  11/29/20 4922

## 2020-11-29 NOTE — ED NOTES
To [facility] with complaints of fever, fatigue body aches, chills. Started out of nowhere yesterday. Pt had breast reduction and stomach skin removal on 11/9. Under right breast open area draining \"pus\". Right breast much larger than left, red and painful.       Halie Lopez, RN  11/29/20 6500

## 2020-11-30 VITALS
OXYGEN SATURATION: 99 % | HEIGHT: 60 IN | BODY MASS INDEX: 33.08 KG/M2 | RESPIRATION RATE: 16 BRPM | HEART RATE: 84 BPM | SYSTOLIC BLOOD PRESSURE: 117 MMHG | DIASTOLIC BLOOD PRESSURE: 65 MMHG | TEMPERATURE: 98.6 F | WEIGHT: 168.5 LBS

## 2020-11-30 LAB
ALBUMIN SERPL-MCNC: 2.8 G/DL (ref 3.5–5.1)
ALP BLD-CCNC: 78 U/L (ref 38–126)
ALT SERPL-CCNC: 6 U/L (ref 11–66)
ANION GAP SERPL CALCULATED.3IONS-SCNC: 12 MEQ/L (ref 8–16)
AST SERPL-CCNC: 9 U/L (ref 5–40)
BASOPHILS # BLD: 0.5 %
BASOPHILS ABSOLUTE: 0.1 THOU/MM3 (ref 0–0.1)
BILIRUB SERPL-MCNC: 0.5 MG/DL (ref 0.3–1.2)
BUN BLDV-MCNC: 9 MG/DL (ref 7–22)
CALCIUM SERPL-MCNC: 8.2 MG/DL (ref 8.5–10.5)
CHLORIDE BLD-SCNC: 104 MEQ/L (ref 98–111)
CO2: 22 MEQ/L (ref 23–33)
CREAT SERPL-MCNC: 0.4 MG/DL (ref 0.4–1.2)
EOSINOPHIL # BLD: 3.1 %
EOSINOPHILS ABSOLUTE: 0.4 THOU/MM3 (ref 0–0.4)
ERYTHROCYTE [DISTWIDTH] IN BLOOD BY AUTOMATED COUNT: 14.7 % (ref 11.5–14.5)
ERYTHROCYTE [DISTWIDTH] IN BLOOD BY AUTOMATED COUNT: 51 FL (ref 35–45)
GFR SERPL CREATININE-BSD FRML MDRD: > 90 ML/MIN/1.73M2
GLUCOSE BLD-MCNC: 104 MG/DL (ref 70–108)
HCT VFR BLD CALC: 29.2 % (ref 37–47)
HEMOGLOBIN: 9.2 GM/DL (ref 12–16)
IMMATURE GRANS (ABS): 0.06 THOU/MM3 (ref 0–0.07)
IMMATURE GRANULOCYTES: 0.5 %
LYMPHOCYTES # BLD: 15.7 %
LYMPHOCYTES ABSOLUTE: 1.9 THOU/MM3 (ref 1–4.8)
MCH RBC QN AUTO: 29.5 PG (ref 26–33)
MCHC RBC AUTO-ENTMCNC: 31.5 GM/DL (ref 32.2–35.5)
MCV RBC AUTO: 93.6 FL (ref 81–99)
MONOCYTES # BLD: 9.4 %
MONOCYTES ABSOLUTE: 1.2 THOU/MM3 (ref 0.4–1.3)
NUCLEATED RED BLOOD CELLS: 0 /100 WBC
OSMOLALITY CALCULATION: 274.7 MOSMOL/KG (ref 275–300)
PLATELET # BLD: 342 THOU/MM3 (ref 130–400)
PMV BLD AUTO: 9 FL (ref 9.4–12.4)
POTASSIUM REFLEX MAGNESIUM: 3.7 MEQ/L (ref 3.5–5.2)
PROCALCITONIN: 0.46 NG/ML (ref 0.01–0.09)
RBC # BLD: 3.12 MILL/MM3 (ref 4.2–5.4)
SEG NEUTROPHILS: 70.8 %
SEGMENTED NEUTROPHILS ABSOLUTE COUNT: 8.7 THOU/MM3 (ref 1.8–7.7)
SODIUM BLD-SCNC: 138 MEQ/L (ref 135–145)
TOTAL PROTEIN: 5.4 G/DL (ref 6.1–8)
WBC # BLD: 12.3 THOU/MM3 (ref 4.8–10.8)

## 2020-11-30 PROCEDURE — 85025 COMPLETE CBC W/AUTO DIFF WBC: CPT

## 2020-11-30 PROCEDURE — G0378 HOSPITAL OBSERVATION PER HR: HCPCS

## 2020-11-30 PROCEDURE — 6370000000 HC RX 637 (ALT 250 FOR IP): Performed by: SURGERY

## 2020-11-30 PROCEDURE — 1200000000 HC SEMI PRIVATE

## 2020-11-30 PROCEDURE — 80053 COMPREHEN METABOLIC PANEL: CPT

## 2020-11-30 PROCEDURE — 96367 TX/PROPH/DG ADDL SEQ IV INF: CPT

## 2020-11-30 PROCEDURE — 94760 N-INVAS EAR/PLS OXIMETRY 1: CPT

## 2020-11-30 PROCEDURE — 99239 HOSP IP/OBS DSCHRG MGMT >30: CPT | Performed by: INTERNAL MEDICINE

## 2020-11-30 PROCEDURE — 2580000003 HC RX 258: Performed by: FAMILY MEDICINE

## 2020-11-30 PROCEDURE — 6370000000 HC RX 637 (ALT 250 FOR IP): Performed by: FAMILY MEDICINE

## 2020-11-30 PROCEDURE — 6360000002 HC RX W HCPCS: Performed by: FAMILY MEDICINE

## 2020-11-30 PROCEDURE — 84145 PROCALCITONIN (PCT): CPT

## 2020-11-30 PROCEDURE — 36415 COLL VENOUS BLD VENIPUNCTURE: CPT

## 2020-11-30 RX ORDER — IBUPROFEN 200 MG
TABLET ORAL
Qty: 1 TUBE | Refills: 1 | Status: SHIPPED | OUTPATIENT
Start: 2020-11-30 | End: 2021-04-21

## 2020-11-30 RX ORDER — SULFAMETHOXAZOLE AND TRIMETHOPRIM 800; 160 MG/1; MG/1
1 TABLET ORAL 2 TIMES DAILY
Qty: 26 TABLET | Refills: 0 | Status: SHIPPED | OUTPATIENT
Start: 2020-11-30 | End: 2020-12-13

## 2020-11-30 RX ORDER — IBUPROFEN 200 MG
TABLET ORAL 2 TIMES DAILY
Status: DISCONTINUED | OUTPATIENT
Start: 2020-11-30 | End: 2020-11-30 | Stop reason: HOSPADM

## 2020-11-30 RX ORDER — GREEN TEA/HOODIA GORDONII 315-12.5MG
1 CAPSULE ORAL DAILY
Qty: 30 TABLET | Refills: 0 | Status: SHIPPED | OUTPATIENT
Start: 2020-11-30 | End: 2020-12-30

## 2020-11-30 RX ADMIN — ACETAMINOPHEN 650 MG: 325 TABLET ORAL at 08:01

## 2020-11-30 RX ADMIN — VANCOMYCIN HYDROCHLORIDE 1250 MG: 5 INJECTION, POWDER, LYOPHILIZED, FOR SOLUTION INTRAVENOUS at 08:01

## 2020-11-30 RX ADMIN — ACETAMINOPHEN 650 MG: 325 TABLET ORAL at 14:30

## 2020-11-30 RX ADMIN — SODIUM CHLORIDE: 9 INJECTION, SOLUTION INTRAVENOUS at 08:01

## 2020-11-30 RX ADMIN — BACITRACIN ZINC NEOMYCIN SULFATE POLYMYXIN B SULFATE: 400; 3.5; 5 OINTMENT TOPICAL at 09:48

## 2020-11-30 ASSESSMENT — PAIN SCALES - GENERAL
PAINLEVEL_OUTOF10: 5

## 2020-11-30 ASSESSMENT — PAIN DESCRIPTION - DESCRIPTORS: DESCRIPTORS: DULL;ACHING

## 2020-11-30 ASSESSMENT — PAIN DESCRIPTION - ORIENTATION: ORIENTATION: RIGHT

## 2020-11-30 ASSESSMENT — PAIN DESCRIPTION - ONSET: ONSET: ON-GOING

## 2020-11-30 ASSESSMENT — PAIN - FUNCTIONAL ASSESSMENT: PAIN_FUNCTIONAL_ASSESSMENT: ACTIVITIES ARE NOT PREVENTED

## 2020-11-30 ASSESSMENT — PAIN DESCRIPTION - PROGRESSION
CLINICAL_PROGRESSION: NOT CHANGED
CLINICAL_PROGRESSION: NOT CHANGED

## 2020-11-30 ASSESSMENT — PAIN DESCRIPTION - PAIN TYPE: TYPE: CHRONIC PAIN;ACUTE PAIN

## 2020-11-30 ASSESSMENT — PAIN DESCRIPTION - FREQUENCY: FREQUENCY: CONTINUOUS

## 2020-11-30 ASSESSMENT — PAIN DESCRIPTION - LOCATION: LOCATION: BREAST

## 2020-11-30 NOTE — ED NOTES
Upon first contact with patient this RN receives bedside shift report from [facility]. Pt resting on cot with unlabored respirations.         Essie PECK RN  11/29/20 1910

## 2020-11-30 NOTE — CONSULTS
Consult History & Physical      Patient:  Lex Freire  YOB: 1970    MRN: [id number]     Acct: [de-identified]      Chief Complaint:  Right breast infection    Date of Service: Pt seen/examined in consultation on 11/30/20    History Of Present Illness:      52 y.o. female who we are asked to see/evaluate by Carmela Horton MD for medical management of right breast.  She had undergone a bilateral reduction mammoplasty and panniculectomy on 11/12/2020 and postoperatively her course was uneventful until yesterday she noted fever, chills, and increased pain and redness of the right breast.  She was seen in the ER and admitted to observation by the hospitalist for further care and treatment. Past Medical History:        Diagnosis Date    Arthritis     Hyperlipidemia        Past Surgical History:        Procedure Laterality Date    ANKLE SURGERY Left 2006    ANKLE SURGERY Right 03/2019    plate and screws    BREAST REDUCTION SURGERY Bilateral 11/9/2020    BILATERAL BREAST MAMMOPLASTY REDUCTION performed by Ginger Matias MD at [place] [address], TOTAL ABDOMINAL      HYSTEROPLASTY      LIPECTOMY N/A 11/9/2020    PANNICULECTOMY performed by Ginger Matias MD at [facility] N/A 8/12/2019    ROBOTIC SLEEVE GASTRECTOMY performed by Law Alvarez MD at [address] [place] [address] 2/18/2019    EGD WITH BX performed by Krysten Palomo MD at [facility]    WRIST SURGERY Right 2014    plate and screws       Medications Prior to Admission:    Prior to Admission medications    Medication Sig Start Date End Date Taking?  Authorizing Provider   Multiple Vitamins-Minerals (THERAPEUTIC MULTIVITAMIN-MINERALS) tablet Take 1 tablet by mouth daily   Yes Historical Provider, MD   ibuprofen (ADVIL;MOTRIN) 200 MG tablet Take 800 mg by mouth every 8 hours as needed    Yes Historical

## 2020-11-30 NOTE — PROGRESS NOTES
Discharge teaching and instructions for diagnosis/procedure of cellulitis completed with patient using teachback method. AVS reviewed. Patient voiced understanding regarding prescriptions, follow up appointments, and care of self at home. Patient awaiting a ride home at this time.

## 2020-11-30 NOTE — H&P
Hospitalist - History & Physical      Patient: Katerin Dillon    Unit/Bed:23/023A  YOB: 1970  MRN: [id number]   Acct: [de-identified]   PCP: ANJEL Teixeira CNP    Date of Service: Pt seen/examined on 11/29/20  and Admitted to Observation with expected LOS less than two midnights due to medical therapy. Chief Complaint:  Right breast pain    Assessment and Plan:-  1. Right breast mastitis/cellulitis: As mentioned in H&P.  CT scan of the breast and process to rule out abscess formation. Mucopurulent discharge culture obtained before vancomycin injection. I will start vancomycin 1 g every 12 hours to cover MRSA, MSSA, Streptococcus. Tylenol 650 as needed every 6 hours. Repeat procalcitonin tomorrow. Patient currently feverish, monitor closely. Blood cultures been obtained 2 different sites 5 minutes apart, we will monitor on progress, consider narrow antibiotic choices according to the results, if negative and patient afebrile in the next 48 hours consider switch to oral antibiotic cover MRSA and Streptococcus in addition to MSSA then discharge with total 14 days antibiotic. 2. Leukocytosis likely secondary to #1: Repeat CBC tomorrow and monitor closely. Started vancomycin. 3. Status post bilateral reduction mammoplasty and panniculectomy done by Dr. Anel Ochoa as of 11/9/2020. Last time patient saw her doctor as of 11/20/2020 everything was fine. At that time patient still has the drain and scheduled as of tomorrow to remove the drain. At this point patient has infected site likely secondary to the drain. We will consult Dr. Anel Ochoa for further recommendations while inpatient given the fact that she is scheduled to see him tomorrow. History Of Present Illness: This is a 53-year-old female presented with right breast pain with redness. She had reduction surgery as of 11/9/2020.   She mentioned after the surgery she lost 2 units of blood been replaced and stayed almost 4 days here at our hospital and then she got discharged. Since then she has been feeling better. Although yesterday she started spiking fever with temp max 103 around 4 PM.  Since then she lost her appetite and felt fatigued with myalgia. Today she went to urgent care and received 1 shot of Rocephin before she came to our ER which was around 4:30 PM.  She is currently healthy not taking chronically any medications. Patient denies any nausea, vomiting, diarrhea, abdominal pain, shortness of breath, chest pain, loss of taste, loss of smell. In the ED her temperature was 100.7, leukocytosis 14.3 white blood cell count with left shift, procalcitonin elevated 0.69.  2 sets of blood cultures been drawn. Culture to the mucopurulent discharge of the right breast obtained. I ordered 1 g of vancomycin in ED. We will admit for possible bacteremia, cellulitis/mastitis of the right breast.      Past Medical History:        Diagnosis Date    Arthritis     Hyperlipidemia        Past Surgical History:        Procedure Laterality Date    ANKLE SURGERY Left 2006    ANKLE SURGERY Right 03/2019    plate and screws    BREAST REDUCTION SURGERY Bilateral 11/9/2020    BILATERAL BREAST MAMMOPLASTY REDUCTION performed by Chas Kingsley MD at [place] [address], TOTAL ABDOMINAL      HYSTEROPLASTY      LIPECTOMY N/A 11/9/2020    PANNICULECTOMY [facility]as [facility]ley MD at [facility] N/A 8/12/2019    ROBOTIC SLEEVE GASTRECTOMY performed by Joshua Gracia MD at [place] [address] 2/18/2019    EGD WITH BX performed by Champ Escobar MD at [facility]    WRIST SURGERY Right 2014    plate and screws       Home Medications:   No current facility-administered medications on file prior to encounter.       Current Outpatient Medications on File Prior to Encounter   Medication Sig Dispense Refill    Multiple Vitamins-Minerals (THERAPEUTIC MULTIVITAMIN-MINERALS) tablet Take 1 tablet by mouth daily      ibuprofen (ADVIL;MOTRIN) 200 MG tablet Take 800 mg by mouth every 8 hours as needed       Biotin 5000 MCG CAPS Take 1 capsule by mouth daily         Allergies:    Celebrex [celecoxib]; Toradol [ketorolac tromethamine]; and Morphine    Social History:    reports that she quit smoking about 16 years ago. Her smoking use included cigarettes. She smoked 1.00 pack per day. She has never used smokeless tobacco. She reports current alcohol use. She reports that she does not use drugs. Family History:       Problem Relation Age of Onset    Arthritis Mother     High Blood Pressure Mother     Arthritis Father     Diabetes Father     Dementia Father     Parkinsonism Father     High Blood Pressure Father     Cancer Paternal Uncle         colon       Diet:  DIET GENERAL; No Added Salt (3-4 GM)    Review of systems:   Pertinent positives as noted in the HPI. All other systems reviewed and negative. PHYSICAL EXAM:  /65   Pulse 86   Temp 100.7 °F (38.2 °C) (Oral)   Resp 18   Ht 4' 11.5\" (1.511 m)   Wt 160 lb (72.6 kg)   SpO2 99%   BMI 31.78 kg/m²   General appearance: No apparent distress, appears stated age and cooperative. HEENT: Normal cephalic, atraumatic without obvious deformity. Pupils equal, round, and reactive to light. Extra ocular muscles intact. Conjunctivae/corneas clear. Neck: Supple, with full range of motion. No jugular venous distention. Trachea midline. Breast: Witness available while examination, right breast tenderness at the site of the drain, erythetamopus, tender on palpation, warmness at the area, mucopurulent discharge appreciated at the site of the drain. Discharge cultured. Respiratory:  Normal respiratory effort. Clear to auscultation, bilaterally without Rales/Wheezes/Rhonchi.   Cardiovascular: Regular rate and rhythm with normal S1/S2 without murmurs, rubs or gallops. Abdomen: Soft, non-tender, non-distended with normal bowel sounds. Musculoskeletal:  No clubbing, cyanosis or edema bilaterally. Skin: Skin color, texture, turgor normal.  No rashes or lesions. Neurologic:  Neurovascularly intact without any focal sensory/motor deficits. Cranial nerves: II-XII intact, grossly non-focal.  Psychiatric: Alert and oriented, thought content appropriate, normal insight  Capillary Refill: Brisk,< 3 seconds   Peripheral Pulses: +2 palpable, equal bilaterally     Labs:   Recent Labs     11/29/20  1651   WBC 14.3*   HGB 11.2*   HCT 33.2*   *     Recent Labs     11/29/20  1651   *   K 3.9   CREATININE 0.6     No results for input(s): AST, ALT, BILIDIR, BILITOT, ALKPHOS in the last 72 hours. No results for input(s): INR in the last 72 hours. No results for input(s): Donah Keens in the last 72 hours. Urinalysis:    No results found for: August Arcelia, BACTERIA, RBCUA, BLOODU, [address], [address]    Radiology:   [address]    (Results Pending)     No results found.           Electronically signed by Sergei Aguirre MD on 11/29/2020 at 7:27 PM

## 2020-11-30 NOTE — PROGRESS NOTES
Patient admitted to [phone] with vancomycin running,  Admission orders and room orientation gone over with patient   She voiced understanding,  No concerns noted at this time

## 2020-11-30 NOTE — ED NOTES
ED to inpatient nurses report    Chief Complaint   Patient presents with    Fever     103.2 last night    Wound Infection      Present to ED from home  LOC: alert and orientated to name, place, date  Vital signs   Vitals:    11/29/20 1627 11/29/20 1900   BP: 126/85 110/65   Pulse: 94 86   Resp: 16 18   Temp: 100.7 °F (38.2 °C)    TempSrc: Oral    SpO2: 96% 99%   Weight: 160 lb (72.6 kg)    Height: 4' 11.5\" (1.511 m)       Oxygen Baseline room air    Current needs required room air Bipap/Cpap No  LDAs:   Peripheral IV 11/29/20 Right Antecubital (Active)   Site Assessment Intact;Dry;Clean 11/29/20 1822   Line Status Blood return noted; Flushed;Normal saline locked 11/29/20 1822   Dressing Status Intact;Dry;Clean 11/29/20 1822   Dressing Intervention New 11/29/20 1822     Mobility: Independent  Pending ED orders: vanc  Present condition: pt resting on cot with unlabored respirations.   Person of contract pt, phone number demographics  Our promise was given to patient    Electronically signed by Del Ledesma RN on 11/29/2020 at 7:35 PM       [address] Rodrigo Barrera RN  11/29/20 3066

## 2020-11-30 NOTE — DISCHARGE SUMMARY
Hospital Medicine Discharge Summary      Patient Identification:   Marimar Cooper   : 1970  MRN: [id number]   Account: [de-identified]      Patient's PCP: Sherren Najjar, APRN - CNP    Admit Date: 2020     Discharge Date:   20    Admitting Physician: Jv Gustafson MD     Discharge Physician: Teddy Jones MD     Discharge Diagnoses: Active Hospital Problems    Diagnosis Date Noted    Cellulitis [L03.90] 2020       The patient was seen and examined on day of discharge and this discharge summary is in conjunction with any daily progress note from day of discharge. Hospital Course: This is a 82-year-old female presented with right breast pain with redness. She had reduction surgery as of 2020. She mentioned after the surgery she lost 2 units of blood been replaced and stayed almost 4 days here at our hospital and then she got discharged. Since then she has been feeling better. Although yesterday she started spiking fever with temp max 103 around 4 PM.  Since then she lost her appetite and felt fatigued with myalgia. Today she went to urgent care and received 1 shot of Rocephin before she came to our ER which was around 4:30 PM.  She is currently healthy not taking chronically any medications. Patient denies any nausea, vomiting, diarrhea, abdominal pain, shortness of breath, chest pain, loss of taste, loss of smell. In the ED her temperature was 100.7, leukocytosis 14.3 white blood cell count with left shift, procalcitonin elevated 0.69.  2 sets of blood cultures been drawn. Culture to the mucopurulent discharge of the right breast obtained. I ordered 1 g of vancomycin in ED. We will admit for possible bacteremia, cellulitis/mastitis of the right breast.     Blood cultures remained negative. Early culture reports show Staphylococcus and corynebacterium (suspect contaminant).  She felt much better on day of discharge, ambulating around the room without light headedness, breast discomfort significantly improved, erythema improved per patient, tenderness also improved. She was seen by plastic surgery who will follow up with her outpatient. They were ok with discharge today. Will discharge with bactrim DS PO BID 13 days for 14 total days abx. Procal trending down. Exam:     Vitals:  Vitals:    11/30/20 0302 11/30/20 0801 11/30/20 1400 11/30/20 1432   BP: (!) 79/47 (!) 94/52  117/65   Pulse: 76 89  84   Resp: 16 16  16   Temp: 98.4 °F (36.9 °C) 99.5 °F (37.5 °C)  98.6 °F (37 °C)   TempSrc: Oral Oral  Oral   SpO2: 99% 96% 98% 99%   Weight:       Height:         Weight: Weight: 168 lb 8 oz (76.4 kg)     24 hour intake/output:    Intake/Output Summary (Last 24 hours) at 11/30/2020 1612  Last data filed at 11/30/2020 1403  Gross per 24 hour   Intake 2364.44 ml   Output 1565 ml   Net 799.44 ml         General appearance:  No apparent distress, appears stated age and cooperative. HEENT:  Normal cephalic, atraumatic without obvious deformity. Pupils equal, round, and reactive to light. Extra ocular muscles intact. Conjunctivae/corneas clear. Neck: Supple, with full range of motion. No jugular venous distention. Trachea midline. Respiratory:  Normal respiratory effort. Clear to auscultation, bilaterally without Rales/Wheezes/Rhonchi. Cardiovascular:  Regular rate and rhythm with normal S1/S2 without murmurs, rubs or gallops. Abdomen: Soft, non-tender, non-distended with normal bowel sounds. Breast: mild erythema within demarcation lines, mild tenderness to palpation on right lateral breast (improved per patient), no warmth. Musculoskeletal:  No clubbing, cyanosis or edema bilaterally. Full range of motion without deformity. Skin: Skin color, texture, turgor normal.  No rashes or lesions. Neurologic:  Neurovascularly intact without any focal sensory/motor deficits.  Cranial nerves: II-XII intact, grossly non-focal.  Psychiatric:  Alert and oriented, thought content appropriate, normal insight  Capillary Refill: Brisk,< 3 seconds   Peripheral Pulses: +2 palpable, equal bilaterally       Labs: For convenience and continuity at follow-up the following most recent labs are provided:      CBC:    Lab Results   Component Value Date    WBC 12.3 11/30/2020    HGB 9.2 11/30/2020    HCT 29.2 11/30/2020     11/30/2020       Renal:    Lab Results   Component Value Date     11/30/2020    K 3.7 11/30/2020     11/30/2020    CO2 22 11/30/2020    BUN 9 11/30/2020    CREATININE 0.4 11/30/2020    CALCIUM 8.2 11/30/2020         Significant Diagnostic Studies    Radiology:   CT CHEST W CONTRAST   Final Result      Large subcutaneous fluid collections in the bilateral breasts, possibly postoperative seroma is but early abscess formation cannot be excluded. Final report electronically signed by Dr. Sharon Burton on 11/29/2020 7:55 PM             Consults:     STR ED TO IP CONSULT  IP CONSULT TO PLASTIC SURGERY  IP CONSULT TO PHARMACY    Disposition: Home  Condition at Discharge: Stable    Code Status:  Full Code     Patient Instructions:    Discharge lab work: none  Activity: activity as tolerated  Diet: DIET GENERAL; No Added Salt (3-4 GM)      Follow-up visits:   [facility] [place] [address]  [phone]             Discharge Medications:      Jorge Luis Burns   Home Medication Instructions [id number]    Printed on:11/30/20 1612   Medication Information                      Biotin 5000 MCG CAPS  Take 1 capsule by mouth daily             ibuprofen (ADVIL;MOTRIN) 200 MG tablet  Take 800 mg by mouth every 8 hours as needed              Multiple Vitamins-Minerals (THERAPEUTIC MULTIVITAMIN-MINERALS) tablet  Take 1 tablet by mouth daily             neomycin-bacitracin-polymyxin (NEOSPORIN) 5-400-5000 ointment  Apply topically 4 times daily.              Probiotic Acidophilus (FLORANEX) TABS  Take 1 tablet by mouth daily sulfamethoxazole-trimethoprim (BACTRIM DS;SEPTRA DS) 800-160 MG per tablet  Take 1 tablet by mouth 2 times daily for 13 days                 Time Spent on discharge is more than 30 minutes in the examination, evaluation, counseling and review of medications and discharge plan. Signed: Thank you ANJEL Teixeira CNP for the opportunity to be involved in this patient's care.     Electronically signed by Ana Lilia Vasques MD on 11/30/2020 at 4:12 PM

## 2020-11-30 NOTE — PROGRESS NOTES
Pharmacy Note  Vancomycin Consult    Colton Castleman is a 52 y.o. female started on Vancomycin for cellulitis/mastitits; consult received from Dr. Sandy León to manage therapy. Also receiving the following antibiotics: Rocephin (x1 in ED). Patient Active Problem List   Diagnosis    Hyperlipidemia    Obesity    Status post laparoscopic sleeve gastrectomy    Low back pain    Disorder of right sural nerve    Superficial peroneal nerve neuropathy    Abdominal pannus    Macromastia    Cellulitis       Allergies:  Celebrex [celecoxib]; Toradol [ketorolac tromethamine]; and Morphine     Temp max: 100.7    No results for input(s): BUN in the last 72 hours. Recent Labs     11/29/20  1651   CREATININE 0.6       Recent Labs     11/29/20  1651   WBC 14.3*       No intake or output data in the 24 hours ending 11/29/20 2031    Culture Date Source Results   11/29/20 Blood x 2    11/29/20 Breast tissue          Ht Readings from Last 1 Encounters:   11/29/20 4' 11.5\" (1.511 m)        Wt Readings from Last 1 Encounters:   11/29/20 160 lb (72.6 kg)         Body mass index is 31.78 kg/m². CrCl cannot be calculated (Unknown ideal weight.). Goal Trough Level: 10-20 mcg/mL    Assessment/Plan:  Will initiate Vancomycin with a one time loading dose of 1000 mg x1 (in ED at 2015), followed by 1250 mg IV every 12 hours. Timing of trough level will be determined based on culture results, renal function, and clinical response. Thank you for the consult. Will continue to follow.     Cheyenne Robles RP, BCPS, BCGP  11/29/2020     8:34 PM

## 2020-12-02 ENCOUNTER — OFFICE VISIT (OUTPATIENT)
Dept: SURGERY | Age: 50
End: 2020-12-02

## 2020-12-02 VITALS
WEIGHT: 172 LBS | SYSTOLIC BLOOD PRESSURE: 114 MMHG | BODY MASS INDEX: 34.16 KG/M2 | DIASTOLIC BLOOD PRESSURE: 73 MMHG | HEART RATE: 92 BPM | TEMPERATURE: 98.2 F

## 2020-12-02 LAB
AEROBIC CULTURE: ABNORMAL
AEROBIC CULTURE: ABNORMAL
ANAEROBIC CULTURE: ABNORMAL
GRAM STAIN RESULT: ABNORMAL
ORGANISM: ABNORMAL

## 2020-12-02 PROCEDURE — 99024 POSTOP FOLLOW-UP VISIT: CPT | Performed by: SURGERY

## 2020-12-02 RX ORDER — LIDOCAINE HYDROCHLORIDE AND EPINEPHRINE BITARTRATE 20; .01 MG/ML; MG/ML
6 INJECTION, SOLUTION SUBCUTANEOUS ONCE
Status: COMPLETED | OUTPATIENT
Start: 2020-12-02 | End: 2020-12-02

## 2020-12-02 RX ORDER — CEPHALEXIN 500 MG/1
500 CAPSULE ORAL 4 TIMES DAILY
Qty: 20 CAPSULE | Refills: 0 | Status: SHIPPED | OUTPATIENT
Start: 2020-12-02 | End: 2021-03-04 | Stop reason: ALTCHOICE

## 2020-12-02 RX ADMIN — LIDOCAINE HYDROCHLORIDE AND EPINEPHRINE BITARTRATE 6 ML: 20; .01 INJECTION, SOLUTION SUBCUTANEOUS at 14:00

## 2020-12-02 ASSESSMENT — ENCOUNTER SYMPTOMS
ABDOMINAL PAIN: 0
COUGH: 0
FACIAL SWELLING: 0
PHOTOPHOBIA: 0
SINUS PAIN: 0
SINUS PRESSURE: 0
TROUBLE SWALLOWING: 0
SHORTNESS OF BREATH: 0
COLOR CHANGE: 1

## 2020-12-02 NOTE — PROGRESS NOTES
Subjective:      Patient ID: Juno Dozier is a 52 y.o. female. Siva Cuenca returns for postoperative check following the bilateral reduction mammoplasty and panniculectomy performed 3 weeks ago. She had an interval hospitalization secondary to cellulitis of the right breast and had been started on a p.o. antibiotic on discharge. She returns to the office today with complaint of increasing swelling, redness, and pain in the right breast.      Review of Systems   Constitutional: Positive for chills and fever. Negative for fatigue and unexpected weight change. HENT: Negative for dental problem, facial swelling, nosebleeds, sinus pressure, sinus pain and trouble swallowing. Eyes: Negative for photophobia and visual disturbance. Respiratory: Negative for cough and shortness of breath. Cardiovascular: Negative for chest pain and leg swelling. Gastrointestinal: Negative for abdominal pain. Endocrine: Negative for cold intolerance and heat intolerance. Musculoskeletal: Negative for neck pain and neck stiffness. Skin: Positive for color change and wound. Negative for pallor and rash. Neurological: Negative for dizziness, facial asymmetry, light-headedness, numbness and headaches. Hematological: Does not bruise/bleed easily. Objective:   Physical Exam  Vitals signs and nursing note reviewed. Exam conducted with a chaperone present. Constitutional:       General: She is awake. She is not in acute distress. Appearance: Normal appearance. She is well-developed and normal weight. HENT:      Head: Normocephalic and atraumatic. Right Ear: External ear normal.      Left Ear: External ear normal.      Nose: Nose normal.      Mouth/Throat:      Mouth: Mucous membranes are moist.      Pharynx: Oropharynx is clear. Eyes:      General:         Right eye: No discharge. Left eye: No discharge. Extraocular Movements: Extraocular movements intact.       Conjunctiva/sclera: Conjunctivae normal.      Pupils: Pupils are equal, round, and reactive to light. Neck:      Musculoskeletal: Full passive range of motion without pain, normal range of motion and neck supple. Thyroid: No thyromegaly. Trachea: Trachea and phonation normal. No tracheal deviation. Cardiovascular:      Rate and Rhythm: Normal rate. Pulses: Normal pulses. No decreased pulses. Pulmonary:      Effort: Pulmonary effort is normal. No respiratory distress. Chest:          Comments: Seroma cath placement was performed under local anesthesia and using sterile technique. A total of 400 cc of cloudy serous fluid was removed. Abdominal:      General: Abdomen is flat. There is no distension. Palpations: Abdomen is soft. Tenderness: There is no abdominal tenderness. Musculoskeletal: Normal range of motion. General: No deformity. Skin:     General: Skin is warm. Capillary Refill: Capillary refill takes 2 to 3 seconds. Findings: No erythema or rash. Neurological:      General: No focal deficit present. Mental Status: She is alert and oriented to person, place, and time. Mental status is at baseline. Sensory: No sensory deficit. Psychiatric:         Mood and Affect: Mood normal.         Behavior: Behavior normal. Behavior is cooperative. Thought Content: Thought content normal.         Judgment: Judgment normal.         Assessment:      1. Postoperative from panniculectomy and bilateral breast reduction following massive weight loss from bariatric procedure. 2.  Cellulitis of the right breast with associated seroma      Plan:      I will add Keflex to the antibiotic regimen and monitor the seroma cath output. The drain on the abdomen continues to put out more than 80 cc/day and will be left in place. I will see her back in the office in 2 days.         Faiza Burr MD

## 2020-12-04 ENCOUNTER — OFFICE VISIT (OUTPATIENT)
Dept: SURGERY | Age: 50
End: 2020-12-04

## 2020-12-04 VITALS
HEART RATE: 79 BPM | TEMPERATURE: 98.1 F | WEIGHT: 165 LBS | SYSTOLIC BLOOD PRESSURE: 116 MMHG | BODY MASS INDEX: 32.77 KG/M2 | DIASTOLIC BLOOD PRESSURE: 73 MMHG

## 2020-12-04 DIAGNOSIS — Z09 POSTOP CHECK: Primary | ICD-10-CM

## 2020-12-04 PROCEDURE — 99024 POSTOP FOLLOW-UP VISIT: CPT | Performed by: SURGERY

## 2020-12-05 LAB
BLOOD CULTURE, ROUTINE: NORMAL
BLOOD CULTURE, ROUTINE: NORMAL

## 2020-12-09 ENCOUNTER — OFFICE VISIT (OUTPATIENT)
Dept: SURGERY | Age: 50
End: 2020-12-09

## 2020-12-09 VITALS — TEMPERATURE: 98.1 F | BODY MASS INDEX: 32.05 KG/M2 | WEIGHT: 161.4 LBS

## 2020-12-09 PROCEDURE — 99024 POSTOP FOLLOW-UP VISIT: CPT | Performed by: SURGERY

## 2020-12-15 ASSESSMENT — VISUAL ACUITY: OU: 1

## 2020-12-15 ASSESSMENT — ENCOUNTER SYMPTOMS
PHOTOPHOBIA: 0
ABDOMINAL PAIN: 0
TROUBLE SWALLOWING: 0
SINUS PAIN: 0
SHORTNESS OF BREATH: 0
SINUS PRESSURE: 0
COUGH: 0
VOMITING: 0
FACIAL SWELLING: 0
COLOR CHANGE: 0
NAUSEA: 0

## 2020-12-15 NOTE — PROGRESS NOTES
Left eye: No discharge. Extraocular Movements: Extraocular movements intact. Conjunctiva/sclera: Conjunctivae normal.      Pupils: Pupils are equal, round, and reactive to light. Neck:      Musculoskeletal: Full passive range of motion without pain, normal range of motion and neck supple. Thyroid: No thyromegaly. Trachea: Trachea and phonation normal. No tracheal deviation. Cardiovascular:      Rate and Rhythm: Normal rate. Pulses: Normal pulses. No decreased pulses. Pulmonary:      Effort: Pulmonary effort is normal. No respiratory distress. Chest:       Abdominal:      General: Abdomen is flat. A surgical scar is present. There is no distension. Palpations: Abdomen is soft. Tenderness: There is no abdominal tenderness. Musculoskeletal: Normal range of motion. General: No deformity. Skin:     General: Skin is warm. Capillary Refill: Capillary refill takes less than 2 seconds. Findings: No erythema or rash. Neurological:      General: No focal deficit present. Mental Status: She is alert and oriented to person, place, and time. Mental status is at baseline. Sensory: No sensory deficit. Psychiatric:         Mood and Affect: Mood normal.         Behavior: Behavior normal. Behavior is cooperative. Thought Content: Thought content normal.         Judgment: Judgment normal.         Assessment:      Postoperative check following bilateral reduction mammoplasty and panniculectomy. There is no evidence of seroma recollection. Plan:      Continue abdominal binder and surgical bra support. Recheck in 1 week.         Cortney Bailey MD

## 2020-12-30 ENCOUNTER — OFFICE VISIT (OUTPATIENT)
Dept: SURGERY | Age: 50
End: 2020-12-30

## 2020-12-30 PROCEDURE — 99024 POSTOP FOLLOW-UP VISIT: CPT | Performed by: SURGERY

## 2020-12-31 NOTE — PROGRESS NOTES
Extraocular Movements: Extraocular movements intact. Conjunctiva/sclera: Conjunctivae normal.      Pupils: Pupils are equal, round, and reactive to light. Neck:      Musculoskeletal: Full passive range of motion without pain, normal range of motion and neck supple. Thyroid: No thyromegaly. Trachea: Trachea and phonation normal. No tracheal deviation. Cardiovascular:      Rate and Rhythm: Normal rate. Pulses: Normal pulses. No decreased pulses. Pulmonary:      Effort: Pulmonary effort is normal. No respiratory distress. Chest:       Abdominal:      General: Abdomen is flat. There is no distension. Palpations: Abdomen is soft. Tenderness: There is no abdominal tenderness. Musculoskeletal: Normal range of motion. General: No deformity. Skin:     General: Skin is warm. Capillary Refill: Capillary refill takes less than 2 seconds. Findings: No erythema or rash. Neurological:      General: No focal deficit present. Mental Status: She is alert and oriented to person, place, and time. Mental status is at baseline. Sensory: No sensory deficit. Psychiatric:         Mood and Affect: Mood normal.         Behavior: Behavior normal. Behavior is cooperative. Thought Content: Thought content normal.         Judgment: Judgment normal.         Assessment:      Postoperative check from bilateral breast reduction and panniculectomy.   Doing very well      Plan:      Resume all activities  Follow up in 3 months        Kamila Atkins MD

## 2021-01-04 ENCOUNTER — HOSPITAL ENCOUNTER (OUTPATIENT)
Age: 51
Discharge: HOME | End: 2021-01-04
Payer: MEDICAID

## 2021-01-04 ENCOUNTER — HOSPITAL ENCOUNTER (OUTPATIENT)
Dept: GENERAL RADIOLOGY | Age: 51
Discharge: HOME | End: 2021-01-04
Payer: MEDICAID

## 2021-01-04 DIAGNOSIS — Z01.818 PREOP TESTING: ICD-10-CM

## 2021-01-04 DIAGNOSIS — Z87.891 FORMER SMOKER: ICD-10-CM

## 2021-01-04 LAB
ANION GAP SERPL CALCULATED.3IONS-SCNC: 13 MEQ/L (ref 8–16)
BACTERIA: NORMAL /HPF
BILIRUBIN URINE: NEGATIVE
BLOOD, URINE: NEGATIVE
BUN BLDV-MCNC: 23 MG/DL (ref 7–22)
CALCIUM SERPL-MCNC: 9.5 MG/DL (ref 8.5–10.5)
CASTS 2: NORMAL /LPF
CASTS UA: NORMAL /LPF
CHARACTER, URINE: CLEAR
CHLORIDE BLD-SCNC: 98 MEQ/L (ref 98–111)
CO2: 27 MEQ/L (ref 23–33)
COLOR: YELLOW
CREAT SERPL-MCNC: 0.5 MG/DL (ref 0.4–1.2)
CRYSTALS, UA: NORMAL
EPITHELIAL CELLS, UA: NORMAL /HPF
ERYTHROCYTE [DISTWIDTH] IN BLOOD BY AUTOMATED COUNT: 14.8 % (ref 11.5–14.5)
ERYTHROCYTE [DISTWIDTH] IN BLOOD BY AUTOMATED COUNT: 48.5 FL (ref 35–45)
GFR SERPL CREATININE-BSD FRML MDRD: > 90 ML/MIN/1.73M2
GLUCOSE BLD-MCNC: 98 MG/DL (ref 70–108)
GLUCOSE URINE: NEGATIVE MG/DL
HCT VFR BLD CALC: 39 % (ref 37–47)
HEMOGLOBIN: 12.2 GM/DL (ref 12–16)
KETONES, URINE: NEGATIVE
LEUKOCYTE ESTERASE, URINE: NEGATIVE
MCH RBC QN AUTO: 28 PG (ref 26–33)
MCHC RBC AUTO-ENTMCNC: 31.3 GM/DL (ref 32.2–35.5)
MCV RBC AUTO: 89.4 FL (ref 81–99)
MISCELLANEOUS 2: NORMAL
NITRITE, URINE: NEGATIVE
PH UA: 7 (ref 5–9)
PLATELET # BLD: 383 THOU/MM3 (ref 130–400)
PMV BLD AUTO: 9.1 FL (ref 9.4–12.4)
POTASSIUM SERPL-SCNC: 4.1 MEQ/L (ref 3.5–5.2)
PROTEIN UA: NEGATIVE
RBC # BLD: 4.36 MILL/MM3 (ref 4.2–5.4)
RBC URINE: NORMAL /HPF
RENAL EPITHELIAL, UA: NORMAL
SODIUM BLD-SCNC: 138 MEQ/L (ref 135–145)
SPECIFIC GRAVITY, URINE: 1.02 (ref 1–1.03)
UROBILINOGEN, URINE: 0.2 EU/DL (ref 0–1)
WBC # BLD: 7.3 THOU/MM3 (ref 4.8–10.8)
WBC UA: NORMAL /HPF
YEAST: NORMAL

## 2021-01-04 PROCEDURE — 85027 COMPLETE CBC AUTOMATED: CPT

## 2021-01-04 PROCEDURE — 36415 COLL VENOUS BLD VENIPUNCTURE: CPT

## 2021-01-04 PROCEDURE — 80048 BASIC METABOLIC PNL TOTAL CA: CPT

## 2021-01-04 PROCEDURE — 81001 URINALYSIS AUTO W/SCOPE: CPT

## 2021-01-04 PROCEDURE — 71046 X-RAY EXAM CHEST 2 VIEWS: CPT

## 2021-01-20 ENCOUNTER — OFFICE VISIT (OUTPATIENT)
Dept: SURGERY | Age: 51
End: 2021-01-20

## 2021-01-20 VITALS
BODY MASS INDEX: 33.22 KG/M2 | WEIGHT: 169.2 LBS | DIASTOLIC BLOOD PRESSURE: 91 MMHG | HEIGHT: 60 IN | TEMPERATURE: 97.5 F | SYSTOLIC BLOOD PRESSURE: 140 MMHG

## 2021-01-20 DIAGNOSIS — Z09 POSTOP CHECK: ICD-10-CM

## 2021-01-20 DIAGNOSIS — N62 MACROMASTIA: Primary | ICD-10-CM

## 2021-01-20 PROCEDURE — 99024 POSTOP FOLLOW-UP VISIT: CPT | Performed by: SURGERY

## 2021-01-20 ASSESSMENT — ENCOUNTER SYMPTOMS
FACIAL SWELLING: 0
PHOTOPHOBIA: 0
COUGH: 0
TROUBLE SWALLOWING: 0
ABDOMINAL PAIN: 0
COLOR CHANGE: 0
SINUS PRESSURE: 0
SHORTNESS OF BREATH: 0
SINUS PAIN: 0

## 2021-01-20 ASSESSMENT — VISUAL ACUITY: OU: 1

## 2021-01-20 NOTE — PROGRESS NOTES
Nose: Nose normal.      Mouth/Throat:      Mouth: Mucous membranes are moist.      Pharynx: Oropharynx is clear. Eyes:      General: Lids are normal. Vision grossly intact. Right eye: No discharge. Left eye: No discharge. Extraocular Movements: Extraocular movements intact. Conjunctiva/sclera: Conjunctivae normal.      Pupils: Pupils are equal, round, and reactive to light. Neck:      Musculoskeletal: Full passive range of motion without pain, normal range of motion and neck supple. Thyroid: No thyromegaly. Trachea: Trachea and phonation normal. No tracheal deviation. Cardiovascular:      Rate and Rhythm: Normal rate. Pulses: Normal pulses. No decreased pulses. Pulmonary:      Effort: Pulmonary effort is normal. No respiratory distress. Chest:       Abdominal:      General: Abdomen is flat. There is no distension. Palpations: Abdomen is soft. Tenderness: There is no abdominal tenderness. Musculoskeletal: Normal range of motion. General: No deformity. Skin:     General: Skin is warm. Capillary Refill: Capillary refill takes less than 2 seconds. Findings: No erythema or rash. Neurological:      General: No focal deficit present. Mental Status: She is alert and oriented to person, place, and time. Mental status is at baseline. Sensory: No sensory deficit. Psychiatric:         Mood and Affect: Mood normal.         Behavior: Behavior normal. Behavior is cooperative. Thought Content: Thought content normal.         Judgment: Judgment normal.           On this date 01/20/21 I have spent 15 minutes reviewing previous notes, test results and face to face with the patient discussing the diagnosis and importance of compliance with the treatment plan as well as documenting on the day of the visit. An electronic signature was used to authenticate this note.     --Kevin Pugh MD

## 2021-03-04 ENCOUNTER — OFFICE VISIT (OUTPATIENT)
Dept: BARIATRICS/WEIGHT MGMT | Age: 51
End: 2021-03-04
Payer: MEDICAID

## 2021-03-04 VITALS
HEIGHT: 60 IN | TEMPERATURE: 97.9 F | WEIGHT: 171 LBS | SYSTOLIC BLOOD PRESSURE: 116 MMHG | BODY MASS INDEX: 33.57 KG/M2 | DIASTOLIC BLOOD PRESSURE: 76 MMHG | HEART RATE: 76 BPM

## 2021-03-04 DIAGNOSIS — Z13.21 SCREENING FOR MALNUTRITION: ICD-10-CM

## 2021-03-04 DIAGNOSIS — Z98.84 STATUS POST LAPAROSCOPIC SLEEVE GASTRECTOMY: Primary | ICD-10-CM

## 2021-03-04 DIAGNOSIS — F41.9 ANXIETY: ICD-10-CM

## 2021-03-04 DIAGNOSIS — K91.2 POSTSURGICAL MALABSORPTION: ICD-10-CM

## 2021-03-04 DIAGNOSIS — F32.A DEPRESSION, UNSPECIFIED DEPRESSION TYPE: ICD-10-CM

## 2021-03-04 PROBLEM — G57.52 TARSAL TUNNEL SYNDROME, LEFT: Status: ACTIVE | Noted: 2020-09-15

## 2021-03-04 PROCEDURE — 99213 OFFICE O/P EST LOW 20 MIN: CPT | Performed by: PHYSICIAN ASSISTANT

## 2021-03-04 PROCEDURE — G8484 FLU IMMUNIZE NO ADMIN: HCPCS | Performed by: PHYSICIAN ASSISTANT

## 2021-03-04 PROCEDURE — G8417 CALC BMI ABV UP PARAM F/U: HCPCS | Performed by: PHYSICIAN ASSISTANT

## 2021-03-04 PROCEDURE — 1036F TOBACCO NON-USER: CPT | Performed by: PHYSICIAN ASSISTANT

## 2021-03-04 PROCEDURE — G8427 DOCREV CUR MEDS BY ELIG CLIN: HCPCS | Performed by: PHYSICIAN ASSISTANT

## 2021-03-04 RX ORDER — ACYCLOVIR 800 MG/1
TABLET ORAL
COMMUNITY
Start: 2021-01-08

## 2021-03-04 RX ORDER — TRAZODONE HYDROCHLORIDE 100 MG/1
100 TABLET ORAL NIGHTLY PRN
COMMUNITY
End: 2021-04-21

## 2021-03-04 RX ORDER — ALPHA LIPOIC ACID 200 MG
CAPSULE ORAL
COMMUNITY
Start: 2021-02-17 | End: 2021-07-15

## 2021-03-04 RX ORDER — SULFAMETHOXAZOLE AND TRIMETHOPRIM 800; 160 MG/1; MG/1
1 TABLET ORAL 2 TIMES DAILY
COMMUNITY
End: 2021-04-21

## 2021-03-04 RX ORDER — VENLAFAXINE HYDROCHLORIDE 150 MG/1
TABLET, EXTENDED RELEASE ORAL
COMMUNITY
Start: 2021-01-27 | End: 2022-02-16

## 2021-03-04 RX ORDER — PROPRANOLOL HYDROCHLORIDE 10 MG/1
TABLET ORAL
COMMUNITY
Start: 2021-01-25 | End: 2022-02-16

## 2021-03-04 RX ORDER — CRANBERRY FRUIT EXTRACT 200 MG
15000 CAPSULE ORAL
COMMUNITY

## 2021-03-04 NOTE — PATIENT INSTRUCTIONS
Stay well hydrated. Drink a minimum of 64 oz of non-carbonated, non-caffeinated fluids daily. Nutritional education occurred during visit. Tolerating diet. Continue following with dietitian and follow their recommendations as directed. Continue  60-80 grams of protein each day. Signs and symptoms reviewed with patient that would be concerning and need her to return to office for re-evaluation. Patient will call if any questions or concerns arrise. Importance of physical activity discussed with patient.    Increase physical activity as tolerated  Increase strength training, as tolerated  Start taking Multivitamin-Information given on CelebrateONE MV  Continue Calcium  Encouraged to attend support groups  2 year labs ordered- to be drawn prior to next apt  SECA scale next visit  Measurements next visit   Discussed 3 day pouch reset  Avoid trigger foods- ice cream  Offered more frequent appointments- will call if needed

## 2021-03-04 NOTE — PROGRESS NOTES
[facility]  [address]  [address] [facility]      Visit Date:  3/4/2021  Weight Management Postop Follow-up    HPI:      Jennie Lino is a 54 y.o. female who is here today for 1.5 year follow up since  robotic-assisted sleeve gastrectomy performed by Dr. Matty Lopez  on 8/12/19. Completed b/l breast reduction and panniculectomy with Dr. Shahnaz Ramirez on 11/9/20. Happy with results. Her dad passed away 12/11/21. Struggling with loss of her father. Has been following with [facility] [place] [place] and rx Trazadone and Venlafaxine. Continues to grieve. Had tarsal tunnel surgery 2/5/21 with Dr. Sarthak Zaidi. Continues to wear boot and use knee rover. Non-weight bearing currently. Will be having left tarsal tunnel surgery after right completely healed. Nutrition has been on and off with all the stressor in her life. Has continued to stay away from breads/rice/ pasta. Craving Ice cream and has been eating almost daily. Typically going through [employer] daily for a large ice cream cone. Plans to stop. Weight today 171#. Up 10# since last visit. Down 96# since surgery. BMI 33 . Drinking plenty of fluids. Getting plenty of protein. Drinking protein shakes about twice weekly. No carbonation. Tolerating post-op diet. No problems with bowel movements. No nausea. No emesis. No GERD/ Reflux. Denies CP/SOB. No Dizziness. No abdominal pain. Taking and tolerating all vitamins- calcium/ B12. Has not been taking MV for last 2-3 months. Information given today on CelebrateONE MV- plans to start asap. Physical Activity:None    Current BMI: Body mass index is 33.4 kg/m².   Current Weight:   Wt Readings from Last 3 Encounters:   03/04/21 171 lb (77.6 kg)   01/20/21 169 lb 3.2 oz (76.7 kg)   12/09/20 161 lb 6.4 oz (73.2 kg)     Pre-op Body [id number]      Past Medical History:  Past Medical History:   Diagnosis Date    Arthritis     Hyperlipidemia        Past Surgical History: Past Surgical History:   Procedure Laterality Date    ANKLE SURGERY Left 2006    ANKLE SURGERY Right 2019    plate and screws    BREAST REDUCTION SURGERY Bilateral 2020    BILATERAL BREAST MAMMOPLASTY REDUCTION performed by Fritz Means MD at [address] [facility]      FRACTURE SURGERY      HYSTERECTOMY, TOTAL ABDOMINAL      HYSTEROPLASTY      LIPECTOMY N/A 2020    PANNICULECTOMY performed by Fritz Means MD at [address] N/A 2019    ROBOTIC SLEEVE GASTRECTOMY performed by Merle Hallman MD at [address] N/A 2019    EGD WITH BX performed by Gloria Urrutia MD at [facility]    WRIST SURGERY Right     plate and screws       Past Social History:  Social History     Socioeconomic History    Marital status:      Spouse name: Not on file    Number of children: Not on file    Years of education: Not on file    Highest education level: Not on file   Occupational History    Not on file   Social Needs    Financial resource strain: Not on file    Food insecurity     Worry: Not on file     Inability: Not on file    Transportation needs     Medical: Not on file     Non-medical: Not on file   Tobacco Use    Smoking status: Former Smoker     Packs/day: 1.00     Types: Cigarettes     Quit date: 2004     Years since quittin.0    Smokeless tobacco: Never Used   Substance and Sexual Activity    Alcohol use: Yes     Comment: rare    Drug use: No    Sexual activity: Not on file   Lifestyle    Physical activity     Days per week: Not on file     Minutes per session: Not on file    Stress: Not on file   Relationships    Social connections     Talks on phone: Not on file     Gets together: Not on file     Attends Mandaeism service: Not on file     Active member of club or organization: Not on file     Attends meetings of clubs or organizations: Not on file     Relationship status: Not on file    Intimate partner violence     Fear of current or ex partner: Not on file     Emotionally abused: Not on file     Physically abused: Not on file     Forced sexual activity: Not on file   Other Topics Concern    Not on file   Social History Narrative    Not on file        Medications:   Current Outpatient Medications   Medication Sig Dispense Refill    traZODone (DESYREL) 100 MG tablet Take 100 mg by mouth nightly as needed for Sleep      acyclovir (ZOVIRAX) 800 MG tablet TAKE 1 TABLET BY MOUTH ONCE DAILY      Apple Cider Vinegar 300 MG TABS CIDER VINEGAR Apple Cider Vinegar 300 MG Oral Tablet 02/17/2021 Provider: 02-  [facility] ([id number])      B Complex Vitamins (B COMPLEX-B12) TABS B Complex-B12 Oral Tablet B Complex-B12 Oral Tablet 02/17/2021 Provider: 02-  [facility] 87 ([id number])      propranolol (INDERAL) 10 MG tablet       Cranberry 200 MG CAPS Take by mouth      Milk Thistle 1000 MG CAPS Take by mouth      Calcium 200 MG TABS Take by mouth      sulfamethoxazole-trimethoprim (BACTRIM DS;SEPTRA DS) 800-160 MG per tablet Take 1 tablet by mouth 2 times daily      Biotin 5000 MCG CAPS Take 1 capsule by mouth daily      venlafaxine 150 MG extended release tablet       neomycin-bacitracin-polymyxin (NEOSPORIN) 5-400-5000 ointment Apply topically 4 times daily. (Patient not taking: Reported on 3/4/2021) 1 Tube 1    Multiple Vitamins-Minerals (THERAPEUTIC MULTIVITAMIN-MINERALS) tablet Take 1 tablet by mouth daily       No current facility-administered medications for this visit. Allergies:    Allergies   Allergen Reactions    Celebrex [Celecoxib] Other (See Comments)     Patient states double vision    Toradol [Ketorolac Tromethamine]      Not sure of reaction    Morphine Rash       Subjective:    Review of Systems:  Constitutional: Denies any fever, chills, fatigue.(+)hair loss  Wound: Denies any rash, skin color changes or 11/30/2020    BILITOT 0.5 11/30/2020    ALT 6 11/30/2020        PREALBUMIN   Lab Results   Component Value Date    PREALBUMIN 27.0 08/28/2020        VITAMIN B12   Lab Results   Component Value Date    ITJBUHNO39 532 08/28/2020        24 HOUR URINE CALCIUM   No results found for: CHEIKH CardozaUR     VITAMIN D   Lab Results   Component Value Date    VITD25 47.80 08/28/2020        VITAMIN B1/ THIAMINE   Lab Results   Component Value Date    [id number] 120 11/19/2018        RBC FOLATE   Lab Results   Component Value Date    FOLATE 12.3 08/28/2020        LIPID SCREEN (FASTING)   Lab Results   Component Value Date    CHOL 262 08/28/2020    CHOL 167 11/19/2018    TRIG 140 08/28/2020    HDL 56 08/28/2020    LDLCALC 82 11/19/2018   ,     HGA1C (T2DM ONLY)   Lab Results   Component Value Date    LABA1C 5.8 08/28/2020    AVGG 117 11/19/2018        TSH   Lab Results   Component Value Date    TSH 3.560 11/19/2018        IRON   Lab Results   Component Value Date    IRON 69 11/19/2018        IONIZED CALCIUM   No results found for: MACKENZIE DALLAS      Assessment:       Diagnosis Orders   1. Status post laparoscopic sleeve gastrectomy  CBC Auto Differential    Comprehensive Metabolic Panel    Ferritin    Hemoglobin A1C    Iron    Iron Binding Capacity    Lipid Panel    PTH, Intact    Prealbumin    TSH with Reflex    Vitamin B1    Vitamin B12 & Folate    Vitamin D 25 Hydroxy   2. BMI 33.0-33.9,adult  CBC Auto Differential    Comprehensive Metabolic Panel    Ferritin    Hemoglobin A1C    Iron    Iron Binding Capacity    Lipid Panel    PTH, Intact    Prealbumin    TSH with Reflex    Vitamin B1    Vitamin B12 & Folate    Vitamin D 25 Hydroxy   3. Postsurgical malabsorption  CBC Auto Differential    Comprehensive Metabolic Panel    Ferritin    Hemoglobin A1C    Iron    Iron Binding Capacity    Lipid Panel    PTH, Intact    Prealbumin    TSH with Reflex    Vitamin B1    Vitamin B12 & Folate    Vitamin D 25 Hydroxy   4. Screening for malnutrition  CBC Auto Differential    Comprehensive Metabolic Panel    Ferritin    Hemoglobin A1C    Iron    Iron Binding Capacity    Lipid Panel    PTH, Intact    Prealbumin    TSH with Reflex    Vitamin B1    Vitamin B12 & Folate    Vitamin D 25 Hydroxy   5. Depression, unspecified depression type     6. Anxiety       Plan:    Stay well hydrated. Drink a minimum of 64 oz of non-carbonated, non-caffeinated fluids daily. Nutritional education occurred during visit. Tolerating diet. Continue following with dietitian and follow their recommendations as directed. Continue  60-80 grams of protein each day. Signs and symptoms reviewed with patient that would be concerning and need her to return to office for re-evaluation. Patient will call if any questions or concerns arrise. Importance of physical activity discussed with patient. Increase physical activity as tolerated  Increase strength training, as tolerated  Start taking Multivitamin-Information given on CelebrateONE MV  Continue Calcium  Encouraged to attend support groups  2 year labs ordered- to be drawn prior to next apt  SECA scale next visit  Measurements next visit   Discussed 3 day pouch reset  Avoid trigger foods- ice cream  Offered more frequent appointments- will call if needed  Continue following with Mariola Benavides, as scheduled  Return in about 6 months (around 9/4/2021) for postop follow up. I spent over 20 minutes with the patient, with greater that 50% of that time spent on face counseling for nutrition and exercise.     Electronically signed by TAINA Man on 3/4/2021 at 11:20 AM

## 2021-03-09 ENCOUNTER — HOSPITAL ENCOUNTER (OUTPATIENT)
Dept: PHYSICAL THERAPY | Age: 51
Setting detail: THERAPIES SERIES
Discharge: HOME | End: 2021-03-09
Payer: MEDICAID

## 2021-03-09 PROCEDURE — 97035 APP MDLTY 1+ULTRASOUND EA 15: CPT

## 2021-03-09 PROCEDURE — 97161 PT EVAL LOW COMPLEX 20 MIN: CPT

## 2021-03-09 NOTE — FLOWSHEET NOTE
** PLEASE SIGN, DATE AND TIME CERTIFICATION BELOW AND RETURN TO [facility] [place] OUTPATIENT REHABILITATION (FAX #: [phone]). ATTEST/CO-SIGN IF ACCESSING VIA [employer]Book[employer] Angel. THANK YOU.**    I certify that I have examined the patient below and determined that Physical Medicine and Rehabilitation service is necessary and that I approve the established plan of care for up to 90 days or as specifically noted. Attestation, signature or co-signature of physician indicates approval of certification requirements.    ________________________ ____________ __________  Physician Signature   Date   Time  7[address] [facility]  PHYSICAL THERAPY  [x] EVALUATION  [] DAILY NOTE (LAND) [] DAILY NOTE (AQUATIC ) [] PROGRESS NOTE [] DISCHARGE NOTE    [x] [facility]   [] [facility]     [] [facility]   [] Cuca Monday    Date: 3/9/2021  Patient Name:  Ann Marie Green  : 1970  MRN: [id number]  CSN: [id number]    Referring Practitioner ELIDA Ashraf*   Diagnosis RIGHT ANKLE     Treatment Diagnosis Right foot pain, Difficulty with ambulation, Balance deficits, Right ankle weakness   Date of Evaluation 3/9/21    Additional Pertinent History Surgery on right foot 2-3 years ago       Functional Outcome Measure Used FAAM   Functional Outcome Score 42. Rated 30% decreased function  (3/9/21)       Insurance: Primary: Payor: Jaquelyn Litten /  /  / ,   Secondary:    Authorization Information: No precert needed until after 30th visit. Aquatics and Modalities except Ionto and HP/CP [email or web address]. Telehealth covered. Visit # 1, 1/10 for progress note   Visits Allowed: 30   Recertification Date: 1-0-10   Physician Follow-Up: 2021   Physician Orders: Increase WB 25# each day until reaches her normal weight then over the following week start to ween off the rollator and then the week following that start to ween from the boot into a shoe.    History of Present Illness: Feb 5, 2021 had tarsal tunnel surgery. Has a permanent plate/screw in right ankle from a prior surgery. SUBJECTIVE: Patient reports stepped up onto a step ladder and she fell off the step ladder.  had testing done and was found to have nerve damage leading to right ankle surgery.  has the same thing going on in left ankle but to a lesser degree.  will have surgery on left ankle after right heals.  is doing well since surgery. Social/Functional History and Current Status:  Medications and Allergies have been reviewed and are listed on Medical History Questionnaire. Marquis Wilson lives with significant other in a single story home with a level surface to enter. Task Previous Current   ADLs  Independent Independent   IADL's Independent Modified Independent   Ambulation Independent Assistance Required   Transfers Independent Modified Independent   Recreation Independent Assistance Required   Community Integration Independent Assistance Required   Driving Active  Drives with self-imposed restrictions - No driving until out of the boot. Work Unemployed  Unemployed - Will find a job after has second surgery     OBJECTIVE:    Pain: 3/10 right foot   Palpation Mild to moderate tenderness right medial foot   Observation Incision healing - still has scabs along it. Red along the border of the incision but appears to be more to irritation and not infection. Posture Fair       Range of Motion Right ankle DF 5, PF 15, Inv 0, Ev 0. Left ankle DF 17, PF 41, Inv 40, Ev 10   Strength Right ankle 3 to 3+/5 throughout.  Left ankle 4+-5/5 throughout   Coordination    Sensation WFL right foot   Bed Mobility    Transfers    Ambulation Patient using Rollator for right leg now and not able to be FWB on right   Stairs Not tested - does not have at home   Balance Unsteady but unable to fully test due to cannot be FWB on right   Special Tests          TREATMENT   Precautions: No ankle DF and 20-25 degrees of right PF for ease with gait pattern and going up and down stairs as needed  3) Patient to demonstrate 4-5/5 strength in right ankle to return to doing work around the home  4) Patient to perform all balance activity with no more than one hand assist for safety with all community ambulation  5) Patient to be able to SLS on right for 2-3 seconds for improved stance phase during gait        Long Term Goals:  Time Frame: 12 weeks  1) Patient to be independent with home program to perform all daily activity with minimal to no difficulty or pain      Patient Education:   [x]  HEP/Education Completed: Plan of Care, Goals, HEP. See how feels after 100 [employer] Access Code:  []  No new Education completed  []  Reviewed Prior HEP      [x]  Patient verbalized and/or demonstrated understanding of education provided. []  Patient unable to verbalize and/or demonstrate understanding of education provided. Will continue education. []  Barriers to learning: None    PLAN:  Treatment Recommendations: Strengthening, Range of Motion, Balance Training, Functional Mobility Training, Endurance Training, Gait Training, Stair Training, Neuromuscular Re-education, Manual Therapy - Soft Tissue Mobilization, Pain Management, Home Exercise Program, Patient Education and Modalities    [x]  Plan of care initiated. Plan to see patient 2 times per week for 2 weeks then may increase to 3x/week for 10 weeks to address the treatment planned outlined above.   []  Continue with current plan of care  []  Modify plan of care as follows:    []  Hold pending physician visit  []  Discharge    Time In 1115   Time Out 1210   Timed Code Minutes: 0 min   Total Treatment Time: 55 min       Electronically Signed by: [place] [id number]

## 2021-03-15 ENCOUNTER — HOSPITAL ENCOUNTER (OUTPATIENT)
Dept: PHYSICAL THERAPY | Age: 51
Setting detail: THERAPIES SERIES
Discharge: HOME | End: 2021-03-15
Payer: MEDICAID

## 2021-03-15 PROCEDURE — 97110 THERAPEUTIC EXERCISES: CPT

## 2021-03-15 PROCEDURE — 97140 MANUAL THERAPY 1/> REGIONS: CPT

## 2021-03-15 PROCEDURE — 97035 APP MDLTY 1+ULTRASOUND EA 15: CPT

## 2021-03-15 NOTE — PROGRESS NOTES
[address] [facility]  PHYSICAL THERAPY  [] EVALUATION  [x] DAILY NOTE (LAND) [] DAILY NOTE (AQUATIC ) [] PROGRESS NOTE [] DISCHARGE NOTE    [x] OUTPATIENT REHABILITATION [facility]   [] [place] [address]    [] [facility]   [] Neelam Jurado    Date: 3/15/2021  Patient Name:  Sandie Julien  : 1970  MRN: [id number]  CSN: [id number]    Referring Practitioner Han Gamez., D*   Diagnosis RIGHT ANKLE     Treatment Diagnosis Right foot pain, Difficulty with ambulation, Balance deficits, Right ankle weakness   Date of Evaluation 3/9/21    Additional Pertinent History Surgery on right foot 2-3 years ago       Functional Outcome Measure Used FAAM   Functional Outcome Score 42. Rated 30% decreased function  (3/9/21)       Insurance: Primary: Payor: [employer] /  /  / ,   Secondary:    Authorization Information: No precert needed until after 30th visit. Aquatics and Modalities except Ionto and HP/CP [email or web address]. Telehealth covered. Visit # 2, 2/10 for progress note   Visits Allowed:    Recertification Date: 3-5-12   Physician Follow-Up: 2021   Physician Orders: Increase WB 25# each day until reaches her normal weight then over the following week start to ween off the rollator and then the week following that start to ween from the boot into a shoe. History of Present Illness: 2021 had tarsal tunnel surgery. Has a permanent plate/screw in right ankle from a prior surgery. SUBJECTIVE: Patient presents without AD, wearing walking boot. Pt notes pain in right medial ankle/heel with increased walking. Pt notes ultrasound felt good last session. Pt weaned off rollabout but being cautious and taking things easy. TREATMENT   Precautions: No driving.  See above for WB restrictions   Pain: 3-4/10 right foot    X in shaded column indicates activity completed today   Modalities Parameters/  Location  Notes   US with hydrocortisone cream to right medial foot at arch and heel avoiding incisions 3.3 MHz, 0.4 W/cm2, 50% pulsed for 8 minutes X                Manual Therapy Time/Technique  Notes   Astym right lower leg 20 min x Soft tissue restriction in achilles, plantar fascia, great toe, arch, ankle mortise               Exercise/Intervention   Notes   Educated on HEP: ankle DF/PF, has little to no range for ankle Inv/Ev and circles. Alphabet, seated heelt/toe raises, toe curls with towel, ankle DF stretch with towel and Inv/Ev stretch with towel, standing ankle stretch with right foot back and all weight on left        Ankle tband pf/df 10 orange x    Seated wooden BAPS board- F/B, circles CW/CCW 10  x    Seated R heel/toe raises on foam 10  x                                                       Specific Interventions Next Treatment: US with hydrocortisone cream, ASTYM, manual therapy, stretches and ROM, strengthening, gait and balance    Activity/Treatment Tolerance:  [x]  Patient tolerated treatment well  []  Patient limited by fatigue  []  Patient limited by pain   []  Patient limited by medical complications  []  Other:     Assessment: Initiated Astym treatment to right lower leg with restrictions as noted above- used to breakdown fibrotic tissue and promote healthy tissue healing. Followed with phonophoresis and introduced ankle pf/df tband, wooden BAPS board and seated heel/toe raises on foam with good tolerance. Pt reports soreness but feels good at end of session.      GOALS:  Patient Goal: To walk without any assistance    Short Term Goals:  Time Frame: 4 weeks   1) Patient to start working towards normal heel to toe gait pattern in a regular shoe on the right without compensation to do all shopping  2) Patient to demonstrate 10 degrees right ankle DF and 20-25 degrees of right PF for ease with gait pattern and going up and down stairs as needed  3) Patient to demonstrate 4-5/5 strength in right ankle to return to doing work around the home 4) Patient to perform all balance activity with no more than one hand assist for safety with all community ambulation  5) Patient to be able to SLS on right for 2-3 seconds for improved stance phase during gait        Long Term Goals:  Time Frame: 12 weeks  1) Patient to be independent with home program to perform all daily activity with minimal to no difficulty or pain      Patient Education:   [x]  HEP/Education Completed: ankle tband pf/df, drink extra fluids today, monitor response to Astym   Medbridge Access Code:  []  No new Education completed  []  Reviewed Prior HEP      [x]  Patient verbalized and/or demonstrated understanding of education provided. []  Patient unable to verbalize and/or demonstrate understanding of education provided. Will continue education. []  Barriers to learning: None    PLAN:  Treatment Recommendations: Strengthening, Range of Motion, Balance Training, Functional Mobility Training, Endurance Training, Gait Training, Stair Training, Neuromuscular Re-education, Manual Therapy - Soft Tissue Mobilization, Pain Management, Home Exercise Program, Patient Education and Modalities    []  Plan of care initiated. Plan to see patient 2 times per week for 2 weeks then may increase to 3x/week for 10 weeks to address the treatment planned outlined above.   [x]  Continue with current plan of care  []  Modify plan of care as follows:    []  Hold pending physician visit  []  Discharge    Time In 1447   Time Out 1532   Timed Code Minutes: 45 min   Total Treatment Time: 45 min       Electronically Signed by: [employer]

## 2021-03-18 ENCOUNTER — HOSPITAL ENCOUNTER (OUTPATIENT)
Dept: PHYSICAL THERAPY | Age: 51
Setting detail: THERAPIES SERIES
Discharge: HOME | End: 2021-03-18
Payer: MEDICAID

## 2021-03-18 PROCEDURE — 97110 THERAPEUTIC EXERCISES: CPT

## 2021-03-18 PROCEDURE — 97140 MANUAL THERAPY 1/> REGIONS: CPT

## 2021-03-18 PROCEDURE — 97035 APP MDLTY 1+ULTRASOUND EA 15: CPT

## 2021-03-18 NOTE — PROGRESS NOTES
7[address] [facility]  PHYSICAL THERAPY  [] EVALUATION  [x] DAILY NOTE (LAND) [] DAILY NOTE (AQUATIC ) [] PROGRESS NOTE [] DISCHARGE NOTE    [x] OUTPATIENT REHABILITATION CENTER [facility]   [] [place] [address]    [] [facility]   [] Christopher Marques    Date: 3/18/2021  Patient Name:  Brenda Kirkland  : 1970  MRN: [id number]  CSN: [id number]    Referring Practitioner ELIDA Barnes*   Diagnosis RIGHT ANKLE     Treatment Diagnosis Right foot pain, Difficulty with ambulation, Balance deficits, Right ankle weakness   Date of Evaluation 3/9/21    Additional Pertinent History Surgery on right foot 2-3 years ago       Functional Outcome Measure Used FAAM   Functional Outcome Score 42. Rated 30% decreased function  (3/9/21)       Insurance: Primary: Payor: [employer] /  /  / ,   Secondary:    Authorization Information: No precert needed until after 30th visit. Aquatics and Modalities except Ionto and HP/CP [email or web address]. Telehealth covered. Visit # 3, 3/10 for progress note   Visits Allowed:    Recertification Date: 89   Physician Follow-Up: 2021   Physician Orders: Increase WB 25# each day until reaches her normal weight then over the following week start to ween off the rollator and then the week following that start to ween from the boot into a shoe. History of Present Illness: 2021 had tarsal tunnel surgery. Has a permanent plate/screw in right ankle from a prior surgery. SUBJECTIVE:  Patient reports having increased medial ankle pain about 30 minutes after last therapy session. States the ultrasound does seem to help. Patient is using ice at home. No assistive device but is wearing boot. TREATMENT   Precautions: No driving.  See above for WB restrictions   Pain: -3/10 right foot \"irritable\"     X in shaded column indicates activity completed today   Modalities Parameters/  Location  Notes   Phonophoresis with hydrocortisone cream to right medial foot at arch and heel avoiding incisions 3.3 MHz, 0.4 W/cm2, 50% pulsed for 8 minutes X                Manual Therapy Time/Technique  Notes   Astym right lower leg 20 minutes X Soft tissue restriction in achilles, plantar fascia, great toe, arch, ankle mortise               Exercise/Intervention   Notes   Educated on HEP: ankle DF/PF, has little to no range for ankle Inv/Ev and circles. Alphabet, seated heelt/toe raises, toe curls with towel, ankle DF stretch with towel and Inv/Ev stretch with towel, standing ankle stretch with right foot back and all weight on left        Ankle tband pf/df 10x orange X    Seated wooden BAPS board- F/B, circles CW/CCW 10x  X    Seated Right heel/toe raises on foam 10x  X                                                       Specific Interventions Next Treatment: US with hydrocortisone cream, ASTYM, manual therapy, stretches and ROM, strengthening, gait and balance    Activity/Treatment Tolerance:  [x]  Patient tolerated treatment well  []  Patient limited by fatigue  []  Patient limited by pain   []  Patient limited by medical complications  []  Other:     Assessment: Continued Astym treatment to right lower leg with restrictions as noted above to breakdown fibrotic tissue and promote healthy tissue healing. Followed with phonophoresis to decrease pain and inflammation. Patient felt good at end of session but was a little sore. Difficulty noted with dorsiflexion.       GOALS:  Patient Goal: To walk without any assistance    Short Term Goals:  Time Frame: 4 weeks   1) Patient to start working towards normal heel to toe gait pattern in a regular shoe on the right without compensation to do all shopping  2) Patient to demonstrate 10 degrees right ankle DF and 20-25 degrees of right PF for ease with gait pattern and going up and down stairs as needed  3) Patient to demonstrate 4-5/5 strength in right ankle to return to doing work around the home  4) Patient to perform all balance activity with no more than one hand assist for safety with all community ambulation  5) Patient to be able to SLS on right for 2-3 seconds for improved stance phase during gait        Long Term Goals:  Time Frame: 12 weeks  1) Patient to be independent with home program to perform all daily activity with minimal to no difficulty or pain      Patient Education:   [x]  HEP/Education Completed: ankle tband pf/df, drink extra fluids today, monitor response to Astym   Medbridge Access Code:  []  No new Education completed  []  Reviewed Prior HEP      [x]  Patient verbalized and/or demonstrated understanding of education provided. []  Patient unable to verbalize and/or demonstrate understanding of education provided. Will continue education. []  Barriers to learning: None    PLAN:  Treatment Recommendations: Strengthening, Range of Motion, Balance Training, Functional Mobility Training, Endurance Training, Gait Training, Stair Training, Neuromuscular Re-education, Manual Therapy - Soft Tissue Mobilization, Pain Management, Home Exercise Program, Patient Education and Modalities    []  Plan of care initiated. Plan to see patient 2 times per week for 2 weeks then may increase to 3x/week for 10 weeks to address the treatment planned outlined above.   [x]  Continue with current plan of care  []  Modify plan of care as follows:    []  Hold pending physician visit  []  Discharge    Time In 1015   Time Out 1100   Timed Code Minutes: 45 min   Total Treatment Time: 45 min       Electronically Signed by: Maria Luz Grant

## 2021-03-22 ENCOUNTER — HOSPITAL ENCOUNTER (OUTPATIENT)
Dept: PHYSICAL THERAPY | Age: 51
Setting detail: THERAPIES SERIES
Discharge: HOME | End: 2021-03-22
Payer: MEDICAID

## 2021-03-22 PROCEDURE — 97110 THERAPEUTIC EXERCISES: CPT

## 2021-03-22 PROCEDURE — 97035 APP MDLTY 1+ULTRASOUND EA 15: CPT

## 2021-03-22 PROCEDURE — 97112 NEUROMUSCULAR REEDUCATION: CPT

## 2021-03-22 NOTE — PROGRESS NOTES
[address] [facility]  PHYSICAL THERAPY  [] EVALUATION  [x] DAILY NOTE (LAND) [] DAILY NOTE (AQUATIC ) [] PROGRESS NOTE [] DISCHARGE NOTE    [x] OUTPATIENT REHABILITATION [facility]   [] [place] [address]    [] [facility]   [] Justin Mo    Date: 3/22/2021  Patient Name:  Reza Wylie  : 1970  MRN: [id number]  CSN: [id number]    Referring Practitioner Sloan Wong., D*   Diagnosis RIGHT ANKLE     Treatment Diagnosis Right foot pain, Difficulty with ambulation, Balance deficits, Right ankle weakness   Date of Evaluation 3/9/21    Additional Pertinent History Surgery on right foot 2-3 years ago       Functional Outcome Measure Used FAAM   Functional Outcome Score 42. Rated 30% decreased function  (3/9/21)       Insurance: Primary: Payor: Juan Light /  /  / ,   Secondary:    Authorization Information: No precert needed until after 30th visit. Aquatics and Modalities except Ionto and HP/CP [email or web address]. Telehealth covered. Visit # 4, 4/10 for progress note   Visits Allowed:    Recertification Date:    Physician Follow-Up: 2021   Physician Orders: Increase WB 25# each day until reaches her normal weight then over the following week start to ween off the rollator and then the week following that start to ween from the boot into a shoe. History of Present Illness: 2021 had tarsal tunnel surgery. Has a permanent plate/screw in right ankle from a prior surgery. SUBJECTIVE:  Patient reports may have overdone it yesterday.  went to [facility]. and did a lot of walking, especially yesterday.  had her boot on the whole time but still had some increased pain and swelling.  just got back in this morning.  today starts her being able to be full weight bearing through right leg without her boot on. TREATMENT   Precautions: No driving.  See above for WB restrictions   Pain: 4/10 right ankle    X in shaded column indicates activity completed today   Modalities Parameters/  Location  Notes   US to right medial ankle avoiding incisions 3.3 MHz, 0.8 W/cm2, 50% pulsed for 10 minutes X                Manual Therapy Time/Technique  Notes   Astym right lower leg 20 minutes  Soft tissue restriction in achilles, plantar fascia, great toe, arch, ankle mortise               Exercise/Intervention   Notes   Balance: feet side by side                 Step stance each way                 Tandem stance way 30 sec  30 sec each  30 sec each  X  X  X     Needed light touches to bars   Ankle tband pf/df 10x orange     Seated wooden BAPS board- F/B, circles CW/CCW, side/side 10x  X    Seated Right heel/toe raises on foam 10x  X    Seated Rockerboard 2 directions bilat feet 10-15x each way  X    Nu-step Level 2 seat 6/arms 9 5 minutes  X    Walking in // bars 2 laps forward slowing down speed and concentrating on heel to toe patten and even step length   X                                  Specific Interventions Next Treatment: US with hydrocortisone cream, ASTYM, manual therapy, stretches and ROM, strengthening, gait and balance    Activity/Treatment Tolerance:  [x]  Patient tolerated treatment well  []  Patient limited by fatigue  []  Patient limited by pain   []  Patient limited by medical complications  []  Other:     Assessment: US to right medial ankle today decreased pain from 4/10-2/10. Able to start more WB activity in regular shoe today but did not add in too much new activity due to foot already sore from all the walking over the weekend. Patient's pain or swelling did not increase with new activity. Patient reported more trouble with left ankle today with balance and walking more than right. Could be due to left foot getting used to walking without the boot on the other foot.      GOALS:  Patient Goal: To walk without any assistance    Short Term Goals:  Time Frame: 4 weeks   1) Patient to start working towards normal heel

## 2021-03-25 ENCOUNTER — HOSPITAL ENCOUNTER (OUTPATIENT)
Dept: PHYSICAL THERAPY | Age: 51
Setting detail: THERAPIES SERIES
Discharge: HOME | End: 2021-03-25
Payer: MEDICAID

## 2021-03-25 PROCEDURE — 97035 APP MDLTY 1+ULTRASOUND EA 15: CPT

## 2021-03-25 PROCEDURE — 97110 THERAPEUTIC EXERCISES: CPT

## 2021-03-25 NOTE — PROGRESS NOTES
[address] [facility]  PHYSICAL THERAPY  [] EVALUATION  [x] DAILY NOTE (LAND) [] DAILY NOTE (AQUATIC ) [] PROGRESS NOTE [] DISCHARGE NOTE    [x] OUTPATIENT REHABILITATION CENTER [facility]   [] [facility]    [] [facility]   [] Melani Gibson    Date: 3/25/2021  Patient Name:  Ray Deutsch  : 1970  MRN: [id number]  CSN: [id number]    Referring Practitioner ELIDA Norman*   Diagnosis RIGHT ANKLE     Treatment Diagnosis Right foot pain, Difficulty with ambulation, Balance deficits, Right ankle weakness   Date of Evaluation 3/9/21    Additional Pertinent History Surgery on right foot 2-3 years ago       Functional Outcome Measure Used FAAM   Functional Outcome Score 42. Rated 30% decreased function  (3/9/21)       Insurance: Primary: Payor: Dorthula Cockayne /  /  / ,   Secondary:    Authorization Information: No precert needed until after 30th visit. Aquatics and Modalities except Ionto and HP/CP [id number]. Telehealth covered. Visit # 5, 5/10 for progress note   Visits Allowed:    Recertification Date: 98   Physician Follow-Up: 2021   Physician Orders: Increase WB 25# each day until reaches her normal weight then over the following week start to ween off the rollator and then the week following that start to ween from the boot into a shoe. History of Present Illness: 2021 had tarsal tunnel surgery. Has a permanent plate/screw in right ankle from a prior surgery. SUBJECTIVE:  Patient states that she got up in the middle of the night and her night light was off so she didn't see a stool. Patient tripped over the stool and fell causing increased pain in the foot and ankle. Patient has been using heat and ice. Patient arrived late to therapy session. Patient came in wearing shoe today. TREATMENT   Precautions: No driving.  See above for WB restrictions   Pain: 4/10 right ankle    X in shaded column indicates activity completed today   Modalities Parameters/  Location  Notes   Ultrasound to right medial ankle avoiding incisions 3.3 MHz, 0.8 W/cm2, 50% pulsed for 10 minutes X                Manual Therapy Time/Technique  Notes   Astym right lower leg 20 minutes  Soft tissue restriction in achilles, plantar fascia, great toe, arch, ankle mortise               Exercise/Intervention   Notes   Balance: Feet side by side                 Step stance each way                  Tandem stance way  30 seconds  30 seconds  30 seconds X  X  X     Needed light touches to bars   Balance on Foam:  Step stance   30 seconds X    Ankle tband pf/df 10x orange     Seated wooden BAPS board- F/B, circles CW/CCW, side/side 10x  X    Seated Right heel/toe raises on foam 10x  X    Seated Rockerboard 2 directions bilat feet 10-15x each way  X    NuStep Level 3 (seat 6/arms 9)  7 minutes X    Walking in // bars Forward slowing down speed and concentrating on heel to toe patten and even step length 2 laps  X    Sidestepping in // bars 2 laps  X                           Specific Interventions Next Treatment: US with hydrocortisone cream, ASTYM, manual therapy, stretches and ROM, strengthening, gait and balance    Activity/Treatment Tolerance:  [x]  Patient tolerated treatment well  []  Patient limited by fatigue  []  Patient limited by pain   []  Patient limited by medical complications  []  Other:     Assessment: Held Astym treatment today per patient request. Patient continued to be sore from overdoing it last weekend and then falling in the middle of the night. Patient able to complete above exercises without increased pain. Swelling noted throughout ankle but did not increased during therapy session. Ultrasound completed at end of session and decreased pain to 2/10.     GOALS:  Patient Goal: To walk without any assistance    Short Term Goals:  Time Frame: 4 weeks   1) Patient to start working towards normal heel to toe gait pattern in a regular shoe on the right without compensation to do all shopping  2) Patient to demonstrate 10 degrees right ankle DF and 20-25 degrees of right PF for ease with gait pattern and going up and down stairs as needed  3) Patient to demonstrate 4-5/5 strength in right ankle to return to doing work around the home  4) Patient to perform all balance activity with no more than one hand assist for safety with all community ambulation  5) Patient to be able to SLS on right for 2-3 seconds for improved stance phase during gait        Long Term Goals:  Time Frame: 12 weeks  1) Patient to be independent with home program to perform all daily activity with minimal to no difficulty or pain      Patient Education:   [x]  HEP/Education Completed: Continue with HEP. Use boot as needing while slowly getting used to being in a regular shoe.  [employer] Access Code:  []  No new Education completed  []  Reviewed Prior HEP      [x]  Patient verbalized and/or demonstrated understanding of education provided. []  Patient unable to verbalize and/or demonstrate understanding of education provided. Will continue education. []  Barriers to learning: None    PLAN:  []  Plan of care initiated. Plan to see patient 2 times per week for 2 weeks then may increase to 3x/week for 10 weeks to address the treatment planned outlined above.   [x]  Continue with current plan of care  []  Modify plan of care as follows:    []  Hold pending physician visit  []  Discharge    Time In 1522   Time Out 1600   Timed Code Minutes: 38 min   Total Treatment Time: 38 min       Electronically Signed by: Carrie Galloway

## 2021-03-29 ENCOUNTER — TELEPHONE (OUTPATIENT)
Dept: SURGERY | Age: 51
End: 2021-03-29

## 2021-03-29 ENCOUNTER — HOSPITAL ENCOUNTER (OUTPATIENT)
Dept: PHYSICAL THERAPY | Age: 51
Setting detail: THERAPIES SERIES
Discharge: HOME | End: 2021-03-29
Payer: MEDICAID

## 2021-03-29 PROCEDURE — 97035 APP MDLTY 1+ULTRASOUND EA 15: CPT

## 2021-03-29 PROCEDURE — 97140 MANUAL THERAPY 1/> REGIONS: CPT

## 2021-03-29 NOTE — PROGRESS NOTES
[address] [facility]  PHYSICAL THERAPY  [] EVALUATION  [x] DAILY NOTE (LAND) [] DAILY NOTE (AQUATIC ) [] PROGRESS NOTE [] DISCHARGE NOTE    [x] OUTPATIENT REHABILITATION [facility]   [] [place] [address]    [] [facility]   [] Carry Minder    Date: 3/29/2021  Patient Name:  Marquis Wilson  : 1970  MRN: [id number]  CSN: [id number]    Referring Practitioner Morris Worthy., D*   Diagnosis RIGHT ANKLE     Treatment Diagnosis Right foot pain, Difficulty with ambulation, Balance deficits, Right ankle weakness   Date of Evaluation 3/9/21    Additional Pertinent History Surgery on right foot 2-3 years ago       Functional Outcome Measure Used FAAM   Functional Outcome Score 42. Rated 30% decreased function  (3/9/21)       Insurance: Primary: Payor: Erika Aguirre /  /  / ,   Secondary:    Authorization Information: No precert needed until after 30th visit. Aquatics and Modalities except Ionto and HP/CP [id number]. Telehealth covered. Visit # 6, 6/10 for progress note   Visits Allowed:    Recertification Date: 57   Physician Follow-Up: 2021   Physician Orders: Increase WB 25# each day until reaches her normal weight then over the following week start to ween off the rollator and then the week following that start to ween from the boot into a shoe. History of Present Illness: 2021 had tarsal tunnel surgery. Has a permanent plate/screw in right ankle from a prior surgery. SUBJECTIVE:  Patient arrived 15 minutes late for therapy appointment today because of car issues. Patient wanting Astym and ultrasound treatment today. TREATMENT   Precautions: No driving.  See above for WB restrictions   Pain: 5/10 right ankle    X in shaded column indicates activity completed today   Modalities Parameters/  Location  Notes   Ultrasound to right medial ankle avoiding incisions 3.3 MHz, 0.8 W/cm2, 50% pulsed for 10 minutes X Manual Therapy Time/Technique  Notes   Astym right lower leg 20 minutes X Soft tissue restriction in achilles, plantar fascia, great toe, arch, ankle mortise               Exercise/Intervention   Notes   Balance: Feet side by side                 Step stance each way                  Tandem stance way  30 seconds  30 seconds  30 seconds        Needed light touches to bars   Balance on Foam:  Step stance   30 seconds     Ankle tband pf/df 10x orange     Seated wooden BAPS board- F/B, circles CW/CCW, side/side 10x      Seated Right heel/toe raises on foam 10x      Seated Rockerboard 2 directions bilat feet 10-15x each way      NuStep Level 3 (seat 6/arms 9)  7 minutes     Walking in // bars Forward slowing down speed and concentrating on heel to toe patten and even step length 2 laps      Sidestepping in // bars 2 laps                             Specific Interventions Next Treatment: US with hydrocortisone cream, ASTYM, manual therapy, stretches and ROM, strengthening, gait and balance    Activity/Treatment Tolerance:  [x]  Patient tolerated treatment well  []  Patient limited by fatigue  []  Patient limited by pain   []  Patient limited by medical complications  []  Other:     Assessment: Astym treatment completed to decrease fibrotic tissue and encourage healing of healthy tissue. Patient tolerated well. Ultrasound completed after Astym to decrease inflammation and pain. Patient rated pain 2/10 at end of session. Encouraged patient to work on stretches at home. Due to patient arriving late, did not have time in clinic to complete stretches.      GOALS:  Patient Goal: To walk without any assistance    Short Term Goals:  Time Frame: 4 weeks   1) Patient to start working towards normal heel to toe gait pattern in a regular shoe on the right without compensation to do all shopping  2) Patient to demonstrate 10 degrees right ankle DF and 20-25 degrees of right PF for ease with gait pattern and going up and down stairs as needed  3) Patient to demonstrate 4-5/5 strength in right ankle to return to doing work around the home  4) Patient to perform all balance activity with no more than one hand assist for safety with all community ambulation  5) Patient to be able to SLS on right for 2-3 seconds for improved stance phase during gait        Long Term Goals:  Time Frame: 12 weeks  1) Patient to be independent with home program to perform all daily activity with minimal to no difficulty or pain      Patient Education:   [x]  HEP/Education Completed: Continue with HEP. Use boot as needing while slowly getting used to being in a regular shoe.  [employer] Access Code:  []  No new Education completed  []  Reviewed Prior HEP      [x]  Patient verbalized and/or demonstrated understanding of education provided. []  Patient unable to verbalize and/or demonstrate understanding of education provided. Will continue education. []  Barriers to learning: None    PLAN:  []  Plan of care initiated. Plan to see patient 2 times per week for 2 weeks then may increase to 3x/week for 10 weeks to address the treatment planned outlined above.   [x]  Continue with current plan of care  []  Modify plan of care as follows:    []  Hold pending physician visit  []  Discharge    Time In 1400   Time Out 1430   Timed Code Minutes: 30 min   Total Treatment Time: 30 min       Electronically Signed by: Coral Hodges

## 2021-03-31 ENCOUNTER — HOSPITAL ENCOUNTER (OUTPATIENT)
Dept: PHYSICAL THERAPY | Age: 51
Setting detail: THERAPIES SERIES
Discharge: HOME | End: 2021-03-31
Payer: MEDICAID

## 2021-03-31 PROCEDURE — 97110 THERAPEUTIC EXERCISES: CPT

## 2021-03-31 PROCEDURE — 97140 MANUAL THERAPY 1/> REGIONS: CPT

## 2021-03-31 PROCEDURE — 97035 APP MDLTY 1+ULTRASOUND EA 15: CPT

## 2021-03-31 NOTE — PROGRESS NOTES
[address] [facility]  PHYSICAL THERAPY  [] EVALUATION  [x] DAILY NOTE (LAND) [] DAILY NOTE (AQUATIC ) [] PROGRESS NOTE [] DISCHARGE NOTE    [x] OUTPATIENT REHABILITATION CENTER [facility]   [] [place] [address]    [] [facility]   [] Von Mishra    Date: 3/31/2021  Patient Name:  Denver Griffins  : 1970  MRN: [id number]  CSN: [id number]    Referring Practitioner ELIDA Watson*   Diagnosis RIGHT ANKLE     Treatment Diagnosis Right foot pain, Difficulty with ambulation, Balance deficits, Right ankle weakness   Date of Evaluation 3/9/21    Additional Pertinent History Surgery on right foot 2-3 years ago       Functional Outcome Measure Used FAAM   Functional Outcome Score 42. Rated 30% decreased function  (3/9/21)       Insurance: Primary: Payor: Karan Poole /  /  / ,   Secondary:    Authorization Information: No precert needed until after 30th visit. Aquatics and Modalities except Ionto and HP/CP [id number]. Telehealth covered. Visit # 7, 7/10 for progress note   Visits Allowed:    Recertification Date: 14   Physician Follow-Up: 2021   Physician Orders: Increase WB 25# each day until reaches her normal weight then over the following week start to ween off the rollator and then the week following that start to ween from the boot into a shoe. History of Present Illness: 2021 had tarsal tunnel surgery. Has a permanent plate/screw in right ankle from a prior surgery. SUBJECTIVE:  Pt reports having increased swelling in foot after being up on it prolonged periods or doing exercises; however she doesn't notice it until she sits down. Pt has suture working way out of incision and pt reports doctor is going to remove it. Pt is going to [facility] next week on vacation. TREATMENT   Precautions: No driving.  See above for WB restrictions   Pain: 3-4/10 right ankle    X in shaded column indicates activity completed today Modalities Parameters/  Location  Notes   Ultrasound to right medial ankle avoiding incisions 1.0-3.3 MHz, 0.8 W/cm2, 50% pulsed for 8 minutes X                Manual Therapy Time/Technique  Notes   Astym right lower leg 20 minutes X Soft tissue restriction in achilles, plantar fascia, great toe, arch, ankle mortise, medial ankle               Exercise/Intervention   Notes   Balance on foam: Feet side by side                 Step stance each way                  Tandem stance way  30 seconds  30 seconds  30 seconds X  X  x     Needed light touches to bars   Ankle tband pf/df 10x orange     Seated wooden BAPS board- F/B, circles CW/CCW, side/side 10x      Seated Right heel/toe raises on foam 10x      Seated Rockerboard 2 directions bilat feet 10-15x each way      NuStep Level 3 (seat 6/arms 9)  7 minutes     Walking in // bars Forward slowing down speed and concentrating on heel to toe patten and even step length 2 laps  x    Sidestepping in // bars 2 laps  x    Standing heel/toe raises 10  x                    Specific Interventions Next Treatment: US with hydrocortisone cream, ASTYM, manual therapy, stretches and ROM, strengthening, gait and balance    Activity/Treatment Tolerance:  [x]  Patient tolerated treatment well  []  Patient limited by fatigue  []  Patient limited by pain   []  Patient limited by medical complications  []  Other:     Assessment: Continued with Astym and ultrasound with pain reduction to 2/10. Pt very sensitive around medial and lateral ankle and achilles tendon. Introduced balance on foam and standing heel/toe raises with slight pain in arch of foot. Pain continued to be less at end of session.      GOALS:  Patient Goal: To walk without any assistance    Short Term Goals:  Time Frame: 4 weeks   1) Patient to start working towards normal heel to toe gait pattern in a regular shoe on the right without compensation to do all shopping  2) Patient to demonstrate 10 degrees right ankle DF and 20-25 degrees of right PF for ease with gait pattern and going up and down stairs as needed  3) Patient to demonstrate 4-5/5 strength in right ankle to return to doing work around the home  4) Patient to perform all balance activity with no more than one hand assist for safety with all community ambulation  5) Patient to be able to SLS on right for 2-3 seconds for improved stance phase during gait        Long Term Goals:  Time Frame: 12 weeks  1) Patient to be independent with home program to perform all daily activity with minimal to no difficulty or pain      Patient Education:   [x]  HEP/Education Completed: wear ankle brace with increased walking, continue to elevate and ice with swelling   [employer] Access Code:  []  No new Education completed  []  Reviewed Prior HEP      [x]  Patient verbalized and/or demonstrated understanding of education provided. []  Patient unable to verbalize and/or demonstrate understanding of education provided. Will continue education. []  Barriers to learning: None    PLAN:  []  Plan of care initiated. Plan to see patient 2 times per week for 2 weeks then may increase to 3x/week for 10 weeks to address the treatment planned outlined above.   [x]  Continue with current plan of care  []  Modify plan of care as follows:    []  Hold pending physician visit  []  Discharge    Time In 1300   Time Out 1341   Timed Code Minutes: 41 min   Total Treatment Time: 41 min       Electronically Signed by: Meryle Plain

## 2021-04-02 ENCOUNTER — HOSPITAL ENCOUNTER (OUTPATIENT)
Dept: PHYSICAL THERAPY | Age: 51
Setting detail: THERAPIES SERIES
Discharge: HOME | End: 2021-04-02
Payer: MEDICAID

## 2021-04-02 PROCEDURE — 97035 APP MDLTY 1+ULTRASOUND EA 15: CPT

## 2021-04-02 PROCEDURE — 97140 MANUAL THERAPY 1/> REGIONS: CPT

## 2021-04-02 PROCEDURE — 97110 THERAPEUTIC EXERCISES: CPT

## 2021-04-02 NOTE — PROGRESS NOTES
[address] [facility]  PHYSICAL THERAPY  [] EVALUATION  [x] DAILY NOTE (LAND) [] DAILY NOTE (AQUATIC ) [] PROGRESS NOTE [] DISCHARGE NOTE    [x] OUTPATIENT REHABILITATION [facility]   [] [place] [address]    [] [facility]   [] Tri Mitchell    Date: 2021  Patient Name:  Genevieve Su  : 1970  MRN: [id number]  CSN: [id number]    Referring Practitioner Martin Dancer., D*   Diagnosis RIGHT ANKLE     Treatment Diagnosis Right foot pain, Difficulty with ambulation, Balance deficits, Right ankle weakness   Date of Evaluation 3/9/21    Additional Pertinent History Surgery on right foot 2-3 years ago       Functional Outcome Measure Used FAAM   Functional Outcome Score 42. Rated 30% decreased function  (3/9/21)       Insurance: Primary: Payor: Hazel Peterson /  /  / ,   Secondary:    Authorization Information: No precert needed until after 30th visit. Aquatics and Modalities except Ionto and HP/CP [id number]. Telehealth covered. Visit # 8, 8/10 for progress note   Visits Allowed:    Recertification Date: 3-2-16   Physician Follow-Up: 2021   Physician Orders: Increase WB 25# each day until reaches her normal weight then over the following week start to ween off the rollator and then the week following that start to ween from the boot into a shoe. History of Present Illness: 2021 had tarsal tunnel surgery. Has a permanent plate/screw in right ankle from a prior surgery. SUBJECTIVE:  Patient states she saw the doctor yesterday and was given another script to continue therapy but forgot to bring it with her today. Patient states he wrapped her ankle with ace wrap and told her to start doing that to help with the swelling. Patient feels that she over did it yesterday with walking and trying to get her toes straight. TREATMENT   Precautions: No driving.  See above for WB restrictions   Pain: 5/10 right ankle    X in shaded column indicates activity completed today   Modalities Parameters/  Location  Notes   Ultrasound to right medial ankle avoiding incisions 3.3 MHz, 0.8 W/cm2, 50% pulsed for 8 minutes X                Manual Therapy Time/Technique  Notes   Astym right lower leg 20 minutes X Soft tissue restriction in achilles, plantar fascia, great toe, arch, ankle mortise, medial ankle               Exercise/Intervention   Notes   Balance on foam:                  Feet side by side                 Step stance each way                  Tandem stance way    30 seconds  30 seconds  30 seconds   X  X  X       Needed light touches to bars   Ankle tband pf/df 10x orange     Seated wooden BAPS board- F/B, circles CW/CCW, side/side 10x      Seated Right heel/toe raises on foam 10x      Seated Rockerboard 2 directions bilat feet 10-15x each way      NuStep Level 3 (seat 6/arms 9)  7 minutes     Walking at rail in hallway Forward slowing down speed and concentrating on heel to toe patten and even step length 2 laps  X    Sidestepping at rail in hallway 2 laps  X    Standing heel/toe raises 15x  X                    Specific Interventions Next Treatment: US with hydrocortisone cream, ASTYM, manual therapy, stretches and ROM, strengthening, gait and balance    Activity/Treatment Tolerance:  [x]  Patient tolerated treatment well  []  Patient limited by fatigue  []  Patient limited by pain   []  Patient limited by medical complications  []  Other:     Assessment: Increased distance with walking and sidestepping today completing this activity in the hallway. Patient demonstrated moderate instability in the Left ankle with balance activities. Continued ultrasound to decrease pain and swelling. Encouraged patient to wrap her ankle and continue with HEP while on vacation next week in [place].      GOALS:  Patient Goal: To walk without any assistance    Short Term Goals:  Time Frame: 4 weeks   1) Patient to start working towards normal heel to toe gait pattern in a regular shoe on the right without compensation to do all shopping  2) Patient to demonstrate 10 degrees right ankle DF and 20-25 degrees of right PF for ease with gait pattern and going up and down stairs as needed  3) Patient to demonstrate 4-5/5 strength in right ankle to return to doing work around the home  4) Patient to perform all balance activity with no more than one hand assist for safety with all community ambulation  5) Patient to be able to SLS on right for 2-3 seconds for improved stance phase during gait        Long Term Goals:  Time Frame: 12 weeks  1) Patient to be independent with home program to perform all daily activity with minimal to no difficulty or pain      Patient Education:   [x]  HEP/Education Completed: Wear brace or wrap ankle as instructed by doctor, especially with increased walking, continue to elevate and ice with swelling   [facility] Access Code:  []  No new Education completed  []  Reviewed Prior HEP      [x]  Patient verbalized and/or demonstrated understanding of education provided. []  Patient unable to verbalize and/or demonstrate understanding of education provided. Will continue education. []  Barriers to learning: None    PLAN:  []  Plan of care initiated. Plan to see patient 2 times per week for 2 weeks then may increase to 3x/week for 10 weeks to address the treatment planned outlined above.   [x]  Continue with current plan of care  []  Modify plan of care as follows:    []  Hold pending physician visit  []  Discharge    Time In 1303   Time Out 1343   Timed Code Minutes: 40 min   Total Treatment Time: 40 min       Electronically Signed by: Dariela Saey

## 2021-04-05 ENCOUNTER — APPOINTMENT (OUTPATIENT)
Dept: PHYSICAL THERAPY | Age: 51
End: 2021-04-05
Payer: MEDICAID

## 2021-04-08 ENCOUNTER — APPOINTMENT (OUTPATIENT)
Dept: PHYSICAL THERAPY | Age: 51
End: 2021-04-08
Payer: MEDICAID

## 2021-04-13 ENCOUNTER — HOSPITAL ENCOUNTER (OUTPATIENT)
Dept: PHYSICAL THERAPY | Age: 51
Setting detail: THERAPIES SERIES
Discharge: HOME | End: 2021-04-13
Payer: MEDICAID

## 2021-04-13 PROCEDURE — 97035 APP MDLTY 1+ULTRASOUND EA 15: CPT

## 2021-04-13 PROCEDURE — 97110 THERAPEUTIC EXERCISES: CPT

## 2021-04-13 PROCEDURE — 97140 MANUAL THERAPY 1/> REGIONS: CPT

## 2021-04-13 NOTE — PROGRESS NOTES
[address] [facility]  PHYSICAL THERAPY  [] EVALUATION  [x] DAILY NOTE (LAND) [] DAILY NOTE (AQUATIC ) [] PROGRESS NOTE [] DISCHARGE NOTE    [x] OUTPATIENT REHABILITATION [facility]   [] [place] [address]    [] [facility]   [] [facility]    Date: 2021  Patient Name:  Cale Culp  : 1970  MRN: [id number]  CSN: [id number]    Referring Practitioner ELIDA Daily*   Diagnosis RIGHT ANKLE     Treatment Diagnosis Right foot pain, Difficulty with ambulation, Balance deficits, Right ankle weakness   Date of Evaluation 3/9/21    Additional Pertinent History Surgery on right foot 2-3 years ago       Functional Outcome Measure Used FAAM   Functional Outcome Score 42. Rated 30% decreased function  (3/9/21)       Insurance: Primary: Payor: Doug Doyle /  /  / ,   Secondary:    Authorization Information: No precert needed until after 30th visit. Aquatics and Modalities except Ionto and HP/CP [id number]. Telehealth covered. Visit # 9, 9/10 for progress note   Visits Allowed:    Recertification Date: 3-2-13   Physician Follow-Up: 2021   Physician Orders: Increase WB 25# each day until reaches her normal weight then over the following week start to ween off the rollator and then the week following that start to ween from the boot into a shoe. History of Present Illness: 2021 had tarsal tunnel surgery. Has a permanent plate/screw in right ankle from a prior surgery. SUBJECTIVE:  Patient was on vacation last week. States she had some increased swelling and even some bruising from the extra walking. Reports she elevated and iced to help with the swelling. Patient reports she has an open blister on her heel from a new pair of shoes. TREATMENT   Precautions: No driving.  See above for WB restrictions   Pain: 1/10 right ankle    X in shaded column indicates activity completed today   Modalities Parameters/  Location  Notes Ultrasound to right medial ankle avoiding incisions 3.3 MHz, 0.8 W/cm2, 50% pulsed for 8 minutes X                Manual Therapy Time/Technique  Notes   Astym right lower leg 20 minutes X Soft tissue restriction in achilles, plantar fascia, great toe, arch, ankle mortise, medial ankle               Exercise/Intervention   Notes   Balance on foam:                  Feet side by side                 Step stance each way                  Tandem stance way    30 seconds  30 seconds  30 seconds   X  X  X       Needed light touches to bars   Ankle tband pf/df 10x orange     Seated wooden BAPS board- F/B, circles CW/CCW, side/side 10x      Seated Right heel/toe raises on foam 10x      Seated Rockerboard 2 directions bilat feet 10-15x each way      NuStep Level 3 (seat 6/arms 9)  6 minutes X Warm-up prior to Astym   Walking at rail in hallway Forward slowing down speed and concentrating on heel to toe patten and even step length 2 laps  X    Sidestepping at rail in hallway 2 laps      Standing heel/toe raises 15x  X                    Specific Interventions Next Treatment: US with hydrocortisone cream, ASTYM, manual therapy, stretches and ROM, strengthening, gait and balance    Activity/Treatment Tolerance:  [x]  Patient tolerated treatment well  []  Patient limited by fatigue  []  Patient limited by pain   []  Patient limited by medical complications  []  Other:     Assessment: Continued Astym treatment to decrease fibrotic tissue and promote healing of healthy tissue. After being on vacation for a week, patient had increased restrictions today. Avoided blistered area on back of heel with Astym treatment. Patient felt sore but was more loose at end of session.     GOALS:  Patient Goal: To walk without any assistance    Short Term Goals:  Time Frame: 4 weeks   1) Patient to start working towards normal heel to toe gait pattern in a regular shoe on the right without compensation to do all shopping  2) Patient to demonstrate

## 2021-04-15 ENCOUNTER — HOSPITAL ENCOUNTER (OUTPATIENT)
Dept: PHYSICAL THERAPY | Age: 51
Setting detail: THERAPIES SERIES
Discharge: HOME | End: 2021-04-15
Payer: MEDICAID

## 2021-04-15 PROCEDURE — 97110 THERAPEUTIC EXERCISES: CPT

## 2021-04-15 NOTE — PROGRESS NOTES
[address] [facility]  PHYSICAL THERAPY  [] EVALUATION  [] DAILY NOTE (LAND) [] DAILY NOTE (AQUATIC ) [x] PROGRESS NOTE [] DISCHARGE NOTE    [x] OUTPATIENT REHABILITATION CENTER [facility]   [] [place] [address]    [] [facility]   [] Connie Pandya    Date: 4/15/2021  Patient Name:  Stephen Aldridge  : 1970  MRN: [id number]  CSN: [id number]    Referring Practitioner ELIDA Mason*   Diagnosis RIGHT ANKLE     Treatment Diagnosis Right foot pain, Difficulty with ambulation, Balance deficits, Right ankle weakness   Date of Evaluation 3/9/21    Additional Pertinent History Surgery on right foot 2-3 years ago       Functional Outcome Measure Used FAAM   Functional Outcome Score 21. Rated 30% of full function  (3/9/21), PN 46 and Rated 40% of full function (4-15-21)      Insurance: Primary: Payor: Ignacia Saucedo /  /  / ,   Secondary:    Authorization Information: No precert needed until after 30th visit. Aquatics and Modalities except Ionto and HP/CP [id number]. Telehealth covered. Visit # 10, 0/ for progress note   Visits Allowed: 30   Recertification Date:    Physician Follow-Up: 5-3-21   Physician Orders: FWB on right   History of Present Illness: 2021 had tarsal tunnel surgery. Has a permanent plate/screw in right ankle from a prior surgery. SUBJECTIVE:  Patient reports foot is slowly improving and she is starting to do more activity. Reports still limited with endurance and cannot walk more than 5-10 minutes without getting some increased pain and swelling. States saw the doctor and he was happy with progress and felt everything was healing well but that she could use more therapy. Patient reports still has a sore on her heel from walking in a certain pair of shoes on vacation. States feel the ASTYM is helping.     TREATMENT   Precautions: None   Pain: 3-4/10 right heel and on outside of foot/ankle    X in shaded column indicates improving and she needs less assist but still is not steady on her feet and needs further work. She is walking with more normalized gait but still wants to ER her right leg and walks very guarded. Patient would benefit from at least 6 more session s over 3 weeks to address these problems and then will re-assess to see if any further therapy needed to return patient to prior function. GOALS:  Patient Goal: To walk without any assistance    Short Term Goals:  Time Frame: 4 weeks   1) Patient to start working towards normal heel to toe gait pattern in a regular shoe on the right without compensation to do all shopping  [x] Goal Met [] Goal Not Met [] Continue Goal [] Discontinue Goal  [x] Revise Goal  Goal Assessment:  Patient able to walk without AD and in regular shoe with heel to toe motion and more even step lengths. Patient still wants to ER her right leg and is slow and guarded with walking to control her leg and ankle motion. New Goal: Patient to walk with smoother pattern and less guarding with foot and leg consistently pointing forward on right to do all shopping.   2) Patient to demonstrate 10 degrees right ankle DF and 20-25 degrees of right PF for ease with gait pattern and going up and down stairs as needed  [x] Goal Met [] Goal Not Met [] Continue Goal [] Discontinue Goal  [x] Revise Goal  Goal Assessment: Patient with 10 degrees on DF and 22 degrees PF  New Goal: Patient to demonstrate >10 degrees right ankle DF and 25 degrees of right PF for ease with gait pattern and going up and down stairs as needed  3) Patient to demonstrate 4-5/5 strength in right ankle to return to doing work around the home  [x] Goal Met [] Goal Not Met [] Continue Goal [] Discontinue Goal  [x] Revise Goal  New Goal: Patient to demonstrate 4+-5/5 strength in right ankle to return to doing work around the home  4) Patient to perform all balance activity with no more than one hand assist for safety with all community ambulation  [] Goal Met [x] Goal Not Met [x] Continue Goal [] Discontinue Goal  [] Revise Goal  Goal Assessment: Patient working towards improved balance and is starting to need less assist but still needs 2 hands occasionally  5) Patient to be able to SLS on right for 2-3 seconds for improved stance phase during gait    [] Goal Met [x] Goal Not Met [x] Continue Goal [] Discontinue Goal  [] Revise Goal  Goal Assessment: Patient able to do but only with assist      Long Term Goals:  Time Frame: 12 weeks  1) Patient to be independent with home program to perform all daily activity with minimal to no difficulty or pain  [] Goal Met [x] Goal Not Met [x] Continue Goal [] Discontinue Goal  [] Revise Goal  Goal Assessment: Patient still working on HEP      Patient Education:   [x]  HEP/Education Completed: Continue to work on [address] and increase activity as able. Keep working on keeping leg and foot in neutral with gait. Continued POC.  [employer] Access Code:  []  No new Education completed  []  Reviewed Prior HEP      [x]  Patient verbalized and/or demonstrated understanding of education provided. []  Patient unable to verbalize and/or demonstrate understanding of education provided. Will continue education. []  Barriers to learning: None    PLAN:  []  Plan of care initiated. Plan to see patient 2 times per week for 3 weeks to address the treatment planned outlined above.   []  Continue with current plan of care  [x]  Modify plan of care as follows:  See above  []  Hold pending physician visit  []  Discharge    Time In 1005   Time Out 1045   Timed Code Minutes: 40 min   Total Treatment Time: 40 min       Electronically Signed by: [place] [id number]

## 2021-04-20 ENCOUNTER — OFFICE VISIT (OUTPATIENT)
Dept: SURGERY | Age: 51
End: 2021-04-20
Payer: MEDICAID

## 2021-04-20 VITALS
WEIGHT: 186.2 LBS | HEART RATE: 70 BPM | HEIGHT: 60 IN | TEMPERATURE: 97.2 F | SYSTOLIC BLOOD PRESSURE: 131 MMHG | BODY MASS INDEX: 36.56 KG/M2 | DIASTOLIC BLOOD PRESSURE: 84 MMHG

## 2021-04-20 DIAGNOSIS — Z98.84 H/O BARIATRIC SURGERY: ICD-10-CM

## 2021-04-20 DIAGNOSIS — E65 PANNUS, ABDOMINAL: ICD-10-CM

## 2021-04-20 DIAGNOSIS — N62 MACROMASTIA: Primary | ICD-10-CM

## 2021-04-20 DIAGNOSIS — Z09 POSTOP CHECK: ICD-10-CM

## 2021-04-20 PROCEDURE — G8427 DOCREV CUR MEDS BY ELIG CLIN: HCPCS | Performed by: SURGERY

## 2021-04-20 PROCEDURE — 99213 OFFICE O/P EST LOW 20 MIN: CPT | Performed by: SURGERY

## 2021-04-20 PROCEDURE — 1036F TOBACCO NON-USER: CPT | Performed by: SURGERY

## 2021-04-20 PROCEDURE — G8417 CALC BMI ABV UP PARAM F/U: HCPCS | Performed by: SURGERY

## 2021-04-20 ASSESSMENT — ENCOUNTER SYMPTOMS
PHOTOPHOBIA: 0
SINUS PRESSURE: 0
SINUS PAIN: 0
SHORTNESS OF BREATH: 0
COLOR CHANGE: 0
ABDOMINAL PAIN: 0
TROUBLE SWALLOWING: 0
BACK PAIN: 0
COUGH: 0
FACIAL SWELLING: 0

## 2021-04-20 ASSESSMENT — VISUAL ACUITY: OU: 1

## 2021-04-20 NOTE — PROGRESS NOTES
Bria Chao (:  1970) is a 54 y.o. female,Established patient, here for evaluation of the following chief complaint(s):  Post-Op Check (4.5 mo  dos   breast reduction panniculectomy)      ASSESSMENT/PLAN:  S/P panniculectom  S/P bilateral reduction mammoplasty  No follow-ups on file. SUBJECTIVE/OBJECTIVE:  Archana Chaidez presents 4 1/2 months in follow up from bilateral reduction mammoplasty and panniculectomy. She is very happy with the shape and appearance of her abdomen. She notes significant improvement in the upper back, neck, shoulder and breast pain issues following the breast reduction. She states that she has gained weight due to activity restrictions following foot surgery but she is getting back into the gym. She still has some scarring at the T zone of each breast that is improving and softening. Review of Systems   Constitutional: Negative for appetite change, chills, fatigue, fever and unexpected weight change. HENT: Negative for dental problem, facial swelling, nosebleeds, sinus pressure, sinus pain and trouble swallowing. Eyes: Negative for photophobia and visual disturbance. Respiratory: Negative for cough and shortness of breath. Cardiovascular: Negative for chest pain and leg swelling. Gastrointestinal: Negative for abdominal pain. Endocrine: Negative for cold intolerance and heat intolerance. Musculoskeletal: Negative for arthralgias, back pain, neck pain and neck stiffness. Skin: Negative for color change, pallor, rash and wound. Neurological: Negative for dizziness, facial asymmetry, light-headedness, numbness and headaches. Hematological: Does not bruise/bleed easily. Physical Exam  Vitals signs and nursing note reviewed. Exam conducted with a chaperone present. Constitutional:       General: She is awake. She is not in acute distress. Appearance: Normal appearance. She is well-developed and well-groomed.    HENT:      Head: Normocephalic and atraumatic. Jaw: There is normal jaw occlusion. Right Ear: External ear normal.      Left Ear: External ear normal.      Nose: Nose normal.      Mouth/Throat:      Mouth: Mucous membranes are moist.      Pharynx: Oropharynx is clear. Eyes:      General: Lids are normal. Vision grossly intact. Right eye: No discharge. Left eye: No discharge. Extraocular Movements: Extraocular movements intact. Conjunctiva/sclera: Conjunctivae normal.      Pupils: Pupils are equal, round, and reactive to light. Neck:      Musculoskeletal: Full passive range of motion without pain, normal range of motion and neck supple. Thyroid: No thyromegaly. Trachea: Trachea and phonation normal. No tracheal deviation. Cardiovascular:      Rate and Rhythm: Normal rate. Pulses: Normal pulses. No decreased pulses. Pulmonary:      Effort: Pulmonary effort is normal. No respiratory distress. Chest:      Breasts: Lex Score is 5. Right: Normal. No swelling, bleeding, inverted nipple, mass, nipple discharge, skin change or tenderness. Left: Normal. No swelling, bleeding, inverted nipple, mass, nipple discharge, skin change or tenderness. Abdominal:      General: Abdomen is flat. There is no distension. Palpations: Abdomen is soft. There is no mass. Tenderness: There is no abdominal tenderness. Hernia: No hernia is present. Musculoskeletal: Normal range of motion. General: No deformity. Lymphadenopathy:      Upper Body:      Right upper body: No supraclavicular, axillary or pectoral adenopathy. Left upper body: No supraclavicular, axillary or pectoral adenopathy. Skin:     General: Skin is warm. Capillary Refill: Capillary refill takes less than 2 seconds. Findings: No erythema or rash. Neurological:      General: No focal deficit present. Mental Status: She is alert and oriented to person, place, and time. Sensory: No sensory deficit. Psychiatric:         Mood and Affect: Mood normal.         Behavior: Behavior normal. Behavior is cooperative. Thought Content: Thought content normal.           On this date 4/20/2021 I have spent 20 minutes reviewing previous notes, test results and face to face with the patient discussing the diagnosis and importance of compliance with the treatment plan as well as documenting on the day of the visit. An electronic signature was used to authenticate this note.     --Sarath Brown MD

## 2021-04-21 ENCOUNTER — OFFICE VISIT (OUTPATIENT)
Dept: SURGERY | Age: 51
End: 2021-04-21
Payer: MEDICAID

## 2021-04-21 ENCOUNTER — PREP FOR PROCEDURE (OUTPATIENT)
Dept: SURGERY | Age: 51
End: 2021-04-21

## 2021-04-21 VITALS
SYSTOLIC BLOOD PRESSURE: 110 MMHG | BODY MASS INDEX: 36.33 KG/M2 | RESPIRATION RATE: 18 BRPM | OXYGEN SATURATION: 98 % | DIASTOLIC BLOOD PRESSURE: 76 MMHG | TEMPERATURE: 97 F | WEIGHT: 186 LBS | HEART RATE: 76 BPM

## 2021-04-21 DIAGNOSIS — Z01.818 PRE-OP TESTING: ICD-10-CM

## 2021-04-21 DIAGNOSIS — L72.9 CYST OF BUTTOCKS: Primary | ICD-10-CM

## 2021-04-21 PROCEDURE — 1036F TOBACCO NON-USER: CPT | Performed by: SURGERY

## 2021-04-21 PROCEDURE — 99214 OFFICE O/P EST MOD 30 MIN: CPT | Performed by: SURGERY

## 2021-04-21 PROCEDURE — G8417 CALC BMI ABV UP PARAM F/U: HCPCS | Performed by: SURGERY

## 2021-04-21 PROCEDURE — G8427 DOCREV CUR MEDS BY ELIG CLIN: HCPCS | Performed by: SURGERY

## 2021-04-21 RX ORDER — SODIUM CHLORIDE 9 MG/ML
INJECTION, SOLUTION INTRAVENOUS CONTINUOUS
Status: CANCELLED | OUTPATIENT
Start: 2021-04-21

## 2021-04-21 RX ORDER — ZINC GLUCONATE 50 MG
50 TABLET ORAL DAILY
COMMUNITY
End: 2021-07-15

## 2021-04-22 ENCOUNTER — HOSPITAL ENCOUNTER (OUTPATIENT)
Dept: PHYSICAL THERAPY | Age: 51
Setting detail: THERAPIES SERIES
Discharge: HOME | End: 2021-04-22
Payer: MEDICAID

## 2021-04-22 ENCOUNTER — HOSPITAL ENCOUNTER (OUTPATIENT)
Age: 51
Discharge: HOME | End: 2021-04-22
Payer: MEDICAID

## 2021-04-22 DIAGNOSIS — Z01.818 PRE-OP TESTING: ICD-10-CM

## 2021-04-22 DIAGNOSIS — L72.9 CYST OF BUTTOCKS: ICD-10-CM

## 2021-04-22 LAB
ANION GAP SERPL CALCULATED.3IONS-SCNC: 11 MEQ/L (ref 8–16)
BUN BLDV-MCNC: 15 MG/DL (ref 7–22)
CALCIUM SERPL-MCNC: 9.7 MG/DL (ref 8.5–10.5)
CHLORIDE BLD-SCNC: 100 MEQ/L (ref 98–111)
CO2: 29 MEQ/L (ref 23–33)
CREAT SERPL-MCNC: 0.5 MG/DL (ref 0.4–1.2)
GFR SERPL CREATININE-BSD FRML MDRD: > 90 ML/MIN/1.73M2
GLUCOSE BLD-MCNC: 87 MG/DL (ref 70–108)
HCT VFR BLD CALC: 42.7 % (ref 37–47)
HEMOGLOBIN: 13.3 GM/DL (ref 12–16)
POTASSIUM SERPL-SCNC: 4.8 MEQ/L (ref 3.5–5.2)
SODIUM BLD-SCNC: 140 MEQ/L (ref 135–145)

## 2021-04-22 PROCEDURE — 97035 APP MDLTY 1+ULTRASOUND EA 15: CPT

## 2021-04-22 PROCEDURE — U0005 INFEC AGEN DETEC AMPLI PROBE: HCPCS

## 2021-04-22 PROCEDURE — 85014 HEMATOCRIT: CPT

## 2021-04-22 PROCEDURE — 36415 COLL VENOUS BLD VENIPUNCTURE: CPT

## 2021-04-22 PROCEDURE — 85018 HEMOGLOBIN: CPT

## 2021-04-22 PROCEDURE — 97110 THERAPEUTIC EXERCISES: CPT

## 2021-04-22 PROCEDURE — 97140 MANUAL THERAPY 1/> REGIONS: CPT

## 2021-04-22 PROCEDURE — U0003 INFECTIOUS AGENT DETECTION BY NUCLEIC ACID (DNA OR RNA); SEVERE ACUTE RESPIRATORY SYNDROME CORONAVIRUS 2 (SARS-COV-2) (CORONAVIRUS DISEASE [COVID-19]), AMPLIFIED PROBE TECHNIQUE, MAKING USE OF HIGH THROUGHPUT TECHNOLOGIES AS DESCRIBED BY CMS-2020-01-R: HCPCS

## 2021-04-22 PROCEDURE — 80048 BASIC METABOLIC PNL TOTAL CA: CPT

## 2021-04-22 ASSESSMENT — ENCOUNTER SYMPTOMS
DIARRHEA: 0
FACIAL SWELLING: 0
COLOR CHANGE: 0
VOICE CHANGE: 0
RHINORRHEA: 0
SHORTNESS OF BREATH: 0
BACK PAIN: 0
EYE REDNESS: 0
NAUSEA: 0
CHOKING: 0
PHOTOPHOBIA: 0
SINUS PRESSURE: 0
VOMITING: 0
WHEEZING: 0
RECTAL PAIN: 0
ABDOMINAL PAIN: 0
ALLERGIC/IMMUNOLOGIC NEGATIVE: 1
EYE PAIN: 0
EYE DISCHARGE: 0
COUGH: 0
APNEA: 0
BLOOD IN STOOL: 0
EYE ITCHING: 0
ANAL BLEEDING: 0
STRIDOR: 0
CHEST TIGHTNESS: 0
ABDOMINAL DISTENTION: 0
CONSTIPATION: 0
SORE THROAT: 0
TROUBLE SWALLOWING: 0

## 2021-04-22 NOTE — PROGRESS NOTES
tissue restriction in achilles, plantar fascia, great toe, arch, ankle mortise, medial ankle               Exercise/Intervention   Notes   Balance on foam:                  Feet side by side                 Step stance each way                  Tandem stance way    30 seconds  30 seconds  30 seconds              Needed light touches to bars   Ankle tband pf/df 10x orange     Seated wooden BAPS board- F/B, circles CW/CCW, side/side 10x      Seated Right heel/toe raises on foam 10x      Seated Rockerboard 2 directions bilat feet 10-15x each way      NuStep Level 3 (seat 6/arms 9)  6 minutes x Warm-up prior to Astym   Walking at rail in hallway Forward slowing down speed and concentrating on heel to toe patten and even step length 2 laps  x    Sidestepping at rail in hallway 2 laps  x    Standing heel/toe raises 15x  x    Discussed HEP and educated on what to continue with. Showed more standing activity can start on with marching, mini-squats and 3-way hip. Discussed IR with entire leg to help to get in more neutral position with gait                Specific Interventions Next Treatment: US with hydrocortisone cream, ASTYM, manual therapy, stretches and ROM, strengthening, gait and balance    Activity/Treatment Tolerance:  [x]  Patient tolerated treatment well  []  Patient limited by fatigue  []  Patient limited by pain   []  Patient limited by medical complications  []  Other:     Assessment: Resumed Astym and ultrasound with tenderness along medial and lateral malleoli, and medial heel. Pt felt instant relief from ultrasound. Addressed gait training with patient able to keep foot neutral when she consciously thinks about it. Pt notes foot felt better at end of session. GOALS:  Patient Goal: To walk without any assistance    Short Term Goals:  Time Frame: 4 weeks   1) Patient to walk with smoother pattern and less guarding with foot and leg consistently pointing forward on right to do all shopping.   2)  Patient to demonstrate >10 degrees right ankle DF and 25 degrees of right PF for ease with gait pattern and going up and down stairs as needed  3)  Patient to demonstrate 4+-5/5 strength in right ankle to return to doing work around the home  4) Patient to perform all balance activity with no more than one hand assist for safety with all community ambulation  5) Patient to be able to SLS on right for 2-3 seconds for improved stance phase during gait          Long Term Goals:  Time Frame: 12 weeks  1) Patient to be independent with home program to perform all daily activity with minimal to no difficulty or pain      Patient Education:   []  HEP/Education Completed: Continue to work on HEP and increase activity as able. Keep working on keeping leg and foot in neutral with gait. Continued POC.  [employer] Access Code:  []  No new Education completed  [x]  Reviewed Prior HEP      [x]  Patient verbalized and/or demonstrated understanding of education provided. []  Patient unable to verbalize and/or demonstrate understanding of education provided. Will continue education. []  Barriers to learning: None    PLAN:  []  Plan of care initiated. Plan to see patient 2 times per week for 3 weeks to address the treatment planned outlined above.   [x]  Continue with current plan of care  []  Modify plan of care as follows:  See above  []  Hold pending physician visit  []  Discharge    Time In 1416   Time Out 1501   Timed Code Minutes: 45 min   Total Treatment Time: 45 min       Electronically Signed by: August Farfan

## 2021-04-22 NOTE — PROGRESS NOTES
Covid nasopharyngeal swab obtained and sent to lab. Proper PPE worn during procedure. Pt tolerated well.

## 2021-04-22 NOTE — PROGRESS NOTES
Genevieve Su (:  1970)     ASSESSMENT:  1. 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 Crohn's disease    PLAN:  1. Schedule Claudia for anal exam under anesthesia with left medial buttock cyst excision; possible fistulotomy. 2. She will undergo pre-operative clearance per anesthesia guidelines with risk factors listed under the past medical history diagnosis & problem list.  3. The risks, benefits and alternatives were discussed with Birdie Valdez including non-operative management. All questions answered. She understands and wishes to proceed with surgical intervention. 4. Restrictions discussed with Birdie Valdez and she expresses understanding. 5. She is advised to call back directly if there are further questions/concerns, or if her symptoms worsen prior to surgery. 6. Stool softeners as needed  7. Sitz bath as needed    SUBJECTIVE/OBJECTIVE:    Chief Complaint   Patient presents with    Surgical Consult     Est patient-referred by Paula Sanchez. [place]-left buttock lesion,painful     HPI  Birdie Valdez is a 51-year-old female who presents for evaluation of a possible cyst on the left medial buttock that has been occurring now for the last several months. Intermittent drainage. Tender. About 1-2 in away from the anal opening. She acknowledges noticing this more after she had her sleeve gastrectomy and lost over 100 lbs. She admits at times the area is more swollen and tender along with inflammation. It does intermittently drain. Sometimes foul-smelling. Denies any new issues with bowel function. No significant constipation or diarrhea. No generalized abdominal pain. No distention. No nausea or vomiting. Tolerating diet. No significant heartburn/reflux. Recent right ankle surgery. No chest pain or shortness of breath. No fever, chills or sweats. Denies any trauma to the perianal region in the past. Admits to previous upper endoscopy and colonoscopy.  She states her colonoscopy was a few years ago but no real concerning findings. Admits to history of Crohn's disease. No history of perianal disease that she is aware of. Review of Systems   Constitutional: Negative for activity change, appetite change, chills, diaphoresis, fatigue, fever and unexpected weight change. HENT: Negative for congestion, dental problem, drooling, ear discharge, ear pain, facial swelling, hearing loss, mouth sores, nosebleeds, postnasal drip, rhinorrhea, sinus pressure, sneezing, sore throat, tinnitus, trouble swallowing and voice change. Eyes: Negative for photophobia, pain, discharge, redness, itching and visual disturbance. Respiratory: Negative for apnea, cough, choking, chest tightness, shortness of breath, wheezing and stridor. Cardiovascular: Negative for chest pain, palpitations and leg swelling. Gastrointestinal: Negative for abdominal distention, abdominal pain, anal bleeding, blood in stool, constipation, diarrhea, nausea, rectal pain and vomiting. Endocrine: Negative. Genitourinary: Negative for decreased urine volume, difficulty urinating, dyspareunia, dysuria, enuresis, flank pain, frequency, genital sores, hematuria, menstrual problem, pelvic pain, urgency, vaginal bleeding, vaginal discharge and vaginal pain. Musculoskeletal: Negative for arthralgias, back pain, gait problem, joint swelling, myalgias, neck pain and neck stiffness. Skin: Positive for wound. Negative for color change, pallor and rash. Allergic/Immunologic: Negative. Neurological: Positive for light-headedness and headaches. Negative for dizziness, tremors, seizures, syncope, facial asymmetry, speech difficulty, weakness and numbness. Hematological: Negative for adenopathy. Does not bruise/bleed easily. Psychiatric/Behavioral: Positive for dysphoric mood. Negative for agitation, behavioral problems, confusion, decreased concentration, hallucinations, self-injury, sleep disturbance and suicidal ideas. The patient is nervous/anxious.  The patient is not hyperactive. Past Medical History:   Diagnosis Date    Arthritis     Hyperlipidemia        Past Surgical History:   Procedure Laterality Date    ANKLE SURGERY Left 2006    ANKLE SURGERY Right 03/2019    plate and screws    BREAST REDUCTION SURGERY Bilateral 11/9/2020    BILATERAL BREAST MAMMOPLASTY REDUCTION performed by Uriah Markham MD at [place] [address], TOTAL ABDOMINAL      HYSTEROPLASTY      LIPECTOMY N/A 11/9/2020    PANNICULECTOMY performed by Uriah [facility]ham MD at [facility] N/A 8/12/2019    ROBOTIC SLEEVE GASTRECTOMY performed by Christy Vo MD at [address] [place] [address] N/A 2/18/2019    EGD WITH BX performed by Pedro Escobar MD at [facility]    WRIST SURGERY Right 2014    plate and screws       Current Outpatient Medications   Medication Sig Dispense Refill    Calcium Citrate-Vitamin D (CALCIUM CITRATE CHEWY BITE PO) Take 1 Dose by mouth daily      zinc gluconate 50 MG tablet Take 50 mg by mouth daily      acyclovir (ZOVIRAX) 800 MG tablet TAKE 1 TABLET BY MOUTH ONCE DAILY      Apple Cider Vinegar 300 MG TABS CIDER VINEGAR Apple Cider Vinegar 300 MG Oral Tablet 02/17/2021 Provider: 02-  [facility] ([id number])      B Complex Vitamins (B COMPLEX-B12) TABS B Complex-B12 Oral Tablet B Complex-B12 Oral Tablet 02/17/2021 Provider: 02-  [facility] ([id number])      propranolol (INDERAL) 10 MG tablet       venlafaxine 150 MG extended release tablet       Cranberry 200 MG CAPS Take by mouth      Milk Thistle 1000 MG CAPS Take by mouth      Multiple Vitamins-Minerals (THERAPEUTIC MULTIVITAMIN-MINERALS) tablet Take 1 tablet by mouth daily       No current facility-administered medications for this visit.         Allergies   Allergen Reactions    Celebrex [Celecoxib] Other (See Comments)     Patient states double vision    Toradol [Ketorolac Tromethamine]      Not sure of reaction    Morphine Rash       Family History   Problem Relation Age of Onset    Arthritis Mother     High Blood Pressure Mother     Arthritis Father     Diabetes Father     Dementia Father     Parkinsonism Father     High Blood Pressure Father     Cancer Paternal Uncle         colon       Social History     Socioeconomic History    Marital status:      Spouse name: Not on file    Number of children: Not on file    Years of education: Not on file    Highest education level: Not on file   Occupational History    Not on file   Social Needs    Financial resource strain: Not on file    Food insecurity     Worry: Not on file     Inability: Not on file    Transportation needs     Medical: Not on file     Non-medical: Not on file   Tobacco Use    Smoking status: Former Smoker     Packs/day: 1.00     Types: Cigarettes     Quit date: 2004     Years since quittin.1    Smokeless tobacco: Never Used   Substance and Sexual Activity    Alcohol use: Yes     Comment: rare    Drug use: No    Sexual activity: Not on file   Lifestyle    Physical activity     Days per week: Not on file     Minutes per session: Not on file    Stress: Not on file   Relationships    Social connections     Talks on phone: Not on file     Gets together: Not on file     Attends Mu-ism service: Not on file     Active member of club or organization: Not on file     Attends meetings of clubs or organizations: Not on file     Relationship status: Not on file    Intimate partner violence     Fear of current or ex partner: Not on file     Emotionally abused: Not on file     Physically abused: Not on file     Forced sexual activity: Not on file   Other Topics Concern    Not on file   Social History Narrative    Not on file     Vitals:    21 0855   BP: 110/76   Site: Left Lower Arm   Position: Sitting   Cuff Size: Medium Adult   Pulse: 76   Resp: 18   Temp: 97 °F (36.1 °C)   TempSrc: Temporal   SpO2: 98%   Weight: 186 lb (84.4 kg)     Body mass index is 36.33 kg/m². Wt Readings from Last 3 Encounters:   04/21/21 186 lb (84.4 kg)   04/20/21 186 lb 3.2 oz (84.5 kg)   03/04/21 171 lb (77.6 kg)     Physical Exam  Vitals signs reviewed. Constitutional:       General: She is not in acute distress. Appearance: She is well-developed. She is not diaphoretic. HENT:      Head: Normocephalic and atraumatic. Right Ear: External ear normal.      Left Ear: External ear normal.      Nose: Nose normal.   Eyes:      General: No scleral icterus. Right eye: No discharge. Left eye: No discharge. Conjunctiva/sclera: Conjunctivae normal.   Neck:      Musculoskeletal: Normal range of motion and neck supple. Cardiovascular:      Rate and Rhythm: Normal rate and regular rhythm. Heart sounds: Normal heart sounds. Pulmonary:      Effort: Pulmonary effort is normal. No respiratory distress. Breath sounds: Normal breath sounds. No wheezing or rales. Chest:      Chest wall: No tenderness. Abdominal:      General: Bowel sounds are normal. There is no distension. Palpations: Abdomen is soft. There is no mass. Tenderness: There is no abdominal tenderness. There is no guarding or rebound. Genitourinary:      Musculoskeletal: Normal range of motion. General: No tenderness. Skin:     General: Skin is warm and dry. Coloration: Skin is not pale. Findings: No erythema or rash. Neurological:      Mental Status: She is alert and oriented to person, place, and time. Cranial Nerves: No cranial nerve deficit. Psychiatric:         Behavior: Behavior normal.         Thought Content:  Thought content normal.         Judgment: Judgment normal.       Lab Results   Component Value Date     01/04/2021    K 4.1 01/04/2021    CL 98 01/04/2021    CO2 27 01/04/2021     Lab Results   Component Value Date CREATININE 0.5 01/04/2021     Lab Results   Component Value Date    WBC 7.3 01/04/2021    HGB 12.2 01/04/2021    HCT 39.0 01/04/2021    MCV 89.4 01/04/2021     01/04/2021     Imaging - none            Patient Active Problem List   Diagnosis    Hyperlipidemia    Obesity    Status post laparoscopic sleeve gastrectomy    Low back pain    Disorder of right sural nerve    Superficial peroneal nerve neuropathy    Abdominal pannus    Macromastia    Cellulitis    Tarsal tunnel syndrome, left     An electronic signature was used to authenticate this note.     --Jennifer Burnett MD

## 2021-04-24 LAB
SARS-COV-2: NOT DETECTED
SOURCE: NORMAL

## 2021-04-24 NOTE — H&P
Kael Pagan (: 1970)   ASSESSMENT:   1. Left medial buttock nonhealing wound/cyst possible rectal fistula   2. Crohn's disease   PLAN:   1. Schedule Claudia for anal exam under anesthesia with left medial buttock cyst excision; possible fistulotomy. 2. She will undergo pre-operative clearance per anesthesia guidelines with risk factors listed under the past medical history diagnosis & problem list.   3. The risks, benefits and alternatives were discussed with Cynthia Barker including non-operative management. All questions answered. She understands and wishes to proceed with surgical intervention. 4. Restrictions discussed with Cynthia Barker and she expresses understanding. 5. She is advised to call back directly if there are further questions/concerns, or if her symptoms worsen prior to surgery. 6. Stool softeners as needed   7. Sitz bath as needed   SUBJECTIVE/OBJECTIVE:        Chief Complaint   Patient presents with    Surgical Consult     Est patient-referred by Simone Dick. [place]-left buttock lesion,painful     HPI   Cynthia Barker is a 68-year-old female who presents for evaluation of a possible cyst on the left medial buttock that has been occurring now for the last several months. Intermittent drainage. Tender. About 1-2 in away from the anal opening. She acknowledges noticing this more after she had her sleeve gastrectomy and lost over 100 lbs. She admits at times the area is more swollen and tender along with inflammation. It does intermittently drain. Sometimes foul-smelling. Denies any new issues with bowel function. No significant constipation or diarrhea. No generalized abdominal pain. No distention. No nausea or vomiting. Tolerating diet. No significant heartburn/reflux. Recent right ankle surgery. No chest pain or shortness of breath. No fever, chills or sweats. Denies any trauma to the perianal region in the past. Admits to previous upper endoscopy and colonoscopy.  She states her colonoscopy was a few years ago but no real concerning findings. Admits to history of Crohn's disease. No history of perianal disease that she is aware of. Review of Systems   Constitutional: Negative for activity change, appetite change, chills, diaphoresis, fatigue, fever and unexpected weight change. HENT: Negative for congestion, dental problem, drooling, ear discharge, ear pain, facial swelling, hearing loss, mouth sores, nosebleeds, postnasal drip, rhinorrhea, sinus pressure, sneezing, sore throat, tinnitus, trouble swallowing and voice change. Eyes: Negative for photophobia, pain, discharge, redness, itching and visual disturbance. Respiratory: Negative for apnea, cough, choking, chest tightness, shortness of breath, wheezing and stridor. Cardiovascular: Negative for chest pain, palpitations and leg swelling. Gastrointestinal: Negative for abdominal distention, abdominal pain, anal bleeding, blood in stool, constipation, diarrhea, nausea, rectal pain and vomiting. Endocrine: Negative. Genitourinary: Negative for decreased urine volume, difficulty urinating, dyspareunia, dysuria, enuresis, flank pain, frequency, genital sores, hematuria, menstrual problem, pelvic pain, urgency, vaginal bleeding, vaginal discharge and vaginal pain. Musculoskeletal: Negative for arthralgias, back pain, gait problem, joint swelling, myalgias, neck pain and neck stiffness. Skin: Positive for wound. Negative for color change, pallor and rash. Allergic/Immunologic: Negative. Neurological: Positive for light-headedness and headaches. Negative for dizziness, tremors, seizures, syncope, facial asymmetry, speech difficulty, weakness and numbness. Hematological: Negative for adenopathy. Does not bruise/bleed easily. Psychiatric/Behavioral: Positive for dysphoric mood. Negative for agitation, behavioral problems, confusion, decreased concentration, hallucinations, self-injury, sleep disturbance and suicidal ideas. The patient is nervous/anxious. The patient is not hyperactive.      Past Medical History        Past Medical History:   Diagnosis Date    Arthritis     Hyperlipidemia      Past Surgical History         Past Surgical History:   Procedure Laterality Date    ANKLE SURGERY Left 2006    ANKLE SURGERY Right 03/2019    plate and screws    BREAST REDUCTION SURGERY Bilateral 11/9/2020    BILATERAL BREAST MAMMOPLASTY REDUCTION performed by Daniel Do MD at [place] [address], TOTAL ABDOMINAL      HYSTEROPLASTY      LIPECTOMY N/A 11/9/2020    PANN[facility]LECTOMY [facility] Do [facility] N/A 8/12/2019    ROBOTIC SLEEVE GASTRECTOMY performed by Karen Díaz MD at [address] [place] [address] N/A 2/18/2019    EGD WITH BX performed by Marycruz Fernández MD at [facility]    WRIST SURGERY Right 2014    plate and screws     Current Facility-Administered Medications          Current Outpatient Medications   Medication Sig Dispense Refill    Calcium Citrate-Vitamin D (CALCIUM CITRATE CHEWY BITE PO) Take 1 Dose by mouth daily      zinc gluconate 50 MG tablet Take 50 mg by mouth daily      acyclovir (ZOVIRAX) 800 MG tablet TAKE 1 TABLET BY MOUTH ONCE DAILY      Apple Cider Vinegar 300 MG TABS CIDER VINEGAR Apple Cider Vinegar 300 MG Oral Tablet 02/17/2021 Provider: 02- [facility] ([id number])      B Complex Vitamins (B COMPLEX-B12) TABS B Complex-B12 Oral Tablet B Complex-B12 Oral Tablet 02/17/2021 Provider: [facility]- [facility] ([id number])      propranolol (INDERAL) 10 MG tablet       venlafaxine 150 MG extended release tablet       Cranberry 200 MG CAPS Take by mouth      Milk Thistle 1000 MG CAPS Take by mouth      Multiple Vitamins-Minerals (THERAPEUTIC MULTIVITAMIN-MINERALS) tablet Take 1 tablet by mouth daily     No current facility-administered medications for this visit.              Allergies   Allergen Reactions    Celebrex [Celecoxib] Other (See Comments)     Patient states double vision    Toradol [Ketorolac Tromethamine]      Not sure of reaction    Morphine Rash     Family History         Family History   Problem Relation Age of Onset    Arthritis Mother     High Blood Pressure Mother     Arthritis Father     Diabetes Father     Dementia Father     Parkinsonism Father     High Blood Pressure Father     Cancer Paternal Uncle     colon     Social History         Socioeconomic History    Marital status:      Spouse name: Not on file    Number of children: Not on file    Years of education: Not on file    Highest education level: Not on file   Occupational History    Not on file   Social Needs    Financial resource strain: Not on file    Food insecurity     Worry: Not on file     Inability: Not on file    Transportation needs     Medical: Not on file     Non-medical: Not on file   Tobacco Use    Smoking status: Former Smoker     Packs/day: 1.00     Types: Cigarettes     Quit date: 2004     Years since quittin.1    Smokeless tobacco: Never Used   Substance and Sexual Activity    Alcohol use: Yes     Comment: rare    Drug use: No    Sexual activity: Not on file   Lifestyle    Physical activity     Days per week: Not on file     Minutes per session: Not on file    Stress: Not on file   Relationships    Social connections     Talks on phone: Not on file     Gets together: Not on file     Attends Mandaeism service: Not on file     Active member of club or organization: Not on file     Attends meetings of clubs or organizations: Not on file     Relationship status: Not on file    Intimate partner violence     Fear of current or ex partner: Not on file     Emotionally abused: Not on file     Physically abused: Not on file     Forced sexual activity: Not on file   Other Topics Concern    Not on file   Social History Narrative    Not on file     Vitals       Vitals:    04/21/21 0855   BP: 110/76   Site: Left Lower Arm   Position: Sitting   Cuff Size: Medium Adult   Pulse: 76   Resp: 18   Temp: 97 °F (36.1 °C)   TempSrc: Temporal   SpO2: 98%   Weight: 186 lb (84.4 kg)   Body mass index is 36.33 kg/m². Wt Readings from Last 3 Encounters:   04/21/21 186 lb (84.4 kg)   04/20/21 186 lb 3.2 oz (84.5 kg)   03/04/21 171 lb (77.6 kg)     Physical Exam   Vitals signs reviewed. Constitutional:   General: She is not in acute distress. Appearance: She is well-developed. She is not diaphoretic. HENT:   Head: Normocephalic and atraumatic. Right Ear: External ear normal.   Left Ear: External ear normal.   Nose: Nose normal.   Eyes:   General: No scleral icterus. Right eye: No discharge. Left eye: No discharge. Conjunctiva/sclera: Conjunctivae normal.   Neck:   Musculoskeletal: Normal range of motion and neck supple. Cardiovascular:   Rate and Rhythm: Normal rate and regular rhythm. Heart sounds: Normal heart sounds. Pulmonary:   Effort: Pulmonary effort is normal. No respiratory distress. Breath sounds: Normal breath sounds. No wheezing or rales. Chest:   Chest wall: No tenderness. Abdominal:   General: Bowel sounds are normal. There is no distension. Palpations: Abdomen is soft. There is no mass. Tenderness: There is no abdominal tenderness. There is no guarding or rebound. Genitourinary:       Musculoskeletal: Normal range of motion. General: No tenderness. Skin:   General: Skin is warm and dry. Coloration: Skin is not pale. Findings: No erythema or rash. Neurological:   Mental Status: She is alert and oriented to person, place, and time. Cranial Nerves: No cranial nerve deficit. Psychiatric:   Behavior: Behavior normal.   Thought Content:  Thought content normal.   Judgment: Judgment normal.           Lab Results   Component Value Date     01/04/2021    K 4.1 01/04/2021    CL 98 01/04/2021    CO2 27 01/04/2021           Lab Results   Component Value Date    CREATININE 0.5 01/04/2021           Lab Results   Component Value Date    WBC 7.3 01/04/2021    HGB 12.2 01/04/2021    HCT 39.0 01/04/2021    MCV 89.4 01/04/2021     01/04/2021     Imaging - none           Patient Active Problem List   Diagnosis    Hyperlipidemia    Obesity    Status post laparoscopic sleeve gastrectomy    Low back pain    Disorder of right sural nerve    Superficial peroneal nerve neuropathy    Abdominal pannus    Macromastia    Cellulitis    Tarsal tunnel syndrome, left     An electronic signature was used to authenticate this note.    --Palomo Vargas MD

## 2021-04-27 ENCOUNTER — APPOINTMENT (OUTPATIENT)
Dept: PHYSICAL THERAPY | Age: 51
End: 2021-04-27
Payer: MEDICAID

## 2021-04-28 ENCOUNTER — ANESTHESIA EVENT (OUTPATIENT)
Dept: OPERATING ROOM | Age: 51
End: 2021-04-28
Payer: MEDICAID

## 2021-04-28 ENCOUNTER — HOSPITAL ENCOUNTER (OUTPATIENT)
Dept: PHYSICAL THERAPY | Age: 51
Setting detail: THERAPIES SERIES
Discharge: HOME | End: 2021-04-28
Payer: MEDICAID

## 2021-04-28 PROCEDURE — 97035 APP MDLTY 1+ULTRASOUND EA 15: CPT

## 2021-04-28 PROCEDURE — 97140 MANUAL THERAPY 1/> REGIONS: CPT

## 2021-04-28 PROCEDURE — 97110 THERAPEUTIC EXERCISES: CPT

## 2021-04-28 NOTE — PROGRESS NOTES
Exercise/Intervention   Notes   Balance on foam:                  Feet side by side                 Step stance each way                  Tandem stance way    30 seconds  30 seconds  30 seconds              Needed light touches to bars   Ankle tband pf/df 10x orange     Seated wooden BAPS board- F/B, circles CW/CCW, side/side 10x      Seated Right heel/toe raises on foam 10x      Seated Rockerboard 2 directions bilat feet 10-15x each way      NuStep Level 3 (seat 6/arms 9)  6 minutes x Warm-up prior to Astym   Walking at rail in hallway Forward slowing down speed and concentrating on heel to toe patten and even step length 2 laps      Sidestepping at rail in hallway 2 laps      Standing heel/toe raises 15x  x    Discussed HEP and educated on what to continue with. Showed more standing activity can start on with marching, mini-squats and 3-way hip. Discussed IR with entire leg to help to get in more neutral position with gait                Specific Interventions Next Treatment: US with hydrocortisone cream, ASTYM, manual therapy, stretches and ROM, strengthening, gait and balance    Activity/Treatment Tolerance:  [x]  Patient tolerated treatment well  []  Patient limited by fatigue  []  Patient limited by pain   []  Patient limited by medical complications  []  Other:     Assessment: Pt continues to have restriction in right ankle and calf with Astym. Pt always feels a tingling sensation with ultrasound which she says makes it feel better. Pain is less at end of session. GOALS:  Patient Goal: To walk without any assistance    Short Term Goals:  Time Frame: 4 weeks   1) Patient to walk with smoother pattern and less guarding with foot and leg consistently pointing forward on right to do all shopping.   2)  Patient to demonstrate >10 degrees right ankle DF and 25 degrees of right PF for ease with gait pattern and going up and down stairs as needed  3)  Patient to demonstrate 4+-5/5 strength in right ankle to

## 2021-04-29 ENCOUNTER — APPOINTMENT (OUTPATIENT)
Dept: PHYSICAL THERAPY | Age: 51
End: 2021-04-29
Payer: MEDICAID

## 2021-04-29 ENCOUNTER — HOSPITAL ENCOUNTER (OUTPATIENT)
Dept: PHYSICAL THERAPY | Age: 51
Setting detail: THERAPIES SERIES
Discharge: HOME | End: 2021-04-29
Payer: MEDICAID

## 2021-04-29 ENCOUNTER — ANESTHESIA (OUTPATIENT)
Dept: OPERATING ROOM | Age: 51
End: 2021-04-29
Payer: MEDICAID

## 2021-04-29 ENCOUNTER — HOSPITAL ENCOUNTER (OUTPATIENT)
Age: 51
Setting detail: OUTPATIENT SURGERY
Discharge: HOME | End: 2021-04-29
Attending: SURGERY | Admitting: SURGERY
Payer: MEDICAID

## 2021-04-29 VITALS
SYSTOLIC BLOOD PRESSURE: 121 MMHG | OXYGEN SATURATION: 97 % | HEART RATE: 82 BPM | BODY MASS INDEX: 36.83 KG/M2 | HEIGHT: 60 IN | TEMPERATURE: 97.5 F | RESPIRATION RATE: 18 BRPM | DIASTOLIC BLOOD PRESSURE: 61 MMHG | WEIGHT: 187.6 LBS

## 2021-04-29 VITALS
SYSTOLIC BLOOD PRESSURE: 104 MMHG | OXYGEN SATURATION: 100 % | DIASTOLIC BLOOD PRESSURE: 64 MMHG | TEMPERATURE: 95.2 F | RESPIRATION RATE: 8 BRPM

## 2021-04-29 DIAGNOSIS — K60.3 ANAL FISTULA: Primary | ICD-10-CM

## 2021-04-29 PROCEDURE — 2500000003 HC RX 250 WO HCPCS: Performed by: NURSE ANESTHETIST, CERTIFIED REGISTERED

## 2021-04-29 PROCEDURE — 7100000001 HC PACU RECOVERY - ADDTL 15 MIN: Performed by: SURGERY

## 2021-04-29 PROCEDURE — 2500000003 HC RX 250 WO HCPCS: Performed by: SURGERY

## 2021-04-29 PROCEDURE — 2580000003 HC RX 258

## 2021-04-29 PROCEDURE — 3600000002 HC SURGERY LEVEL 2 BASE: Performed by: SURGERY

## 2021-04-29 PROCEDURE — 97110 THERAPEUTIC EXERCISES: CPT

## 2021-04-29 PROCEDURE — 46270 REMOVE ANAL FIST SUBQ: CPT | Performed by: SURGERY

## 2021-04-29 PROCEDURE — 3600000012 HC SURGERY LEVEL 2 ADDTL 15MIN: Performed by: SURGERY

## 2021-04-29 PROCEDURE — 97035 APP MDLTY 1+ULTRASOUND EA 15: CPT

## 2021-04-29 PROCEDURE — 7100000010 HC PHASE II RECOVERY - FIRST 15 MIN: Performed by: SURGERY

## 2021-04-29 PROCEDURE — 6360000002 HC RX W HCPCS: Performed by: NURSE ANESTHETIST, CERTIFIED REGISTERED

## 2021-04-29 PROCEDURE — 3700000001 HC ADD 15 MINUTES (ANESTHESIA): Performed by: SURGERY

## 2021-04-29 PROCEDURE — 7100000000 HC PACU RECOVERY - FIRST 15 MIN: Performed by: SURGERY

## 2021-04-29 PROCEDURE — 7100000011 HC PHASE II RECOVERY - ADDTL 15 MIN: Performed by: SURGERY

## 2021-04-29 PROCEDURE — 6360000002 HC RX W HCPCS: Performed by: ANESTHESIOLOGY

## 2021-04-29 PROCEDURE — 3700000000 HC ANESTHESIA ATTENDED CARE: Performed by: SURGERY

## 2021-04-29 PROCEDURE — 2709999900 HC NON-CHARGEABLE SUPPLY: Performed by: SURGERY

## 2021-04-29 PROCEDURE — 6360000002 HC RX W HCPCS: Performed by: SURGERY

## 2021-04-29 PROCEDURE — 6370000000 HC RX 637 (ALT 250 FOR IP)

## 2021-04-29 RX ORDER — ONDANSETRON 2 MG/ML
4 INJECTION INTRAMUSCULAR; INTRAVENOUS EVERY 6 HOURS PRN
Status: DISCONTINUED | OUTPATIENT
Start: 2021-04-29 | End: 2021-04-29 | Stop reason: HOSPADM

## 2021-04-29 RX ORDER — SODIUM CHLORIDE 0.9 % (FLUSH) 0.9 %
5-40 SYRINGE (ML) INJECTION EVERY 12 HOURS SCHEDULED
Status: DISCONTINUED | OUTPATIENT
Start: 2021-04-29 | End: 2021-04-29 | Stop reason: HOSPADM

## 2021-04-29 RX ORDER — PROMETHAZINE HYDROCHLORIDE 25 MG/ML
12.5 INJECTION, SOLUTION INTRAMUSCULAR; INTRAVENOUS
Status: DISCONTINUED | OUTPATIENT
Start: 2021-04-29 | End: 2021-04-29 | Stop reason: HOSPADM

## 2021-04-29 RX ORDER — HYDROCODONE BITARTRATE AND ACETAMINOPHEN 5; 325 MG/1; MG/1
TABLET ORAL
Status: COMPLETED
Start: 2021-04-29 | End: 2021-04-29

## 2021-04-29 RX ORDER — HYDROCODONE BITARTRATE AND ACETAMINOPHEN 5; 325 MG/1; MG/1
1-2 TABLET ORAL EVERY 6 HOURS PRN
Qty: 20 TABLET | Refills: 0 | Status: SHIPPED | OUTPATIENT
Start: 2021-04-29 | End: 2021-05-02

## 2021-04-29 RX ORDER — ONDANSETRON 2 MG/ML
INJECTION INTRAMUSCULAR; INTRAVENOUS PRN
Status: DISCONTINUED | OUTPATIENT
Start: 2021-04-29 | End: 2021-04-29 | Stop reason: SDUPTHER

## 2021-04-29 RX ORDER — ROCURONIUM BROMIDE 10 MG/ML
INJECTION, SOLUTION INTRAVENOUS PRN
Status: DISCONTINUED | OUTPATIENT
Start: 2021-04-29 | End: 2021-04-29 | Stop reason: SDUPTHER

## 2021-04-29 RX ORDER — PROMETHAZINE HYDROCHLORIDE 25 MG/1
12.5 TABLET ORAL EVERY 6 HOURS PRN
Status: DISCONTINUED | OUTPATIENT
Start: 2021-04-29 | End: 2021-04-29 | Stop reason: HOSPADM

## 2021-04-29 RX ORDER — LIDOCAINE HCL/PF 100 MG/5ML
SYRINGE (ML) INJECTION PRN
Status: DISCONTINUED | OUTPATIENT
Start: 2021-04-29 | End: 2021-04-29 | Stop reason: SDUPTHER

## 2021-04-29 RX ORDER — BUPIVACAINE HYDROCHLORIDE 5 MG/ML
INJECTION, SOLUTION EPIDURAL; INTRACAUDAL PRN
Status: DISCONTINUED | OUTPATIENT
Start: 2021-04-29 | End: 2021-04-29 | Stop reason: ALTCHOICE

## 2021-04-29 RX ORDER — FENTANYL CITRATE 50 UG/ML
INJECTION, SOLUTION INTRAMUSCULAR; INTRAVENOUS PRN
Status: DISCONTINUED | OUTPATIENT
Start: 2021-04-29 | End: 2021-04-29 | Stop reason: SDUPTHER

## 2021-04-29 RX ORDER — SODIUM CHLORIDE 9 MG/ML
25 INJECTION, SOLUTION INTRAVENOUS PRN
Status: DISCONTINUED | OUTPATIENT
Start: 2021-04-29 | End: 2021-04-29 | Stop reason: HOSPADM

## 2021-04-29 RX ORDER — MIDAZOLAM HYDROCHLORIDE 1 MG/ML
INJECTION INTRAMUSCULAR; INTRAVENOUS PRN
Status: DISCONTINUED | OUTPATIENT
Start: 2021-04-29 | End: 2021-04-29 | Stop reason: SDUPTHER

## 2021-04-29 RX ORDER — SODIUM CHLORIDE 9 MG/ML
INJECTION, SOLUTION INTRAVENOUS CONTINUOUS
Status: DISCONTINUED | OUTPATIENT
Start: 2021-04-29 | End: 2021-04-29 | Stop reason: HOSPADM

## 2021-04-29 RX ORDER — FENTANYL CITRATE 50 UG/ML
25 INJECTION, SOLUTION INTRAMUSCULAR; INTRAVENOUS EVERY 5 MIN PRN
Status: DISCONTINUED | OUTPATIENT
Start: 2021-04-29 | End: 2021-04-29 | Stop reason: HOSPADM

## 2021-04-29 RX ORDER — SODIUM CHLORIDE 0.9 % (FLUSH) 0.9 %
5-40 SYRINGE (ML) INJECTION PRN
Status: DISCONTINUED | OUTPATIENT
Start: 2021-04-29 | End: 2021-04-29 | Stop reason: HOSPADM

## 2021-04-29 RX ORDER — PROPOFOL 10 MG/ML
INJECTION, EMULSION INTRAVENOUS PRN
Status: DISCONTINUED | OUTPATIENT
Start: 2021-04-29 | End: 2021-04-29 | Stop reason: SDUPTHER

## 2021-04-29 RX ORDER — OXYCODONE HYDROCHLORIDE 5 MG/1
10 TABLET ORAL EVERY 4 HOURS PRN
Status: DISCONTINUED | OUTPATIENT
Start: 2021-04-29 | End: 2021-04-29 | Stop reason: HOSPADM

## 2021-04-29 RX ORDER — DEXAMETHASONE SODIUM PHOSPHATE 10 MG/ML
INJECTION, EMULSION INTRAMUSCULAR; INTRAVENOUS PRN
Status: DISCONTINUED | OUTPATIENT
Start: 2021-04-29 | End: 2021-04-29 | Stop reason: SDUPTHER

## 2021-04-29 RX ORDER — OXYCODONE HYDROCHLORIDE 5 MG/1
5 TABLET ORAL EVERY 4 HOURS PRN
Status: DISCONTINUED | OUTPATIENT
Start: 2021-04-29 | End: 2021-04-29 | Stop reason: HOSPADM

## 2021-04-29 RX ORDER — HYDROCODONE BITARTRATE AND ACETAMINOPHEN 5; 325 MG/1; MG/1
2 TABLET ORAL ONCE
Status: DISCONTINUED | OUTPATIENT
Start: 2021-04-29 | End: 2021-04-29 | Stop reason: HOSPADM

## 2021-04-29 RX ORDER — FENTANYL CITRATE 50 UG/ML
50 INJECTION, SOLUTION INTRAMUSCULAR; INTRAVENOUS EVERY 5 MIN PRN
Status: DISCONTINUED | OUTPATIENT
Start: 2021-04-29 | End: 2021-04-29 | Stop reason: HOSPADM

## 2021-04-29 RX ORDER — LABETALOL 20 MG/4 ML (5 MG/ML) INTRAVENOUS SYRINGE
10 EVERY 10 MIN PRN
Status: DISCONTINUED | OUTPATIENT
Start: 2021-04-29 | End: 2021-04-29 | Stop reason: HOSPADM

## 2021-04-29 RX ADMIN — FENTANYL CITRATE 25 MCG: 50 INJECTION, SOLUTION INTRAMUSCULAR; INTRAVENOUS at 14:48

## 2021-04-29 RX ADMIN — FENTANYL CITRATE 50 MCG: 50 INJECTION, SOLUTION INTRAMUSCULAR; INTRAVENOUS at 14:39

## 2021-04-29 RX ADMIN — SODIUM CHLORIDE: 9 INJECTION, SOLUTION INTRAVENOUS at 12:36

## 2021-04-29 RX ADMIN — HYDROCODONE BITARTRATE AND ACETAMINOPHEN 2 TABLET: 5; 325 TABLET ORAL at 14:58

## 2021-04-29 RX ADMIN — DEXAMETHASONE SODIUM PHOSPHATE 10 MG: 10 INJECTION, EMULSION INTRAMUSCULAR; INTRAVENOUS at 13:25

## 2021-04-29 RX ADMIN — FENTANYL CITRATE 100 MCG: 50 INJECTION, SOLUTION INTRAMUSCULAR; INTRAVENOUS at 13:15

## 2021-04-29 RX ADMIN — Medication 100 MG: at 13:15

## 2021-04-29 RX ADMIN — CEFAZOLIN 2000 MG: 10 INJECTION, POWDER, FOR SOLUTION INTRAVENOUS at 13:21

## 2021-04-29 RX ADMIN — MIDAZOLAM 2 MG: 1 INJECTION INTRAMUSCULAR; INTRAVENOUS at 13:11

## 2021-04-29 RX ADMIN — PROPOFOL 200 MG: 10 INJECTION, EMULSION INTRAVENOUS at 13:15

## 2021-04-29 RX ADMIN — SUGAMMADEX 200 MG: 100 INJECTION, SOLUTION INTRAVENOUS at 14:04

## 2021-04-29 RX ADMIN — ROCURONIUM BROMIDE 30 MG: 10 INJECTION INTRAVENOUS at 13:16

## 2021-04-29 RX ADMIN — ONDANSETRON HYDROCHLORIDE 4 MG: 4 INJECTION, SOLUTION INTRAMUSCULAR; INTRAVENOUS at 13:25

## 2021-04-29 RX ADMIN — FENTANYL CITRATE 25 MCG: 50 INJECTION, SOLUTION INTRAMUSCULAR; INTRAVENOUS at 14:33

## 2021-04-29 RX ADMIN — SUGAMMADEX 200 MG: 100 INJECTION, SOLUTION INTRAVENOUS at 13:55

## 2021-04-29 ASSESSMENT — PULMONARY FUNCTION TESTS
PIF_VALUE: 20
PIF_VALUE: 23
PIF_VALUE: 17
PIF_VALUE: 18
PIF_VALUE: 15
PIF_VALUE: 18
PIF_VALUE: 13
PIF_VALUE: 18
PIF_VALUE: 2
PIF_VALUE: 18
PIF_VALUE: 17
PIF_VALUE: 9
PIF_VALUE: 18
PIF_VALUE: 14
PIF_VALUE: 1
PIF_VALUE: 12
PIF_VALUE: 18
PIF_VALUE: 12
PIF_VALUE: 18
PIF_VALUE: 18
PIF_VALUE: 12
PIF_VALUE: 18
PIF_VALUE: 1
PIF_VALUE: 18

## 2021-04-29 ASSESSMENT — PAIN DESCRIPTION - PAIN TYPE
TYPE: SURGICAL PAIN

## 2021-04-29 ASSESSMENT — PAIN DESCRIPTION - DESCRIPTORS: DESCRIPTORS: ACHING

## 2021-04-29 ASSESSMENT — PAIN SCALES - GENERAL
PAINLEVEL_OUTOF10: 7
PAINLEVEL_OUTOF10: 7
PAINLEVEL_OUTOF10: 5
PAINLEVEL_OUTOF10: 2

## 2021-04-29 ASSESSMENT — PAIN DESCRIPTION - FREQUENCY: FREQUENCY: CONTINUOUS

## 2021-04-29 ASSESSMENT — PAIN DESCRIPTION - ORIENTATION: ORIENTATION: LEFT

## 2021-04-29 ASSESSMENT — PAIN DESCRIPTION - ONSET: ONSET: GRADUAL

## 2021-04-29 NOTE — INTERVAL H&P NOTE
Update History & Physical    The patient's History and Physical was reviewed with the patient and I examined the patient. There was no change. The surgical site was confirmed by the patient and me. Plan: The risks, benefits, expected outcome, and alternative to the recommended procedure have been discussed with the patient. Patient understands and wants to proceed with the procedure. The patient was counseled at length about the risks of liya Covid-19 during their perioperative period and any recovery window from their procedure. The patient was made aware that liya Covid-19  may worsen their prognosis for recovering from their procedure  and lend to a higher morbidity and/or mortality risk. All material risks, benefits, and reasonable alternatives including postponing the procedure were discussed. The patient does wish to proceed with the procedure at this time.     Electronically signed by Eric Peters MD on 4/29/2021 at 12:39 PM

## 2021-04-29 NOTE — ANESTHESIA PRE PROCEDURE
Camper,  mL/hr at  1236 New Bag at 21 1236       Allergies:     Allergies   Allergen Reactions    Celebrex [Celecoxib] Other (See Comments)     Patient states double vision    Toradol [Ketorolac Tromethamine]      Not sure of reaction    Morphine Rash       Problem List:    Patient Active Problem List   Diagnosis Code    Hyperlipidemia E78.5    Obesity E66.9    Status post laparoscopic sleeve gastrectomy Z98.84    Low back pain M54.5    Disorder of right sural nerve G57.81    Superficial peroneal nerve neuropathy G57.30    Abdominal pannus E65    Macromastia N62    Cellulitis L03.90    Tarsal tunnel syndrome, left G57.52       Past Medical History:        Diagnosis Date    Arthritis     Hyperlipidemia        Past Surgical History:        Procedure Laterality Date    ANKLE SURGERY Left     ANKLE SURGERY Right 2019    plate and screws    BREAST REDUCTION SURGERY Bilateral 2020    BILATERAL BREAST MAMMOPLASTY REDUCTION performed by Triston Peralta MD at [place] [address], TOTAL ABDOMINAL      HYSTEROPLASTY      LIPECTOMY N/A 2020    PANNICULECTOMY performed by Triston Peralta MD at [place] [address] 2019    ROBOTIC SLEEVE GASTRECTOMY performed by Stephania Rivas MD at [address] [place] [address] N/A 2019    EGD WITH BX performed by Perla Mora MD at [facility]    WRIST SURGERY Right     plate and screws       Social History:    Social History     Tobacco Use    Smoking status: Former Smoker     Packs/day: 1.00     Types: Cigarettes     Quit date: 2004     Years since quittin.2    Smokeless tobacco: Never Used   Substance Use Topics    Alcohol use: Yes     Comment: rare                                Counseling given: Not Answered      Vital Signs (Current):   Vitals:    21 1153   BP: 119/80   Pulse: 55   Resp: 18   TempSrc: Temporal   SpO2: 97%   Weight: 187 lb 9.6 oz (85.1 kg)   Height: 5' (1.524 m)                                              BP Readings from Last 3 Encounters:   04/29/21 119/80   04/21/21 110/76   04/20/21 131/84       NPO Status: Time of last liquid consumption: 2352                        Time of last solid consumption: 2352                        Date of last liquid consumption: 04/28/21                        Date of last solid food consumption: 04/28/21    BMI:   Wt Readings from Last 3 Encounters:   04/29/21 187 lb 9.6 oz (85.1 kg)   04/21/21 186 lb (84.4 kg)   04/20/21 186 lb 3.2 oz (84.5 kg)     Body mass index is 36.64 kg/m². CBC:   Lab Results   Component Value Date    WBC 7.3 01/04/2021    RBC 4.36 01/04/2021    HGB 13.3 04/22/2021    HCT 42.7 04/22/2021    MCV 89.4 01/04/2021    RDW 14.9 11/29/2020     01/04/2021       CMP:   Lab Results   Component Value Date     04/22/2021    K 4.8 04/22/2021    K 3.7 11/30/2020     04/22/2021    CO2 29 04/22/2021    BUN 15 04/22/2021    CREATININE 0.5 04/22/2021    AGRATIO 1.5 08/28/2020    LABGLOM >90 04/22/2021    GLUCOSE 87 04/22/2021    GLUCOSE 88 08/28/2020    PROT 5.4 11/30/2020    CALCIUM 9.7 04/22/2021    BILITOT 0.5 11/30/2020    ALKPHOS 78 11/30/2020    AST 9 11/30/2020    ALT 6 11/30/2020       POC Tests: No results for input(s): POCGLU, POCNA, POCK, POCCL, POCBUN, POCHEMO, POCHCT in the last 72 hours.     Coags:   Lab Results   Component Value Date    INR 0.93 11/19/2018       HCG (If Applicable): No results found for: PREGTESTUR, PREGSERUM, HCG, HCGQUANT     ABGs: No results found for: PHART, PO2ART, WXQ8QWZ, HSK2WSN, BEART, X5NNQDXA     Type & Screen (If Applicable):  Lab Results   Component Value Date    LABRH POS 11/11/2020       Drug/Infectious Status (If Applicable):  No results found for: HIV, HEPCAB    COVID-19 Screening (If Applicable):   Lab Results   Component Value Date    COVID19 Not Detected 04/22/2021 Anesthesia Evaluation  Patient summary reviewed  Airway: Mallampati: III  TM distance: >3 FB   Neck ROM: full  Mouth opening: > = 3 FB Dental:          Pulmonary:                              Cardiovascular:                      Neuro/Psych:   (+) neuromuscular disease:,             GI/Hepatic/Renal:             Endo/Other:                     Abdominal:           Vascular:                                        Anesthesia Plan      general     ASA 2       Induction: intravenous. MIPS: Postoperative opioids intended and Prophylactic antiemetics administered. Anesthetic plan and risks discussed with patient. Plan discussed with CRNA. Saintclair Charnley.  David Velazquez,    4/29/2021

## 2021-04-29 NOTE — ANESTHESIA POSTPROCEDURE EVALUATION
Department of Anesthesiology  Postprocedure Note    Patient: Liana Schmitt  MRN: [id number]  YOB: 1970  Date of evaluation: 4/29/2021  Time:  4:16 PM     Procedure Summary     Date: 04/29/21 Room / Location: [address] BHAV[facility] Sol[facility]    Anesthesia Start: 1309 Anesthesia Stop: 1762    Procedure: ANAL EXAM UNDER ANESTHESIA, LEFT MEDIAL BUTTOCK CYST EXCISION, AND ANAL FISTULOTOMY (N/A ) Diagnosis: (LEFT MEDIAL BUTTOCK CYST)    Surgeons: Emili Mac MD Responsible Provider: Julia Ayers DO    Anesthesia Type: general ASA Status: 2          Anesthesia Type: general    Albert Phase I: Albert Score: 9    Albert Phase II: Albert Score: 10    Last vitals: Reviewed and per EMR flowsheets. Anesthesia Post Evaluation    Comments: Morgan Krishnan 60  POST-ANESTHESIA NOTE       Name:  Liana Schmitt                                         Age:  54 y.o.   MRN:  [id number]      Last Vitals:  /64   Pulse 70   Temp 97.5 °F (36.4 °C) (Temporal)   Resp 18   Ht 5' (1.524 m)   Wt 187 lb 9.6 oz (85.1 kg)   SpO2 98%   BMI 36.64 kg/m²   Patient Vitals in the past 4 hrs:  04/29/21 1545, BP:121/64, Pulse:70, Resp:18  04/29/21 1525, BP:129/69, Pulse:68, Resp:16  04/29/21 1505, BP:(!) 146/79, Pulse:60, Resp:14, SpO2:98 %  04/29/21 1500, BP:119/88, Pulse:58, Resp:16, SpO2:98 %  04/29/21 1455, BP:120/89, Pulse:62, Resp:16, SpO2:98 %  04/29/21 1450, Pulse:66, Resp:16, SpO2:97 %  04/29/21 1445, BP:(!) 122/93, Pulse:64, Resp:16, SpO2:97 %  04/29/21 1440, BP:119/80, Pulse:63, SpO2:100 %  04/29/21 1435, BP:137/83, Pulse:61, SpO2:97 %  04/29/21 1430, BP:120/84, Pulse:63, SpO2:100 %  04/29/21 1425, BP:119/83, Pulse:68, SpO2:100 %  04/29/21 1420, BP:138/86, Pulse:64, SpO2:100 %  04/29/21 1415, BP:(!) 144/96, Pulse:69, Resp:16, SpO2:100 %  04/29/21 1410, BP:138/87, Pulse:65, Resp:16, SpO2:100 %  04/29/21 1409, BP:138/87, Temp:97.5 °F (36.4 °C), Temp src:Temporal, Pulse:60, Resp:16, SpO2:100 %    Level of Consciousness:  Awake    Respiratory:  Stable    Oxygen Saturation:  Stable    Cardiovascular:  Stable    Hydration:  Adequate    PONV:  Stable    Post-op Pain:  Adequate analgesia    Post-op Assessment:  No apparent anesthetic complications    Additional Follow-Up / Treatment / Comment:  None    Marce Rainey DO  April 29, 2021   4:16 PM

## 2021-04-29 NOTE — PROGRESS NOTES
[facility]  PHYSICAL THERAPY  [] EVALUATION  [x] DAILY NOTE (LAND) [] DAILY NOTE (AQUATIC ) [] PROGRESS NOTE [] DISCHARGE NOTE    [x] OUTPATIENT REHABILITATION CENTER [facility]   [] [place] [address]    [] [facility]   [] [facility]    Date: 2021  Patient Name:  Arvind Christensen  : 1970  MRN: [id number]  CSN: [id number]    Referring Practitioner ELIDA Mays*   Diagnosis RIGHT ANKLE     Treatment Diagnosis Right foot pain, Difficulty with ambulation, Balance deficits, Right ankle weakness   Date of Evaluation 3/9/21    Additional Pertinent History Surgery on right foot 2-3 years ago       Functional Outcome Measure Used FAAM   Functional Outcome Score 21. Rated 30% of full function  (3/9/21), PN 46 and Rated 40% of full function (4-15-21)      Insurance: Primary: Payor: Ginger Alarcon /  /  / ,   Secondary:    Authorization Information: No precert needed until after 30th visit. Aquatics and Modalities except Ionto and HP/CP covered. Telehealth covered. Visit # 15, 3/6 for progress note   Visits Allowed: 30   Recertification Date: 76   Physician Follow-Up: 5-3-21   Physician Orders: FWB on right   History of Present Illness: 2021 had tarsal tunnel surgery. Has a permanent plate/screw in right ankle from a prior surgery. SUBJECTIVE:  Patient reports right ankle feels pretty good today, just a little sore/achy from rainy weather. Pt scheduled to have boil removed off bottom.      TREATMENT   Precautions: None   Pain: 2/10 right ankle    X in shaded column indicates activity completed today   Modalities Parameters/  Location  Notes   Ultrasound to right lateral achilles tendon 3.3 MHz, 0.8 W/cm2, 50% pulsed for 8 minutes x                Manual Therapy Time/Technique  Notes   Astym right lower leg 15 minutes  Soft tissue restriction in achilles, plantar fascia, arch, ankle mortise, lateral and medial ankle, gastroc Exercise/Intervention   Notes   Balance on foam:                  Feet side by side                 Step stance each way                  Tandem stance way    1 min  30 seconds  30 seconds   x  x  x         Needed light touches to bars   Ankle tband pf/df 10x orange     Seated wooden BAPS board- F/B, circles CW/CCW, side/side 10x  x    Seated Right heel/toe raises on foam 15x  x    Seated Rockerboard 2 directions bilat feet 10-15x each way      NuStep Level 3 (seat 6/arms 9)  6 minutes x Warm-up prior to Astym   Walking at rail in hallway Forward slowing down speed and concentrating on heel to toe patten and even step length 2 laps      Sidestepping at rail in // bars 3 laps  x    Standing heel/toe raises 15x  x    March, mini-squats, 3 way hip (bilat) 10  x             Specific Interventions Next Treatment: US with hydrocortisone cream, ASTYM, manual therapy, stretches and ROM, strengthening, gait and balance    Activity/Treatment Tolerance:  [x]  Patient tolerated treatment well  []  Patient limited by fatigue  []  Patient limited by pain   []  Patient limited by medical complications  []  Other:     Assessment: Held Astym d/t being seen 2 consecutive days. Introduced standing marches, 3 way hip, and squats with soreness noted with squats. Ended with ultrasound to lateral ankle with less pain at end of session. GOALS:  Patient Goal: To walk without any assistance    Short Term Goals:  Time Frame: 4 weeks   1) Patient to walk with smoother pattern and less guarding with foot and leg consistently pointing forward on right to do all shopping.   2)  Patient to demonstrate >10 degrees right ankle DF and 25 degrees of right PF for ease with gait pattern and going up and down stairs as needed  3)  Patient to demonstrate 4+-5/5 strength in right ankle to return to doing work around the home  4) Patient to perform all balance activity with no more than one hand assist for safety with all community ambulation  5) Patient to be able to SLS on right for 2-3 seconds for improved stance phase during gait          Long Term Goals:  Time Frame: 12 weeks  1) Patient to be independent with home program to perform all daily activity with minimal to no difficulty or pain      Patient Education:   []  HEP/Education Completed: Continue to work on HEP and increase activity as able. Keep working on keeping leg and foot in neutral with gait. Continued POC.  [employer] Access Code:  [x]  No new Education completed  []  Reviewed Prior HEP      []  Patient verbalized and/or demonstrated understanding of education provided. []  Patient unable to verbalize and/or demonstrate understanding of education provided. Will continue education. []  Barriers to learning: None    PLAN:  []  Plan of care initiated. Plan to see patient 2 times per week for 3 weeks to address the treatment planned outlined above.   [x]  Continue with current plan of care  []  Modify plan of care as follows:    []  Hold pending physician visit  []  Discharge    Time In 1045   Time Out 1130   Timed Code Minutes: 45 min   Total Treatment Time: 45 min       Electronically Signed by: Thanh Ellis

## 2021-04-29 NOTE — BRIEF OP NOTE
Brief Postoperative Note      Patient: Lauren Barbosa  YOB: 1970  MRN: [id number]    Date of Procedure: 4/29/2021    Pre-Op Diagnosis: LEFT MEDIAL BUTTOCK CYST/ANAL FISTULA    Post-Op Diagnosis: Same       Procedure:  1.  Carli Santos with Fistulotomy    Surgeon(s):  Thresa Runner, MD    Assistant:  First Assistant: Alcira Nagel RN    Anesthesia: General/Local    Estimated Blood Loss (mL): 5 ml    Complications: None    Specimens:   * No specimens in log *    Implants:  * No implants in log *      Drains:   [REMOVED] Closed/Suction Drain Left;Proximal;Anterior Abdomen Bulb (Removed)       Findings: as above - see op note for details    Electronically signed by Thresa Runner, MD on 4/29/2021 at 1:51 PM

## 2021-04-29 NOTE — PROGRESS NOTES
ADMITTED TO [facility] AND ORIENTED TO UNIT. SCDS ON. FALL AND ALLERGY BANDS ON.  PT VERBALIZED APPROVAL FOR FIRST NAME, LAST INITIAL AND PHYSICIAN NAME ON UNIT WHITEBOARD.  tHE PATIENT WILL CALL SOMEONE TO PICK HER UP AND TAKE HER HOME

## 2021-04-30 NOTE — OP NOTE
[address] [place] [id number]                                OPERATIVE REPORT    PATIENT NAME: Kathi Cain                     :        1970  MED REC NO:   [id number]                           ROOM:  ACCOUNT NO:   [de-identified]                           ADMIT DATE: 2021  PROVIDER:     BHARGAVI Sutherland Cleaves:  2021    PREOPERATIVE DIAGNOSIS:  Left medial buttock cyst, possible anal  fistula. POSTOPERATIVE DIAGNOSIS:  Left medial anal fistula. OPERATIONS PERFORMED:  1. Anal exam under anesthesia. 2.  Left medial buttock anal fistulotomy. SURGEON:  Osei Wade MD    ASSISTANT:  Harris Ugarte. RORY Sanders    ANESTHESIA:  General/local.    ESTIMATED BLOOD LOSS:  Less than 5 mL. DRAINS:  None. COMPLICATIONS:  None. DISPOSITION:  Stable to the recovery room. INDICATIONS:  The patient is a 43-year-old female who I had seen in the  office secondary to a left medial buttock cyst that periodically was  draining. She wished to have this removed. Concern for possible anal  fistula. Both operative and nonoperative intervention plans were  discussed. Risks of surgery were further discussed. Some of the risks  included but were not limited to bleeding, infection, the need for  reoperation, severe chronic postoperative pain or numbness, major  vascular or nerve injury, cardiopulmonary complications, anesthetic  complications, seroma/hematoma formation, wound breakdown, recurrence of  the fistula, chronic anal pain, incontinence, and death. After all of  the questions were answered in their entirety and the patient was  completely aware of the current situation, she elected to proceed with  the procedure. DESCRIPTION OF PROCEDURE:  After informed consent was signed and placed  on the chart, the patient was taken back to the operating room and  placed supine on the operating room table. General anesthesia was  induced. She tolerated this well throughout the case. She was then  positioned into the prone jackknife position. Pressure points were  padded. Her buttock was taped apart with benzoin and tape. The lower  back and anal region were all prepped and draped in the usual sterile  standard fashion. Timeout occurred prior to the operation which not  only identified the patient, but also the planned procedure to be  performed. At the end of the timeout, there were no questions or  concerns. I began the operation first by gently anally dilating the patient up to  three-finger dilation. Anal retractors were placed. Probe was then  used to the small medial open, cystic-like wound there on the left  medial side and the probe actually penetrated easily to the inner  lateral sidewall on the left side of the rectum. This was a very  superficial anal fistula. Because of that, I elected to cut down on the  probe to open up the fistulous track with electrocautery. The small  fistulous track was then actually excised in its entirety. Electrocautery was used to assure hemostasis. Did not appear to  penetrate through the sphincter muscles. Due to the superficial nature  and the good, healthy-looking nature of the tissue, I elected to  primarily reapproximate this with Vicryl suture in the deeper layer and  then also run this then with a 4-0 Vicryl in a subcuticular fashion. This closed up nicely. The patient tolerated the local anesthetic well. No other significant findings other than some mild internal hemorrhoids,  but nothing that required excision at this time. Dry sterile dressings  applied. Sponge, needle, and instrumentation count was correct at the  end of the procedure. The patient tolerated the procedure well with no  apparent complications and less than 5 mL of blood loss.   Easily brought  out of general anesthesia and then transferred to the postanesthesia  care unit in stable condition.         Ghazal Gregory M.D.    D: 04/29/2021 14:00:33       T: 04/29/2021 20:11:19     [email or web address]  Job#: [id number]     Doc#: [id number]    CC:

## 2021-05-04 ENCOUNTER — HOSPITAL ENCOUNTER (OUTPATIENT)
Dept: PHYSICAL THERAPY | Age: 51
Setting detail: THERAPIES SERIES
Discharge: HOME | End: 2021-05-04
Payer: MEDICAID

## 2021-05-04 PROCEDURE — 97035 APP MDLTY 1+ULTRASOUND EA 15: CPT

## 2021-05-04 PROCEDURE — 97140 MANUAL THERAPY 1/> REGIONS: CPT

## 2021-05-04 NOTE — PROGRESS NOTES
[address] [facility]  PHYSICAL THERAPY  [] EVALUATION  [x] DAILY NOTE (LAND) [] DAILY NOTE (AQUATIC ) [] PROGRESS NOTE [] DISCHARGE NOTE    [x] OUTPATIENT REHABILITATION CENTER [facility]   [] [address]    [] [facility]   [] [facility]ie [facility]    Date: 2021  Patient Name:  Cale Culp  : 1970  MRN: [id number]  CSN: [id number]    Referring Practitioner ELIDA Daily*   Diagnosis RIGHT ANKLE     Treatment Diagnosis Right foot pain, Difficulty with ambulation, Balance deficits, Right ankle weakness   Date of Evaluation 3/9/21    Additional Pertinent History Surgery on right foot 2-3 years ago       Functional Outcome Measure Used FAAM   Functional Outcome Score 21. Rated 30% of full function  (3/9/21), PN 46 and Rated 40% of full function (4-15-21)      Insurance: Primary: Payor: Doug Doyle /  /  / ,   Secondary:    Authorization Information: No precert needed until after 30th visit. Aquatics and Modalities except Ionto and HP/CP covered. Telehealth covered. Visit # 14,  for progress note   Visits Allowed: 30   Recertification Date:    Physician Follow-Up: 21   Physician Orders: FWB on right   History of Present Illness: 2021 had tarsal tunnel surgery. Has a permanent plate/screw in right ankle from a prior surgery. SUBJECTIVE:  Patient reports right ankle irritation from rainy weather. Pt drove to [facility] for doctor's appointment yesterday causing soreness in gluteus where boil was removed. Pt reports she didn't get much direction from the doctor and scheduled follow up in 3 weeks. Doctor provided foam arch support in shoe but patient notes helps. Pt requested to hold on bike today d/t sore bottom.      TREATMENT   Precautions: None   Pain: 2/10 right ankle    X in shaded column indicates activity completed today   Modalities Parameters/  Location  Notes   Ultrasound to right lateral achilles tendon 3.3 MHz, 0.8 W/cm2, 50% pulsed for 8 minutes x                Manual Therapy Time/Technique  Notes   Astym right lower leg 18 minutes x Soft tissue restriction in achilles, plantar fascia, arch, ankle mortise, lateral and medial ankle, gastroc               Exercise/Intervention   Notes   Balance on foam:                  Feet side by side                 Step stance each way                  Tandem stance way    1 min  30 seconds  30 seconds                Needed light touches to bars   Ankle tband pf/df 10x orange     Seated wooden BAPS board- F/B, circles CW/CCW, side/side 10x      Seated Right heel/toe raises on foam 15x      Seated Rockerboard 2 directions bilat feet 10-15x each way      NuStep Level 3 (seat 6/arms 9)  6 minutes  Warm-up prior to Astym   Walking at rail in hallway Forward slowing down speed and concentrating on heel to toe patten and even step length 2 laps      Sidestepping at rail in // bars 3 laps      Standing heel/toe raises 15x      March, mini-squats, 3 way hip (bilat) 10               Specific Interventions Next Treatment: US with hydrocortisone cream, ASTYM, manual therapy, stretches and ROM, strengthening, gait and balance    Activity/Treatment Tolerance:  [x]  Patient tolerated treatment well  []  Patient limited by fatigue  []  Patient limited by pain   []  Patient limited by medical complications  []  Other:     Assessment: Pt requested to hold on bike and exercises d/t bottom being sore from surgery. Astym and ultrasound completed with good tolerance. GOALS:  Patient Goal: To walk without any assistance    Short Term Goals:  Time Frame: 4 weeks   1) Patient to walk with smoother pattern and less guarding with foot and leg consistently pointing forward on right to do all shopping.   2)  Patient to demonstrate >10 degrees right ankle DF and 25 degrees of right PF for ease with gait pattern and going up and down stairs as needed  3)  Patient to demonstrate 4+-5/5 strength in right ankle to return to doing work around the home  4) Patient to perform all balance activity with no more than one hand assist for safety with all community ambulation  5) Patient to be able to SLS on right for 2-3 seconds for improved stance phase during gait          Long Term Goals:  Time Frame: 12 weeks  1) Patient to be independent with home program to perform all daily activity with minimal to no difficulty or pain      Patient Education:   []  HEP/Education Completed: Continue to work on HEP and increase activity as able. Keep working on keeping leg and foot in neutral with gait. Continued POC.  [employer] Access Code:  [x]  No new Education completed  []  Reviewed Prior HEP      []  Patient verbalized and/or demonstrated understanding of education provided. []  Patient unable to verbalize and/or demonstrate understanding of education provided. Will continue education. []  Barriers to learning: None    PLAN:  []  Plan of care initiated. Plan to see patient 2 times per week for 3 weeks to address the treatment planned outlined above.   [x]  Continue with current plan of care  []  Modify plan of care as follows:    []  Hold pending physician visit  []  Discharge    Time In 1302   Time Out 1336   Timed Code Minutes: 34 min   Total Treatment Time: 34 min       Electronically Signed by: Vin Badillo

## 2021-05-06 ENCOUNTER — HOSPITAL ENCOUNTER (OUTPATIENT)
Dept: PHYSICAL THERAPY | Age: 51
Setting detail: THERAPIES SERIES
Discharge: HOME | End: 2021-05-06
Payer: MEDICAID

## 2021-05-06 PROCEDURE — 97110 THERAPEUTIC EXERCISES: CPT

## 2021-05-06 NOTE — DISCHARGE SUMMARY
walking on uneven surfaces. She has shown gains in ankle DF but is still limited with other directions, possibly due to multiple surgeries on foot. She is walking with more normal pattern but is still slightly guarded and will continue to improve with time and does more walking and builds more strength and balance. Patient has been given a full HEP to continue with and is able to do so. Patient has limited therapy sessions per year and will need to save them for therapy needed after has surgery on left foot. No further therapy needed at this time. GOALS:  Patient Goal: To walk without any assistance    Short Term Goals:  Time Frame: 4 weeks   1) Patient to walk with smoother pattern and less guarding with foot and leg consistently pointing forward on right to do all shopping. [x] Goal Met [] Goal Not Met [] Continue Goal [x] Discontinue Goal  [] Revise Goal  Goal Assessment: Patient has met goal, just still slightly guarded with walking and depends on the surface and time of day.   2)  Patient to demonstrate >10 degrees right ankle DF and 25 degrees of right PF for ease with gait pattern and going up and down stairs as needed  [] Goal Met [x] Goal Not Met [] Continue Goal [x] Discontinue Goal  [] Revise Goal  Goal Assessment: Patient with 12 degrees DF and 20 degrees PF  3)  Patient to demonstrate 4+-5/5 strength in right ankle to return to doing work around the home  [x] Goal Met [] Goal Not Met [] Continue Goal [x] Discontinue Goal  [] Revise Goal  4) Patient to perform all balance activity with no more than one hand assist for safety with all community ambulation  [x] Goal Met [] Goal Not Met [] Continue Goal [x] Discontinue Goal  [] Revise Goal  Goal Assessment:  Patient still needing one assist for tandem stance  5) Patient to be able to SLS on right for 2-3 seconds for improved stance phase during gait    [] Goal Met [x] Goal Not Met [] Continue Goal [x] Discontinue Goal  [] Revise Goal  Goal Assessment: Patient can do with moderate assist through hands        Long Term Goals:  Time Frame: 12 weeks  1) Patient to be independent with home program to perform all daily activity with minimal to no difficulty or pain  [] Goal Met [x] Goal Not Met [] Continue Goal [x] Discontinue Goal  [] Revise Goal      Patient Education:   [x]  HEP/Education Completed: Continue to work on HEP and increase activity as able. Keep working on keeping leg and foot in neutral with gait.  [employer] Access Code:  []  No new Education completed  []  Reviewed Prior HEP      [x]  Patient verbalized and/or demonstrated understanding of education provided. []  Patient unable to verbalize and/or demonstrate understanding of education provided. Will continue education. []  Barriers to learning: None    PLAN:  []  Plan of care initiated. Plan to see patient 2 times per week for 3 weeks to address the treatment planned outlined above.   []  Continue with current plan of care  []  Modify plan of care as follows:    []  Hold pending physician visit  [x]  Discharge    Time In 0920   Time Out 1008   Timed Code Minutes: 48 min   Total Treatment Time: 48 min       Electronically Signed by: [place] [id number]

## 2021-05-11 NOTE — PROGRESS NOTES
[address] [facility] [address]  Dept: [phone]  Dept Fax: [phone]  Loc: [phone]    Visit Date: 5/12/2021    Stephen Aldridge is a 54 y.o. female who presents today for:  Chief Complaint   Patient presents with    Post-Op Check     s/p Anal exam under anesthesia, Left medial buttock anal fistulotomy-4/29/2021       HPI:     HPI    Urmila Powell is a 54-year old female who presents today for follow-up status post AEUA and left medial buttock anal fistulotomy 2 weeks ago with Dr. Yesy Frazier. Patient is doing well. Off narcotics. Still has some incisional soreness. No increased pain with bowel movements. Denies any rectal bleeding. Still has tan drainage daily but it has decreased in amount. Tolerating regular diet without any nausea or vomiting. Denies any issues with constipation or diarrhea. Incision is well approximated without any signs of infection. No fevers or chills. Urinating without difficulties. Denies any shortness of breath or chest pain. Tolerating increased activity well.      Past Medical History:   Diagnosis Date    Arthritis     Hyperlipidemia       Past Surgical History:   Procedure Laterality Date    ANKLE SURGERY Left 2006    ANKLE SURGERY Right 03/2019    plate and screws    ANUS SURGERY N/A 4/29/2021    ANAL EXAM UNDER ANESTHESIA, LEFT MEDIAL BUTTOCK CYST EXCISION, AND ANAL FISTULOTOMY performed by Arnaud Gooden MD at [facility] 25 Bilateral 11/9/2020    BILATERAL BREAST MAMMOPLASTY REDUCTION performed by Cordell Steiner MD at [facility], TOTAL ABDOMINAL      HYSTEROPLASTY      LIPECTOMY N/A 11/9/2020    PANNICULECTOMY performed by Cordell Steiner MD at [place] [address] 8/12/2019    ROBOTIC SLEEVE GASTRECTOMY performed by Arnaud Gooden MD at [facility] change. Negative for appetite change, chills, diaphoresis, fatigue, fever and unexpected weight change. HENT: Negative for congestion, dental problem, hearing loss, rhinorrhea, sinus pressure and sore throat. Eyes: Negative for photophobia, pain, discharge, itching and visual disturbance. Respiratory: Negative for apnea, cough, choking, chest tightness, shortness of breath and wheezing. Cardiovascular: Negative for chest pain, palpitations and leg swelling. Gastrointestinal: Positive for rectal pain. Negative for abdominal distention, abdominal pain, anal bleeding, blood in stool, constipation, diarrhea, nausea and vomiting. Small amount of rectal drainage - no bleeding     Endocrine: Negative. Genitourinary: Negative for decreased urine volume, difficulty urinating, dysuria, frequency and urgency. Musculoskeletal: Negative for arthralgias, back pain, gait problem, joint swelling, myalgias and neck pain. Skin: Negative for color change, pallor, rash and wound. Allergic/Immunologic: Negative. Neurological: Negative for dizziness, tremors, weakness, numbness and headaches. Hematological: Negative. Psychiatric/Behavioral: Negative. Objective:   /72 (Site: Right Lower Arm, Position: Sitting, Cuff Size: Medium Adult)   Pulse 74   Temp 97.5 °F (36.4 °C) (Temporal)   Resp 18   Wt 192 lb (87.1 kg)   SpO2 98%   BMI 37.50 kg/m²     Physical Exam  Vitals reviewed. Constitutional:       General: She is not in acute distress. Appearance: Normal appearance. She is well-developed. She is not ill-appearing or toxic-appearing. HENT:      Head: Normocephalic and atraumatic. Right Ear: Hearing and external ear normal.      Left Ear: Hearing and external ear normal.      Nose: Nose normal.      Mouth/Throat:      Mouth: Mucous membranes are not pale, not dry and not cyanotic.    Eyes:      General: Lids are normal.   Neck:      Trachea: Trachea and phonation normal. Cardiovascular:      Rate and Rhythm: Normal rate and regular rhythm. Pulses: Normal pulses. Heart sounds: S1 normal and S2 normal.   Pulmonary:      Effort: Pulmonary effort is normal. No tachypnea, bradypnea, accessory muscle usage or respiratory distress. Breath sounds: Normal breath sounds. No decreased breath sounds, wheezing or rales. Chest:      Chest wall: No tenderness. Abdominal:      General: Bowel sounds are normal. There is no distension. Palpations: Abdomen is soft. There is no mass. Tenderness: There is no abdominal tenderness. Genitourinary:      Musculoskeletal:         General: No tenderness. Normal range of motion. Cervical back: Normal range of motion and neck supple. Skin:     General: Skin is warm and dry. Findings: No abrasion, bruising, burn, ecchymosis, erythema, laceration, lesion or rash. Neurological:      Mental Status: She is alert and oriented to person, place, and time. Motor: No tremor, atrophy or abnormal muscle tone. Coordination: Coordination normal.      Gait: Gait normal.      Deep Tendon Reflexes: Reflexes are normal and symmetric. Psychiatric:         Speech: Speech normal.         Behavior: Behavior normal.         Thought Content: Thought content normal.          Patient Active Problem List   Diagnosis    Hyperlipidemia    Obesity    Status post laparoscopic sleeve gastrectomy    Low back pain    Disorder of right sural nerve    Superficial peroneal nerve neuropathy    Abdominal pannus    Macromastia    Cellulitis    Tarsal tunnel syndrome, left    Anal fistula     Assessment:     1. Status post AEUA and left medial buttock anal fistulotomy  2. Obesity (BMI 37)    Plan:     1. Incision healing well without signs of infection. Continue wound care as directed. 2. Avoid straining or using creams to rectal area to avoid breakdown secondary to moisture. Sitz baths as needed. 3. Bowel function doing well.  Stool softeners as needed. 4. Off narcotics. Tylenol as needed for discomfort. 5. Lifting/activity restrictions discussed with patient. Questions answered. 6. Follow up in 2 weeks. Signs and symptoms reviewed with patient that would be concerning and need her to return to office for re-evaluation. Patient states she will call if she has questions or concerns.       Electronically signed by ANJEL Wang CNP on 5/17/2021 at 7:38 AM

## 2021-05-12 ENCOUNTER — OFFICE VISIT (OUTPATIENT)
Dept: SURGERY | Age: 51
End: 2021-05-12

## 2021-05-12 VITALS
HEART RATE: 74 BPM | WEIGHT: 192 LBS | DIASTOLIC BLOOD PRESSURE: 72 MMHG | BODY MASS INDEX: 37.5 KG/M2 | SYSTOLIC BLOOD PRESSURE: 120 MMHG | TEMPERATURE: 97.5 F | OXYGEN SATURATION: 98 % | RESPIRATION RATE: 18 BRPM

## 2021-05-12 DIAGNOSIS — K60.3 ANAL FISTULA: Primary | ICD-10-CM

## 2021-05-12 PROCEDURE — 99024 POSTOP FOLLOW-UP VISIT: CPT | Performed by: NURSE PRACTITIONER

## 2021-05-13 ENCOUNTER — TELEPHONE (OUTPATIENT)
Dept: SURGERY | Age: 51
End: 2021-05-13

## 2021-05-13 NOTE — TELEPHONE ENCOUNTER
Pt was seen in the office yesterday, calling this morning stating when she had a bowel movement she noticed a 4 inch long string had come out. No bleeding or additional drainage.  She is wondering if this is all normal?

## 2021-05-13 NOTE — TELEPHONE ENCOUNTER
Likely the stitch. If she is not having any increased bleeding or drainage just continue with guaze in that area as we discussed and if she has more bleeding then she would need to come in so we could look at the area again.

## 2021-05-13 NOTE — TELEPHONE ENCOUNTER
Called pt and relayed message, she verbalized understanding. Will call the office back if any issues.

## 2021-05-17 ASSESSMENT — ENCOUNTER SYMPTOMS
BACK PAIN: 0
RECTAL PAIN: 1
ANAL BLEEDING: 0
BLOOD IN STOOL: 0
RHINORRHEA: 0
CHEST TIGHTNESS: 0
EYE PAIN: 0
ABDOMINAL DISTENTION: 0
CHOKING: 0
COLOR CHANGE: 0
APNEA: 0
ALLERGIC/IMMUNOLOGIC NEGATIVE: 1
ABDOMINAL PAIN: 0
WHEEZING: 0
PHOTOPHOBIA: 0
CONSTIPATION: 0
DIARRHEA: 0
EYE DISCHARGE: 0
COUGH: 0
EYE ITCHING: 0
SORE THROAT: 0
NAUSEA: 0
SHORTNESS OF BREATH: 0
SINUS PRESSURE: 0
VOMITING: 0

## 2021-05-21 ENCOUNTER — OFFICE VISIT (OUTPATIENT)
Dept: SURGERY | Age: 51
End: 2021-05-21

## 2021-05-21 VITALS
BODY MASS INDEX: 37.89 KG/M2 | DIASTOLIC BLOOD PRESSURE: 62 MMHG | HEART RATE: 78 BPM | RESPIRATION RATE: 18 BRPM | HEIGHT: 60 IN | TEMPERATURE: 97.8 F | WEIGHT: 193 LBS | SYSTOLIC BLOOD PRESSURE: 120 MMHG | OXYGEN SATURATION: 97 %

## 2021-05-21 DIAGNOSIS — K60.3 ANAL FISTULA: Primary | ICD-10-CM

## 2021-05-21 PROCEDURE — 99024 POSTOP FOLLOW-UP VISIT: CPT | Performed by: NURSE PRACTITIONER

## 2021-05-25 NOTE — PROGRESS NOTES
Patient here for a quick wound check before leaving for vacation. Patient wanting to know if she can get in the water. Incision assessed and skin has separation superficially a little. Absorbable sutures somewhat exposed and clipped. No signs of infection but do not recommend patient getting in pool or hot tub. Drainage is still tan. No bloody drainage. Area minimally tender. Having bowel function normally and no pain. Continue wound care as discussed with leida and change as often as needed. Follow up in 10 days when she returns from vacation.

## 2021-06-01 ENCOUNTER — HOSPITAL ENCOUNTER (OUTPATIENT)
Age: 51
Discharge: HOME | End: 2021-06-01
Payer: MEDICAID

## 2021-06-01 DIAGNOSIS — Z98.84 STATUS POST LAPAROSCOPIC SLEEVE GASTRECTOMY: ICD-10-CM

## 2021-06-01 DIAGNOSIS — Z13.21 SCREENING FOR MALNUTRITION: ICD-10-CM

## 2021-06-01 DIAGNOSIS — K91.2 POSTSURGICAL MALABSORPTION: ICD-10-CM

## 2021-06-01 LAB
ALBUMIN SERPL-MCNC: 4.3 G/DL (ref 3.5–5.1)
ALP BLD-CCNC: 96 U/L (ref 38–126)
ALT SERPL-CCNC: 12 U/L (ref 11–66)
ANION GAP SERPL CALCULATED.3IONS-SCNC: 13 MEQ/L (ref 8–16)
AST SERPL-CCNC: 16 U/L (ref 5–40)
BASOPHILS # BLD: 1.1 %
BASOPHILS ABSOLUTE: 0.1 THOU/MM3 (ref 0–0.1)
BILIRUB SERPL-MCNC: 0.4 MG/DL (ref 0.3–1.2)
BUN BLDV-MCNC: 16 MG/DL (ref 7–22)
CALCIUM SERPL-MCNC: 9.6 MG/DL (ref 8.5–10.5)
CHLORIDE BLD-SCNC: 102 MEQ/L (ref 98–111)
CHOLESTEROL, TOTAL: 293 MG/DL (ref 100–199)
CO2: 27 MEQ/L (ref 23–33)
CREAT SERPL-MCNC: 0.5 MG/DL (ref 0.4–1.2)
EOSINOPHIL # BLD: 3.2 %
EOSINOPHILS ABSOLUTE: 0.2 THOU/MM3 (ref 0–0.4)
ERYTHROCYTE [DISTWIDTH] IN BLOOD BY AUTOMATED COUNT: 13.8 % (ref 11.5–14.5)
ERYTHROCYTE [DISTWIDTH] IN BLOOD BY AUTOMATED COUNT: 48.2 FL (ref 35–45)
FERRITIN: 71 NG/ML (ref 10–291)
FOLATE: 16.4 NG/ML (ref 4.8–24.2)
GFR SERPL CREATININE-BSD FRML MDRD: > 90 ML/MIN/1.73M2
GLUCOSE BLD-MCNC: 93 MG/DL (ref 70–108)
HCT VFR BLD CALC: 40 % (ref 37–47)
HDLC SERPL-MCNC: 57 MG/DL
HEMOGLOBIN: 12.5 GM/DL (ref 12–16)
IMMATURE GRANS (ABS): 0.01 THOU/MM3 (ref 0–0.07)
IMMATURE GRANULOCYTES: 0.2 %
IRON: 77 UG/DL (ref 50–170)
LDL CHOLESTEROL CALCULATED: 182 MG/DL
LYMPHOCYTES # BLD: 45.9 %
LYMPHOCYTES ABSOLUTE: 2.6 THOU/MM3 (ref 1–4.8)
MCH RBC QN AUTO: 29.7 PG (ref 26–33)
MCHC RBC AUTO-ENTMCNC: 31.3 GM/DL (ref 32.2–35.5)
MCV RBC AUTO: 95 FL (ref 81–99)
MONOCYTES # BLD: 9.5 %
MONOCYTES ABSOLUTE: 0.5 THOU/MM3 (ref 0.4–1.3)
NUCLEATED RED BLOOD CELLS: 0 /100 WBC
PLATELET # BLD: 325 THOU/MM3 (ref 130–400)
PMV BLD AUTO: 9.8 FL (ref 9.4–12.4)
POTASSIUM SERPL-SCNC: 4.1 MEQ/L (ref 3.5–5.2)
PREALBUMIN: 26.2 MG/DL (ref 20–40)
PTH INTACT: 38.9 PG/ML (ref 15–65)
RBC # BLD: 4.21 MILL/MM3 (ref 4.2–5.4)
SEG NEUTROPHILS: 40.1 %
SEGMENTED NEUTROPHILS ABSOLUTE COUNT: 2.3 THOU/MM3 (ref 1.8–7.7)
SODIUM BLD-SCNC: 142 MEQ/L (ref 135–145)
TOTAL IRON BINDING CAPACITY: 337 UG/DL (ref 171–450)
TOTAL PROTEIN: 7.2 G/DL (ref 6.1–8)
TRIGL SERPL-MCNC: 269 MG/DL (ref 0–199)
TSH SERPL DL<=0.05 MIU/L-ACNC: 3.54 UIU/ML (ref 0.4–4.2)
VITAMIN B-12: 1277 PG/ML (ref 211–911)
VITAMIN D 25-HYDROXY: 37 NG/ML (ref 30–100)
WBC # BLD: 5.7 THOU/MM3 (ref 4.8–10.8)

## 2021-06-01 PROCEDURE — 83550 IRON BINDING TEST: CPT

## 2021-06-01 PROCEDURE — 84134 ASSAY OF PREALBUMIN: CPT

## 2021-06-01 PROCEDURE — 84443 ASSAY THYROID STIM HORMONE: CPT

## 2021-06-01 PROCEDURE — 83540 ASSAY OF IRON: CPT

## 2021-06-01 PROCEDURE — 85025 COMPLETE CBC W/AUTO DIFF WBC: CPT

## 2021-06-01 PROCEDURE — 80061 LIPID PANEL: CPT

## 2021-06-01 PROCEDURE — 80053 COMPREHEN METABOLIC PANEL: CPT

## 2021-06-01 PROCEDURE — 84425 ASSAY OF VITAMIN B-1: CPT

## 2021-06-01 PROCEDURE — 83970 ASSAY OF PARATHORMONE: CPT

## 2021-06-01 PROCEDURE — 36415 COLL VENOUS BLD VENIPUNCTURE: CPT

## 2021-06-01 PROCEDURE — 82746 ASSAY OF FOLIC ACID SERUM: CPT

## 2021-06-01 PROCEDURE — 82306 VITAMIN D 25 HYDROXY: CPT

## 2021-06-01 PROCEDURE — 82607 VITAMIN B-12: CPT

## 2021-06-01 PROCEDURE — 83036 HEMOGLOBIN GLYCOSYLATED A1C: CPT

## 2021-06-01 PROCEDURE — 82728 ASSAY OF FERRITIN: CPT

## 2021-06-02 ENCOUNTER — OFFICE VISIT (OUTPATIENT)
Dept: SURGERY | Age: 51
End: 2021-06-02

## 2021-06-02 ENCOUNTER — OFFICE VISIT (OUTPATIENT)
Dept: BARIATRICS/WEIGHT MGMT | Age: 51
End: 2021-06-02
Payer: MEDICAID

## 2021-06-02 VITALS
WEIGHT: 193 LBS | DIASTOLIC BLOOD PRESSURE: 86 MMHG | HEART RATE: 66 BPM | BODY MASS INDEX: 37.89 KG/M2 | RESPIRATION RATE: 18 BRPM | TEMPERATURE: 97.7 F | HEIGHT: 60 IN | SYSTOLIC BLOOD PRESSURE: 124 MMHG

## 2021-06-02 VITALS
SYSTOLIC BLOOD PRESSURE: 120 MMHG | TEMPERATURE: 97.3 F | RESPIRATION RATE: 18 BRPM | DIASTOLIC BLOOD PRESSURE: 60 MMHG | OXYGEN SATURATION: 95 % | BODY MASS INDEX: 38.17 KG/M2 | HEIGHT: 60 IN | HEART RATE: 73 BPM | WEIGHT: 194.4 LBS

## 2021-06-02 DIAGNOSIS — Z98.84 STATUS POST LAPAROSCOPIC SLEEVE GASTRECTOMY: Primary | ICD-10-CM

## 2021-06-02 DIAGNOSIS — F41.9 ANXIETY: ICD-10-CM

## 2021-06-02 DIAGNOSIS — F32.A DEPRESSION, UNSPECIFIED DEPRESSION TYPE: ICD-10-CM

## 2021-06-02 DIAGNOSIS — K60.3 ANAL FISTULA: Primary | ICD-10-CM

## 2021-06-02 LAB
AVERAGE GLUCOSE: 90 MG/DL (ref 70–126)
HBA1C MFR BLD: 5 % (ref 4.4–6.4)

## 2021-06-02 PROCEDURE — G8427 DOCREV CUR MEDS BY ELIG CLIN: HCPCS | Performed by: PHYSICIAN ASSISTANT

## 2021-06-02 PROCEDURE — G8417 CALC BMI ABV UP PARAM F/U: HCPCS | Performed by: PHYSICIAN ASSISTANT

## 2021-06-02 PROCEDURE — 1036F TOBACCO NON-USER: CPT | Performed by: PHYSICIAN ASSISTANT

## 2021-06-02 PROCEDURE — 99024 POSTOP FOLLOW-UP VISIT: CPT | Performed by: NURSE PRACTITIONER

## 2021-06-02 PROCEDURE — 99213 OFFICE O/P EST LOW 20 MIN: CPT | Performed by: PHYSICIAN ASSISTANT

## 2021-06-02 RX ORDER — NALTREXONE HYDROCHLORIDE AND BUPROPION HYDROCHLORIDE 8; 90 MG/1; MG/1
2 TABLET, EXTENDED RELEASE ORAL 2 TIMES DAILY
Qty: 120 TABLET | Refills: 0 | Status: SHIPPED | OUTPATIENT
Start: 2021-06-02 | End: 2021-07-15 | Stop reason: SDUPTHER

## 2021-06-02 NOTE — PROGRESS NOTES
[facility]  [address]  [facility], [place]      Visit Date:  6/2/2021  Weight Management Postop Follow-up    HPI:      Arvind Christensen is a 54 y.o. female who is here today for weight management follow up . She is also almost 2 years post-op  robotic-assisted sleeve gastrectomy performed by Dr. Donna King  on 8/12/19. Rough year with multiple surgeries and loss of her father. Has been struggling. Continues to [facility] with [facility]. Has been working through issues with emotional eating. Nutrition has been off. Not making the right choices. Eating later in the evening. Eating more frequently. Craving frequently. Started eating fried foods. Admits to being emotional/stressed and eating through her emotions. Has gone back to old habits. Weight today 193#. Up 22# since last visit. Continues to be down 74# since surgery. BMI 37 . Drinking plenty of water. Getting in plenty of protein. Drinking 1 protein shakes daily. No carbonation. Eating sweets. Tolerating post-op diet. No problems with bowel movements. No nausea. No emesis. No GERD/ Reflux. Denies CP/SOB. No Dizziness. No abdominal pain. Taking and tolerating all vitamins- CelebrateONE 45/ Calcium/ B12. Labs reviewed. B12 elevated- advised to decreased B12 to every other day. Cholesterol and triglycerides elevated- advised to follow up with PCP- has apt scheduled. Long discussion on importance of lifestyle changes, making better decisions with nutrition and increasing dedicated activity to be successful lifelong with weight loss with or without bariatric surgery. Currently under restrictions from recent anal fistula repair. Will follow up in 2 weeks and hoping to be released from restrictions. Has been walking 5-6 times per week for 20 minutes. Discussed adding arm weights. Discussed adding weight loss medication. Would like to start Contrave.  Risk/benefits and side effects discussed. Information given to patient on medication. Will start with samples. Titration scheduled given to patient. Will also schedule follow up with the dietitian to get back on track. Will given journal today to get back to tracking. Current BMI: Body mass index is 37.69 kg/m².   Current Weight:   Wt Readings from Last 3 Encounters:   06/02/21 193 lb (87.5 kg)   06/02/21 194 lb 6.4 oz (88.2 kg)   05/21/21 193 lb (87.5 kg)     Pre-op Body Weight:267  Lowest weight post-op: 161      Past Medical History:  Past Medical History:   Diagnosis Date    Arthritis     Hyperlipidemia        Past Surgical History:  Past Surgical History:   Procedure Laterality Date    ANKLE SURGERY Left 2006    ANKLE SURGERY Right 03/2019    plate and screws    ANUS SURGERY N/A 4/29/2021    ANAL EXAM UNDER ANESTHESIA, LEFT MEDIAL BUTTOCK CYST EXCISION, AND ANAL FISTULOTOMY performed by Eric Peters MD at [facility] 25 Bilateral 11/9/2020    BILATERAL BREAST MAMMOPLASTY REDUCTION performed by Silvia Mariscal MD at [address] [facility] COLONOSCOPY      FRACTURE SURGERY      HYSTERECTOMY, TOTAL ABDOMINAL      HYSTEROPLASTY      LIPECTOMY N/A 11/9/2020    PANNICULECTOMY performed by Silvia Mariscal MD at [facility] N/A 8/12/2019    ROBOTIC SLEEVE GASTRECTOMY performed by Eric Peters MD at [facility] 26 N/A 2/18/2019    EGD WITH BX performed by Foster Husain MD at [facility]    WRIST SURGERY Right 2014    plate and screws       Past Social History:  Social History     Socioeconomic History    Marital status:      Spouse name: Not on file    Number of children: Not on file    Years of education: Not on file    Highest education level: Not on file   Occupational History    Not on file   Tobacco Use    Smoking status: Former Smoker     Packs/day: 1.00     Types: Cigarettes     Quit date: 2/18/2004     Years since quittin.2    Smokeless tobacco: Never Used   Vaping Use    Vaping Use: Never used   Substance and Sexual Activity    Alcohol use: Yes     Comment: rare    Drug use: No    Sexual activity: Not on file   Other Topics Concern    Not on file   Social History Narrative    Not on file     Social Determinants of Health     Financial Resource Strain:     Difficulty of Paying Living Expenses:    Food Insecurity:     Worried About Running Out of Food in the Last Year:     Ran Out of Food in the Last Year:    Transportation Needs:     Lack of Transportation (Medical):      Lack of Transportation (Non-Medical):    Physical Activity:     Days of Exercise per Week:     Minutes of Exercise per Session:    Stress:     Feeling of Stress :    Social Connections:     Frequency of Communication with Friends and Family:     Frequency of Social Gatherings with Friends and Family:     Attends Taoist Services:     Active Member of Clubs or Organizations:     Attends Club or Organization Meetings:     Marital Status:    Intimate Partner Violence:     Fear of Current or Ex-Partner:     Emotionally Abused:     Physically Abused:     Sexually Abused:         Medications:   Current Outpatient Medications   Medication Sig Dispense Refill    Biotin w/ Vitamins C & E (HAIR/SKIN/NAILS PO) Take by mouth      naltrexone-buPROPion (CONTRAVE) 8-90 MG per extended release tablet Take 2 tablets by mouth 2 times daily 120 tablet 0    Calcium Citrate-Vitamin D (CALCIUM CITRATE CHEWY BITE PO) Take 1 Dose by mouth daily      zinc gluconate 50 MG tablet Take 50 mg by mouth daily      acyclovir (ZOVIRAX) 800 MG tablet TAKE 1 TABLET BY MOUTH ONCE DAILY      Apple Cider Vinegar 300 MG TABS CIDER VINEGAR Apple Cider Vinegar 300 MG Oral Tablet 2021 Provider: 2021  [facility] 87 ([id number])      B Complex Vitamins (B COMPLEX-B12) TABS B Complex-B12 Oral Tablet B Complex-B12 Oral Tablet 95.0 06/01/2021    MCH 29.7 06/01/2021    MCHC 31.3 06/01/2021    RDW 14.9 11/29/2020     06/01/2021    MPV 9.8 06/01/2021    SEGSPCT 40.1 06/01/2021    LABLYMP 45.9 06/01/2021    MONOPCT 9.5 06/01/2021    MONOPCT 6.1 08/28/2020    LABEOS 3.2 06/01/2021    BASO 1.1 06/01/2021    NRBC 0 06/01/2021    SEGSABS 2.3 06/01/2021    LYMPHSABS 2.6 06/01/2021    MONOSABS 0.5 06/01/2021    EOSABS 0.2 06/01/2021    BASOSABS 0.1 06/01/2021        BMP/CMP   Lab Results   Component Value Date    GLUCOSE 93 06/01/2021    GLUCOSE 88 08/28/2020    CREATININE 0.5 06/01/2021    BUN 16 06/01/2021     06/01/2021    K 4.1 06/01/2021    K 3.7 11/30/2020     06/01/2021    CO2 27 06/01/2021    CALCIUM 9.6 06/01/2021    AST 16 06/01/2021    ALKPHOS 96 06/01/2021    PROT 7.2 06/01/2021    LABALBU 4.3 06/01/2021    BILITOT 0.4 06/01/2021    ALT 12 06/01/2021        PREALBUMIN   Lab Results   Component Value Date    PREALBUMIN 26.2 06/01/2021        VITAMIN B12   Lab Results   Component Value Date    JNITKVNL02 5882 06/01/2021        24 HOUR URINE CALCIUM   No results found for: Josh Deluna, CALCIUMUR     VITAMIN D   Lab Results   Component Value Date    VITD25 37 06/01/2021        VITAMIN B1/ THIAMINE   Lab Results   Component Value Date    UVGQ7CCNBFK 120 11/19/2018        RBC FOLATE   Lab Results   Component Value Date    FOLATE 16.4 06/01/2021        LIPID SCREEN (FASTING)   Lab Results   Component Value Date    CHOL 293 06/01/2021    TRIG 269 06/01/2021    HDL 57 06/01/2021    LDLCALC 182 06/01/2021   ,     HGA1C (T2DM ONLY)   Lab Results   Component Value Date    LABA1C 5.0 06/01/2021    AVGG 90 06/01/2021        TSH   Lab Results   Component Value Date    TSH 3.540 06/01/2021        IRON   Lab Results   Component Value Date    IRON 77 06/01/2021        IONIZED CALCIUM   No results found for: MACKENZIE DALLAS      Assessment:       Diagnosis Orders   1. Status post laparoscopic sleeve gastrectomy     2.  BMI 37.0-37.9, adult     3. Depression, unspecified depression type     4. Anxiety       Plan:    Stay well hydrated. Drink a minimum of 64 oz of non-carbonated, non-caffeinated fluids daily. Nutritional education occurred during visit. Tolerating diet. Continue following with dietitian and follow their recommendations as directed. Continue  60-80 grams of protein each day. Signs and symptoms reviewed with patient that would be concerning and need her to return to office for re-evaluation. Patient will call if any questions or concerns arrise. Importance of physical activity discussed with patient. Increase physical activity as tolerated  Add strength training, as able  Continue taking Multivitamin, Calcium and B12 ( every other day)  Encouraged to attend support groups  2 year labs reviewed with patient today- B1 pending  Continue following with [facility], as scheduled  Journal given- start tracking all food intake  Apt made with dietitian in 3 weeks  Contrave samples given today- titration schedule discussed and given to patient  Information given on Contrave today  Follow up with PCP for elevated cholesterol/triglycerides  Will follow monthly while taking weight loss medication. Follow up with Nella in 2-3 weeks. Return in about 6 weeks (around 7/14/2021) for Follow up. I spent over 20 minutes with the patient, with greater that 50% of that time spent on face counseling for nutrition and exercise.     Electronically signed by TAINA Acuna on 6/2/2021 at 12:06 PM

## 2021-06-03 ASSESSMENT — ENCOUNTER SYMPTOMS
ABDOMINAL PAIN: 0
EYE DISCHARGE: 0
COLOR CHANGE: 0
CHOKING: 0
BACK PAIN: 0
ALLERGIC/IMMUNOLOGIC NEGATIVE: 1
DIARRHEA: 0
WHEEZING: 0
BLOOD IN STOOL: 0
SHORTNESS OF BREATH: 0
ANAL BLEEDING: 0
VOMITING: 0
EYE PAIN: 0
APNEA: 0
PHOTOPHOBIA: 0
CONSTIPATION: 0
NAUSEA: 0
RECTAL PAIN: 0
SINUS PRESSURE: 0
CHEST TIGHTNESS: 0
RHINORRHEA: 0
ABDOMINAL DISTENTION: 0
SORE THROAT: 0
EYE ITCHING: 0
COUGH: 0

## 2021-06-03 NOTE — PROGRESS NOTES
[address] [address]  Dept: [phone]  Dept Fax: [phone]  Loc: [phone]    Visit Date: 6/2/2021    Stephen Aldridge is a 54 y.o. female who presents today for:  Chief Complaint   Patient presents with    Post-Op Check     s/p Anal exam under anesthesia, Left medial buttock anal fistulotomy-4/29/2021 Last seen in the office 5/21/2021       HPI:     HPI    Urmila Powell is a 54-year old female who presents today for follow-up status post Jimmy Lazar and left medial buttock anal fistulotomy on 4/29/21 with Dr. Yesy Frazier. Just returned back from vacation and here for wound check. Area looks better and continues to slowly heal. Skin is no longer  and absorbable sutures are trying to dissolve. Patient still denies any rectal pain or discomfort. Denies any rectal bleeding. Still has small amount of drainage intermittently throughout the day. Tolerating regular diet without any nausea or vomiting. Denies any issues with constipation or diarrhea. Incision is well approximated without any signs of infection. No fevers or chills. Urinating without difficulties. Denies any shortness of breath or chest pain. Tolerating increased activity well. Would like to go back to normal exercising as soon as possible. She follows down in weight management.     Past Medical History:   Diagnosis Date    Arthritis     Hyperlipidemia       Past Surgical History:   Procedure Laterality Date    ANKLE SURGERY Left 2006    ANKLE SURGERY Right 03/2019    plate and screws    ANUS SURGERY N/A 4/29/2021    ANAL EXAM UNDER ANESTHESIA, LEFT MEDIAL BUTTOCK CYST EXCISION, AND ANAL FISTULOTOMY performed by Arnaud Gooden MD at [facility] 25 Bilateral 11/9/2020    BILATERAL BREAST MAMMOPLASTY REDUCTION performed by Cordell Steiner MD at [place] [address]  HYSTERECTOMY, TOTAL ABDOMINAL      HYSTEROPLASTY      LIPECTOMY N/A 2020    PANNICULECTOMY performed by Lulu Rios MD at [facility] N/A 2019    ROBOTIC SLEEVE GASTRECTOMY performed by Melissa Desir MD at [address] N/A 2019    EGD WITH BX performed by Sussy Dorsey MD at [facility] Right 2014    plate and screws       Family History   Problem Relation Age of Onset    Arthritis Mother     High Blood Pressure Mother     Arthritis Father     Diabetes Father     Dementia Father     Parkinsonism Father     High Blood Pressure Father     Cancer Paternal Uncle         colon       Social History     Tobacco Use    Smoking status: Former Smoker     Packs/day: 1.00     Types: Cigarettes     Quit date: 2004     Years since quittin.3    Smokeless tobacco: Never Used   Substance Use Topics    Alcohol use: Yes     Comment: rare      Current Outpatient Medications   Medication Sig Dispense Refill    Calcium Citrate-Vitamin D (CALCIUM CITRATE CHEWY BITE PO) Take 1 Dose by mouth daily      zinc gluconate 50 MG tablet Take 50 mg by mouth daily      acyclovir (ZOVIRAX) 800 MG tablet TAKE 1 TABLET BY MOUTH ONCE DAILY      Apple Cider Vinegar 300 MG TABS CIDER VINEGAR Apple Cider Vinegar 300 MG Oral Tablet 2021 Provider: 2021  [facility] ([id number])      B Complex Vitamins (B COMPLEX-B12) TABS B Complex-B12 Oral Tablet B Complex-B12 Oral Tablet 2021 Provider: 2021  [facility] 87 ([id number])      propranolol (INDERAL) 10 MG tablet       venlafaxine 150 MG extended release tablet       Cranberry 200 MG CAPS Take by mouth      Milk Thistle 1000 MG CAPS Take by mouth      Multiple Vitamins-Minerals (THERAPEUTIC MULTIVITAMIN-MINERALS) tablet Take 1 tablet by mouth daily CELEBRATE ONE 45      Biotin w/ Vitamins C & E (HAIR/SKIN/NAILS PO) Take by mouth  naltrexone-buPROPion (CONTRAVE) 8-90 MG per extended release tablet Take 2 tablets by mouth 2 times daily 120 tablet 0     No current facility-administered medications for this visit. Allergies   Allergen Reactions    Celebrex [Celecoxib] Other (See Comments)     Patient states double vision    Toradol [Ketorolac Tromethamine]      Not sure of reaction    Morphine Rash       Subjective:     Review of Systems   Constitutional: Negative for activity change, appetite change, chills, diaphoresis, fatigue, fever and unexpected weight change. HENT: Negative for congestion, dental problem, hearing loss, rhinorrhea, sinus pressure and sore throat. Eyes: Negative for photophobia, pain, discharge, itching and visual disturbance. Respiratory: Negative for apnea, cough, choking, chest tightness, shortness of breath and wheezing. Cardiovascular: Negative for chest pain, palpitations and leg swelling. Gastrointestinal: Negative for abdominal distention, abdominal pain, anal bleeding, blood in stool, constipation, diarrhea, nausea, rectal pain and vomiting. Small amount of rectal drainage - no bleeding     Endocrine: Negative. Genitourinary: Negative for decreased urine volume, difficulty urinating, dysuria, frequency and urgency. Musculoskeletal: Negative for arthralgias, back pain, gait problem, joint swelling, myalgias and neck pain. Skin: Negative for color change, pallor, rash and wound. Allergic/Immunologic: Negative. Neurological: Negative for dizziness, tremors, weakness, numbness and headaches. Hematological: Negative. Psychiatric/Behavioral: Negative. Objective:   /60 (Site: Left Upper Arm, Position: Sitting, Cuff Size: Medium Adult)   Pulse 73   Temp 97.3 °F (36.3 °C) (Temporal)   Resp 18   Ht 5' (1.524 m)   Wt 194 lb 6.4 oz (88.2 kg)   SpO2 95%   BMI 37.97 kg/m²     Physical Exam  Vitals reviewed.    Constitutional:       General: She is not in acute distress. Appearance: Normal appearance. She is well-developed. She is not ill-appearing or toxic-appearing. HENT:      Head: Normocephalic and atraumatic. Right Ear: Hearing and external ear normal.      Left Ear: Hearing and external ear normal.      Nose: Nose normal.      Mouth/Throat:      Mouth: Mucous membranes are not pale, not dry and not cyanotic. Eyes:      General: Lids are normal.   Neck:      Trachea: Trachea and phonation normal.   Cardiovascular:      Rate and Rhythm: Normal rate and regular rhythm. Pulses: Normal pulses. Heart sounds: S1 normal and S2 normal.   Pulmonary:      Effort: Pulmonary effort is normal. No tachypnea, bradypnea, accessory muscle usage or respiratory distress. Breath sounds: Normal breath sounds. No decreased breath sounds, wheezing or rales. Chest:      Chest wall: No tenderness. Abdominal:      General: Bowel sounds are normal. There is no distension. Palpations: Abdomen is soft. There is no mass. Tenderness: There is no abdominal tenderness. Genitourinary:      Musculoskeletal:         General: No tenderness. Normal range of motion. Cervical back: Normal range of motion and neck supple. Skin:     General: Skin is warm and dry. Findings: No abrasion, bruising, burn, ecchymosis, erythema, laceration, lesion or rash. Neurological:      Mental Status: She is alert and oriented to person, place, and time. Motor: No tremor, atrophy or abnormal muscle tone. Coordination: Coordination normal.      Gait: Gait normal.      Deep Tendon Reflexes: Reflexes are normal and symmetric. Psychiatric:         Speech: Speech normal.         Behavior: Behavior normal.         Thought Content:  Thought content normal.        Patient Active Problem List   Diagnosis    Hyperlipidemia    Obesity    Status post laparoscopic sleeve gastrectomy    Low back pain    Disorder of right sural nerve    Superficial peroneal nerve neuropathy    Abdominal pannus    Macromastia    Cellulitis    Tarsal tunnel syndrome, left    Anal fistula     Assessment:     1. Status post AEUA and left medial buttock anal fistulotomy  2. Obesity (BMI 37)    Plan:     1. Incision continuing to heal slowly without signs of infection. Continue wound care as directed. Keep guaze over incision at all times. 2. No tub baths or swimming yet  3. Avoid straining or using creams to rectal area. Sitz baths as needed. 4. Bowel function doing well. Stool softeners as needed. 5. Off narcotics. Tylenol as needed for discomfort. 6. Lifting/activity restrictions discussed with patient. Questions answered. At least 2 more weeks then normal activity. 7. Follow up in 2 weeks. Signs and symptoms reviewed with patient that would be concerning and need her to return to office for re-evaluation. Patient states she will call if she has questions or concerns.   8. Weight management follow up as directed      Electronically signed by ANJEL Wang CNP on 6/3/2021 at 7:46 AM

## 2021-06-05 LAB — VITAMIN B1 WHOLE BLOOD: 130 NMOL/L (ref 70–180)

## 2021-06-15 NOTE — PROGRESS NOTES
[address] [place] [address]  Dept: [phone]  Dept Fax: [phone]  Loc: [phone]    Visit Date: 6/16/2021    Magaly Phelan is a 54 y.o. female who presents today for:  Chief Complaint   Patient presents with    Post-Op Check     s/p Anal exam under anesthesia, Left medial buttock anal fistulotomy-4/29/2021 Last seen in the office 6/2/2021       HPI:     HPI    Indigo Stern is a 54-year old female who presents today for follow-up status post AEUA and left medial buttock anal fistulotomy on 4/29/21 with Dr. Julian Garcia. Patient came for a wound check. Incision has completely healed. Patient still denies any rectal pain or discomfort. Denies any rectal bleeding or drainage for the last 3 days. Tolerating regular diet without any nausea or vomiting.  Denies any issues with constipation or diarrhea.  Incision is well approximated without any signs of infection.  No fevers or chills.  Urinating without difficulties.  Denies any shortness of breath or chest pain.  Tolerating increased activity well.      Past Medical History:   Diagnosis Date    Arthritis     Hyperlipidemia       Past Surgical History:   Procedure Laterality Date    ANKLE SURGERY Left 2006    ANKLE SURGERY Right 03/2019    plate and screws    ANUS SURGERY N/A 4/29/2021    ANAL EXAM UNDER ANESTHESIA, LEFT MEDIAL BUTTOCK CYST EXCISION, AND ANAL FISTULOTOMY performed by Karen Díaz MD at [facility] 25 Bilateral 11/9/2020    BILATERAL BREAST MAMMOPLASTY REDUCTION performed by Daniel Do MD at [facility], TOTAL ABDOMINAL      HYSTEROPLASTY      LIPECTOMY N/A 11/9/2020    PANNICULECTOMY performed by Daniel Do MD at [place] [address] 8/12/2019    ROBOTIC SLEEVE GASTRECTOMY performed by Karen Díaz MD at [place]  UPPER GASTROINTESTINAL ENDOSCOPY N/A 2019    EGD WITH BX performed by Isamar Quiroz MD at [facility] 2014    plate and screws       Family History   Problem Relation Age of Onset    Arthritis Mother     High Blood Pressure Mother     Arthritis Father     Diabetes Father     Dementia Father     Parkinsonism Father     High Blood Pressure Father     Cancer Paternal Uncle         colon       Social History     Tobacco Use    Smoking status: Former Smoker     Packs/day: 1.00     Types: Cigarettes     Quit date: 2004     Years since quittin.3    Smokeless tobacco: Never Used   Substance Use Topics    Alcohol use: Yes     Comment: rare      Current Outpatient Medications   Medication Sig Dispense Refill    Biotin w/ Vitamins C & E (HAIR/SKIN/NAILS PO) Take by mouth      naltrexone-buPROPion (CONTRAVE) 8-90 MG per extended release tablet Take 2 tablets by mouth 2 times daily 120 tablet 0    Calcium Citrate-Vitamin D (CALCIUM CITRATE CHEWY BITE PO) Take 1 Dose by mouth daily      zinc gluconate 50 MG tablet Take 50 mg by mouth daily      acyclovir (ZOVIRAX) 800 MG tablet TAKE 1 TABLET BY MOUTH ONCE DAILY      Apple Cider Vinegar 300 MG TABS CIDER VINEGAR Apple Cider Vinegar 300 MG Oral Tablet 2021 Provider: 2021  [facility] 87 ([id number])      B Complex Vitamins (B COMPLEX-B12) TABS B Complex-B12 Oral Tablet B Complex-B12 Oral Tablet 2021 Provider: 2021  [facility] 87 ([id number])      propranolol (INDERAL) 10 MG tablet       venlafaxine 150 MG extended release tablet       Cranberry 200 MG CAPS Take by mouth      Milk Thistle 1000 MG CAPS Take by mouth      Multiple Vitamins-Minerals (THERAPEUTIC MULTIVITAMIN-MINERALS) tablet Take 1 tablet by mouth daily CELEBRATE ONE 45       No current facility-administered medications for this visit.      Allergies   Allergen Reactions    Celebrex [Celecoxib] Other Mouth/Throat:      Mouth: Mucous membranes are not pale, not dry and not cyanotic. Eyes:      General: Lids are normal.   Neck:      Trachea: Trachea and phonation normal.   Cardiovascular:      Rate and Rhythm: Normal rate and regular rhythm. Pulses: Normal pulses. Heart sounds: S1 normal and S2 normal.   Pulmonary:      Effort: Pulmonary effort is normal. No tachypnea, bradypnea, accessory muscle usage or respiratory distress. Breath sounds: Normal breath sounds. No decreased breath sounds, wheezing or rales. Chest:      Chest wall: No tenderness. Abdominal:      General: Bowel sounds are normal. There is no distension. Palpations: Abdomen is soft. There is no mass. Tenderness: There is no abdominal tenderness. Genitourinary:      Musculoskeletal:         General: No tenderness. Normal range of motion. Cervical back: Normal range of motion and neck supple. Skin:     General: Skin is warm and dry. Findings: No abrasion, bruising, burn, ecchymosis, erythema, laceration, lesion or rash. Neurological:      Mental Status: She is alert and oriented to person, place, and time. Motor: No tremor, atrophy or abnormal muscle tone. Coordination: Coordination normal.      Gait: Gait normal.      Deep Tendon Reflexes: Reflexes are normal and symmetric. Psychiatric:         Speech: Speech normal.         Behavior: Behavior normal.         Thought Content: Thought content normal.          Patient Active Problem List   Diagnosis    Hyperlipidemia    Obesity    Status post laparoscopic sleeve gastrectomy    Low back pain    Disorder of right sural nerve    Superficial peroneal nerve neuropathy    Abdominal pannus    Macromastia    Cellulitis    Tarsal tunnel syndrome, left    Anal fistula     Assessment:     1. Status post AEUA and left medial buttock anal fistulotomy  2. Obesity (BMI 37)    Plan:     1. Incision healed without issues  2.  Continue rectal

## 2021-06-16 ENCOUNTER — OFFICE VISIT (OUTPATIENT)
Dept: SURGERY | Age: 51
End: 2021-06-16

## 2021-06-16 VITALS
BODY MASS INDEX: 37.69 KG/M2 | TEMPERATURE: 97.2 F | DIASTOLIC BLOOD PRESSURE: 76 MMHG | OXYGEN SATURATION: 99 % | SYSTOLIC BLOOD PRESSURE: 118 MMHG | WEIGHT: 193 LBS | HEART RATE: 70 BPM | RESPIRATION RATE: 18 BRPM

## 2021-06-16 DIAGNOSIS — K60.3 ANAL FISTULA: Primary | ICD-10-CM

## 2021-06-16 PROCEDURE — 99024 POSTOP FOLLOW-UP VISIT: CPT | Performed by: NURSE PRACTITIONER

## 2021-06-17 ASSESSMENT — ENCOUNTER SYMPTOMS
VOMITING: 0
EYE DISCHARGE: 0
ANAL BLEEDING: 0
CHEST TIGHTNESS: 0
APNEA: 0
BLOOD IN STOOL: 0
WHEEZING: 0
PHOTOPHOBIA: 0
CHOKING: 0
COUGH: 0
ABDOMINAL DISTENTION: 0
ABDOMINAL PAIN: 0
EYE ITCHING: 0
DIARRHEA: 0
CONSTIPATION: 0
SHORTNESS OF BREATH: 0
RECTAL PAIN: 0
ALLERGIC/IMMUNOLOGIC NEGATIVE: 1
COLOR CHANGE: 0
RHINORRHEA: 0
SORE THROAT: 0
EYE PAIN: 0
BACK PAIN: 0
SINUS PRESSURE: 0
NAUSEA: 0

## 2021-06-29 NOTE — PROGRESS NOTES
Patient is a 54 y.o. female seen for follow up MNT visit for ~ 2  year post op. Vitals from current and previous visits:    Vitals 8/9/7096   SYSTOLIC 262   DIASTOLIC 86   Site Right Upper Arm   Position Sitting   Cuff Size Large Adult   Pulse 66   Temp 97.7   Resp 18   SpO2    Weight 193 lb   Height 5' 0\"   Body mass index 37.69 kg/m2      Vitals 5/81/4109   SYSTOLIC    DIASTOLIC    Site    Position    Cuff Size    Pulse    Temp    Resp    SpO2    Weight 186 lb 12.8 oz   Height 5' 0\"   Body mass index 36.48 kg/m2     Recent Post Op Lab Work: June labs 2021- B12 slightly elevated, Elevated lipids chol-293, LDL-182, TG-269, Iron-77, Ferritin-71, B1-130  Lab Results   Component Value Date    VITD25 37 06/01/2021       Date of Surgery: 8/12/2019  Type of Surgery: gastric sleeve      Initial Weight: 277 lbs at pre-op teaching class   Excess  Body Weight Pre-Op: 148  lbs     Weight Loss: 91 lbs. * ~ 25 lb weight gain since one year post op. Pt has lost 7 lbs since June 2nd in office  Patient's Target Weight =  129 lbs for a BMI of ~25  Percentage of Excess Body Weight Lost to date is :  61.4%    Pt started Contrave taking two pills in morning and two pills in evening since early June and states this has helped cravings  Pt saw PCP today and will start on a statin for elevated cholesterol      Admits to increased fried foods and cheese over last six months- increased snacking and grazing along with lack of meal schedule. Pt voices wants to get this under more control. Are you experiencing any physical problems post surgery: No    Are you experiencing any dietary problems post surgery: No  Food Intolerances: Is not tolerating  Grapes    Reports had been craving \"lots of cheeses\" over the last four months    How frequently are you eating? Working on controlling snacking/grazing  How long does it take you to finish a meal? Taking time to eat  How full do you feel after eating?  Pt feels full after eats    Pt had food journal in office for review. Some food items as follows:  Foods- 1/2 avocado, boiled egg  Lunch small salad, thin slices of ham  Or 1/4 cup boiled chicken breast, broccoli and shredded cheese  One meal two hot dogs, baked beans, chips. Pt admits sometimes will eat and then wait and eat some more. Protein intake: 60-80 grams/day- suspect lower end of 60 grams /day without daily protein supplementation   Patient taking protein supplement: Yes. Brand of Supplement: Nectar Protein powder mixed with skim milk several times a week- not daily  Fluid intake:  ~ 6 bottles water a day, , 1 cup skim mlk    Multivitamin/mineral intake:  Celebrate One 45 capsule daily    Calcium intake:  Celebrate Calcium soft chew 500 mg once a day- watermelon flavor    Other: Biotin once a day, , B12 2500 mg every other day with lab level increased, Hair skin nail gummy, zinc 50 mg- told pt can stop the zinc as getting this in [facility]    Does patient exercise: Pt started going back to gym at [employer] three times a week- just started going this week. Having left ankle surgery in a couple months      Assessment  Pt able to identify areas for improvement to prevent additional weight gain post bariatric surgery. Continues with routine vitamin supplementation. Would benefit to include daily protein shake        Plan  Plan/Recommendations:  Goals:  1. Protein goal is 60-80 grams protein per day. Remember to choose protein foods first at meals to meet this goal, followed by vegetables, then fruit, and starch food last if still hungry. 2.  Water goal is 64 oz per day. Separate liquids from solids. No liquids 30 minutes before meals and no liquids 30 minutes after your meals. 3.  Take 20-30 minutes to eat - this helps with mindful eating as well. - stop eating after 30 minutes and wait 30 more minutes-then drink water instead of trying to eat more food .   Do not skip meals and stick to consistent meal times schedule as much as possible. 4.  Physical activity remains important to maintain weight loss efforts  5. Routine vitamin intake is important to avoid deficiencies.   Continue the following:  Celebrate One 45 capsule once a day, Calcium one soft chew a day, B12 every other day at most, can stop Biotin, can continue Hair, Skin and Nail Vitamin gummy        Recommended Follow-Up: two weeks with PA as on Jessica Puga RD, SYED, CELINA, LD  Dietitian- [facility]

## 2021-06-30 ENCOUNTER — INITIAL CONSULT (OUTPATIENT)
Dept: BARIATRICS/WEIGHT MGMT | Age: 51
End: 2021-06-30

## 2021-06-30 VITALS — HEIGHT: 60 IN | WEIGHT: 186.8 LBS | BODY MASS INDEX: 36.67 KG/M2

## 2021-06-30 DIAGNOSIS — Z98.84 STATUS POST BARIATRIC SURGERY: Primary | ICD-10-CM

## 2021-06-30 NOTE — PATIENT INSTRUCTIONS
Goals: 1. Protein goal is 60-80 grams protein per day. Remember to choose protein foods first at meals to meet this goal, followed by vegetables, then fruit, and starch food last if still hungry. 2.  Water goal is 64 oz per day. Separate liquids from solids. No liquids 30 minutes before meals and no liquids 30 minutes after your meals. 3.  Take 20-30 minutes to eat - this helps with mindful eating as well. - stop eating after 30 minutes and wait 30 more minutes-then drink water instead of trying to eat more food . Do not skip meals and stick to consistent meal times schedule as much as possible. 4.  Physical activity remains important to maintain weight loss efforts  5. Routine vitamin intake is important to avoid deficiencies.   Continue the following:  Celebrate One 45 capsule once a day, Calcium one soft chew a day, B12 every other day at most, can stop Biotin, can continue Hair, Skin and Nail Vitamin gummy

## 2021-07-15 ENCOUNTER — INITIAL CONSULT (OUTPATIENT)
Dept: BARIATRICS/WEIGHT MGMT | Age: 51
End: 2021-07-15
Payer: MEDICAID

## 2021-07-15 VITALS
HEIGHT: 60 IN | RESPIRATION RATE: 18 BRPM | DIASTOLIC BLOOD PRESSURE: 78 MMHG | BODY MASS INDEX: 36.28 KG/M2 | TEMPERATURE: 97.9 F | SYSTOLIC BLOOD PRESSURE: 116 MMHG | WEIGHT: 184.8 LBS | HEART RATE: 84 BPM

## 2021-07-15 DIAGNOSIS — Z98.84 STATUS POST LAPAROSCOPIC SLEEVE GASTRECTOMY: ICD-10-CM

## 2021-07-15 DIAGNOSIS — E78.5 HYPERLIPIDEMIA, UNSPECIFIED HYPERLIPIDEMIA TYPE: ICD-10-CM

## 2021-07-15 DIAGNOSIS — F32.A DEPRESSION, UNSPECIFIED DEPRESSION TYPE: ICD-10-CM

## 2021-07-15 DIAGNOSIS — F41.9 ANXIETY: ICD-10-CM

## 2021-07-15 PROCEDURE — G8427 DOCREV CUR MEDS BY ELIG CLIN: HCPCS | Performed by: PHYSICIAN ASSISTANT

## 2021-07-15 PROCEDURE — 99213 OFFICE O/P EST LOW 20 MIN: CPT | Performed by: PHYSICIAN ASSISTANT

## 2021-07-15 PROCEDURE — 1036F TOBACCO NON-USER: CPT | Performed by: PHYSICIAN ASSISTANT

## 2021-07-15 PROCEDURE — G8417 CALC BMI ABV UP PARAM F/U: HCPCS | Performed by: PHYSICIAN ASSISTANT

## 2021-07-15 RX ORDER — ATORVASTATIN CALCIUM 40 MG/1
TABLET, FILM COATED ORAL
COMMUNITY
Start: 2021-06-30 | End: 2022-02-16

## 2021-07-15 RX ORDER — NALTREXONE HYDROCHLORIDE AND BUPROPION HYDROCHLORIDE 8; 90 MG/1; MG/1
2 TABLET, EXTENDED RELEASE ORAL 2 TIMES DAILY
Qty: 120 TABLET | Refills: 0 | Status: SHIPPED | OUTPATIENT
Start: 2021-07-15 | End: 2021-09-13 | Stop reason: SDUPTHER

## 2021-07-15 NOTE — PATIENT INSTRUCTIONS
Stay well hydrated. Drink a minimum of 64 oz of non-carbonated, non-caffeinated fluids daily. Nutritional education occurred during visit. Tolerating diet. Continue following with dietitian and follow their recommendations as directed. Continue  60-80 grams of protein each day. Signs and symptoms reviewed with patient that would be concerning and need her to return to office for re-evaluation. Patient will call if any questions or concerns arrise. Importance of physical activity discussed with patient. Increase physical activity as tolerated  Add strength training  Continue taking Multivitamin, Calcium  Encouraged to attend support groups  Continue following with [facility], [facility], as scheduled  Continue tracking food intake  Continue Contrave- Rx sent today  Will continue monthly follow-up while taking weight loss medication.

## 2021-07-15 NOTE — PROGRESS NOTES
[facility]  [address], [address]  [place]      Visit Date:  7/15/2021  Weight Management Postop Follow-up    HPI:      Peyman Traore is a 54 y.o. female who is here today for follow up on Contrave. She is also almost 2 years post-op  robotic-assisted sleeve gastrectomy performed by Dr. Fatemeh Lackey  on 8/12/19. Weight today 184#. Down 9# since last visit- since starting Contrave. Down 83# since surgery. BMI 36 . Reports that she has been on max dose on Contrave for the last week. only side effect has been dry mouth, which has helped her to increase water intake. Feels that the medication is working great. Appetite much better controlled. No longer having cravings. Making good choices with nutrition. Tracking all food intake. No problems with bowel movements. No nausea. No emesis. No GERD/ Reflux. Denies CP/SOB. No Dizziness. No abdominal pain. Taking and tolerating all vitamins- [id number]/ calcium . Has been getting in dedicated activity 3 times per week- strength training. Would like to continue Contrave. Will follow up in one month. Current BMI: Body mass index is 36.09 kg/m².   Current Weight:   Wt Readings from Last 3 Encounters:   07/15/21 184 lb 12.8 oz (83.8 kg)   06/30/21 186 lb 12.8 oz (84.7 kg)   06/16/21 193 lb (87.5 kg)     Pre-op Body Weight:267  Lowest weight post-op: 161  Prior to starting Contrave: 193      Past Medical History:  Past Medical History:   Diagnosis Date    Arthritis     Hyperlipidemia        Past Surgical History:  Past Surgical History:   Procedure Laterality Date    ANKLE SURGERY Left 2006    ANKLE SURGERY Right 03/2019    plate and screws    ANUS SURGERY N/A 4/29/2021    ANAL EXAM UNDER ANESTHESIA, LEFT MEDIAL BUTTOCK CYST EXCISION, AND ANAL FISTULOTOMY performed by Chico Thakur MD at [place] [address] Bilateral 11/9/2020    BILATERAL BREAST MAMMOPLASTY REDUCTION performed by Meli Lennon MD at [place] [address]      COLONOSCOPY      FRACTURE SURGERY      HYSTERECTOMY, TOTAL ABDOMINAL      HYSTEROPLASTY      LIPECTOMY N/A 2020    PANNICULECTOMY performed by Meli Lennon MD at [facility] N/A 2019    ROBOTIC SLEEVE GASTRECTOMY performed by Justine Scanlon MD at  [facility] N/A 2019    EGD WITH BX performed by Dayna Spencer MD at [address] [place] [address] 2014    plate and screws       Past Social History:  Social History     Socioeconomic History    Marital status:      Spouse name: Not on file    Number of children: Not on file    Years of education: Not on file    Highest education level: Not on file   Occupational History    Not on file   Tobacco Use    Smoking status: Former Smoker     Packs/day: 1.00     Types: Cigarettes     Quit date: 2004     Years since quittin.4    Smokeless tobacco: Never Used   Vaping Use    Vaping Use: Never used   Substance and Sexual Activity    Alcohol use: Yes     Comment: rare    Drug use: No    Sexual activity: Not on file   Other Topics Concern    Not on file   Social History Narrative    Not on file     Social Determinants of Health     Financial Resource Strain:     Difficulty of Paying Living Expenses:    Food Insecurity:     Worried About Running Out of Food in the Last Year:     [address] in the Last Year:    Transportation Needs:     Lack of Transportation (Medical):      Lack of Transportation (Non-Medical):    Physical Activity:     Days of Exercise per Week:     Minutes of Exercise per Session:    Stress:     Feeling of Stress :    Social Connections:     Frequency of Communication with Friends and Family:     Frequency of Social Gatherings with Friends and Family:     Attends Jainism Services:     Active Member of Clubs or Organizations:     Attends Club or Organization Meetings:  Marital Status:    Intimate Partner Violence:     Fear of Current or Ex-Partner:     Emotionally Abused:     Physically Abused:     Sexually Abused:         Medications:   Current Outpatient Medications   Medication Sig Dispense Refill    atorvastatin (LIPITOR) 40 MG tablet TAKE 1 TABLET BY MOUTH ONCE DAILY FOR 90 DAYS      naltrexone-buPROPion (CONTRAVE) 8-90 MG per extended release tablet Take 2 tablets by mouth 2 times daily 120 tablet 0    Biotin w/ Vitamins C & E (HAIR/SKIN/NAILS PO) Take by mouth      Calcium Citrate-Vitamin D (CALCIUM CITRATE CHEWY BITE PO) Take 1 Dose by mouth daily      acyclovir (ZOVIRAX) 800 MG tablet TAKE 1 TABLET BY MOUTH ONCE DAILY      Apple Cider Vinegar 300 MG TABS CIDER VINEGAR Apple Cider Vinegar 300 MG Oral Tablet 02/17/2021 Provider: 02-  [facility] ([id number])      propranolol (INDERAL) 10 MG tablet       venlafaxine 150 MG extended release tablet       Cranberry 200 MG CAPS Take by mouth      Multiple Vitamins-Minerals (THERAPEUTIC MULTIVITAMIN-MINERALS) tablet Take 1 tablet by mouth daily CELEBRATE ONE 45       No current facility-administered medications for this visit. Allergies: Allergies   Allergen Reactions    Celebrex [Celecoxib] Other (See Comments)     Patient states double vision    Toradol [Ketorolac Tromethamine]      Not sure of reaction    Morphine Rash       Subjective:    Review of Systems:  Constitutional: Denies any fever, chills, fatigue. Wound: Denies any rash, skin color changes or wound problems. Resp: Denies any cough, shortness of breath. CV: Denies any chest pain, orthopnea or syncope. MS: Denies myalgias, (+)arthralgias. GI: Denies any nausea, vomiting, diarrhea, constipation, melena, hematochezia. No incisional discomfort. : Denies any hematuria, hesitancy or dysuria. NEURO: Denies seizures, headache.       Objective:    /78 (Site: Right Upper Arm, Position: Sitting, Cuff Size: Large Adult)   Pulse 84   Temp 97.9 °F (36.6 °C) (Infrared)   Resp 18   Ht 5' (1.524 m)   Wt 184 lb 12.8 oz (83.8 kg)   BMI 36.09 kg/m²   Physical Examination:   Constitutional: Alert and oriented to person, place and time. Well-developed, well- nourished. Head: Normocephalic and atraumatic  Neck: Supple. Eyes: EOMI b/l. Conjunctivae normal.  No scleral icterus. Respiratory: Effort normal. No respiratory distress. Abd: Benign  Ext:  Movement x 4. No edema  Skin; Warm and dry, no visible rashes, lesions or ulcers.    Neuro: Cranial Nerves Grossly Intact; nml coordination          Labs:  CBC   Lab Results   Component Value Date    WBC 5.7 06/01/2021    RBC 4.21 06/01/2021    HGB 12.5 06/01/2021    HCT 40.0 06/01/2021    MCV 95.0 06/01/2021    MCH 29.7 06/01/2021    MCHC 31.3 06/01/2021    RDW 14.9 11/29/2020     06/01/2021    MPV 9.8 06/01/2021    SEGSPCT 40.1 06/01/2021    LABLYMP 45.9 06/01/2021    MONOPCT 9.5 06/01/2021    MONOPCT 6.1 08/28/2020    LABEOS 3.2 06/01/2021    BASO 1.1 06/01/2021    NRBC 0 06/01/2021    SEGSABS 2.3 06/01/2021    LYMPHSABS 2.6 06/01/2021    MONOSABS 0.5 06/01/2021    EOSABS 0.2 06/01/2021    BASOSABS 0.1 06/01/2021        BMP/CMP   Lab Results   Component Value Date    GLUCOSE 93 06/01/2021    GLUCOSE 88 08/28/2020    CREATININE 0.5 06/01/2021    BUN 16 06/01/2021     06/01/2021    K 4.1 06/01/2021    K 3.7 11/30/2020     06/01/2021    CO2 27 06/01/2021    CALCIUM 9.6 06/01/2021    AST 16 06/01/2021    ALKPHOS 96 06/01/2021    PROT 7.2 06/01/2021    LABALBU 4.3 06/01/2021    BILITOT 0.4 06/01/2021    ALT 12 06/01/2021        PREALBUMIN   Lab Results   Component Value Date    PREALBUMIN 26.2 06/01/2021        VITAMIN B12   Lab Results   Component Value Date    [id number] 9601 06/01/2021        24 HOUR URINE CALCIUM   No results found for: Vazquez Pall, CALCIUMUR     VITAMIN D   Lab Results   Component Value Date    VITD25 37 06/01/2021        VITAMIN B1/ THIAMINE   Lab Results   Component Value Date    [id number] 130 06/01/2021        RBC FOLATE   Lab Results   Component Value Date    FOLATE 16.4 06/01/2021        LIPID SCREEN (FASTING)   Lab Results   Component Value Date    CHOL 293 06/01/2021    TRIG 269 06/01/2021    HDL 57 06/01/2021    LDLCALC 182 06/01/2021   ,     HGA1C (T2DM ONLY)   Lab Results   Component Value Date    LABA1C 5.0 06/01/2021    AVGG 90 06/01/2021        TSH   Lab Results   Component Value Date    TSH 3.540 06/01/2021        IRON   Lab Results   Component Value Date    IRON 77 06/01/2021        IONIZED CALCIUM   No results found for: IONCA CAION      Assessment:       Diagnosis Orders   1. BMI 36.0-36.9,adult     2. Status post laparoscopic sleeve gastrectomy     3. Depression, unspecified depression type     4. Anxiety     5. Hyperlipidemia, unspecified hyperlipidemia type       Plan:    Stay well hydrated. Drink a minimum of 64 oz of non-carbonated, non-caffeinated fluids daily. Nutritional education occurred during visit. Tolerating diet. Continue following with dietitian and follow their recommendations as directed. Continue  60-80 grams of protein each day. Signs and symptoms reviewed with patient that would be concerning and need her to return to office for re-evaluation. Patient will call if any questions or concerns arrise. Importance of physical activity discussed with patient. Increase physical activity as tolerated  Add strength training  Continue taking Multivitamin, Calcium  Encouraged to attend support groups  Continue following with [facility], as scheduled  Continue tracking food intake  Continue Contrave- Rx sent today  Will continue monthly follow-up while taking weight loss medication. Return in about 1 month (around 8/15/2021) for Follow up. I spent over 20 minutes with the patient, with greater that 50% of that time spent on face counseling for nutrition and exercise.     Electronically signed by TAINA Ojeda on 7/15/2021 at 12:14 PM

## 2021-08-16 ENCOUNTER — TELEPHONE (OUTPATIENT)
Dept: SURGERY | Age: 51
End: 2021-08-16

## 2021-08-16 NOTE — TELEPHONE ENCOUNTER
Appointment has been cancelled d/t the fact that  physician is leaving the state and will no longer service this area

## 2021-09-13 ENCOUNTER — OFFICE VISIT (OUTPATIENT)
Dept: BARIATRICS/WEIGHT MGMT | Age: 51
End: 2021-09-13
Payer: MEDICAID

## 2021-09-13 VITALS
HEART RATE: 68 BPM | HEIGHT: 60 IN | WEIGHT: 176 LBS | SYSTOLIC BLOOD PRESSURE: 100 MMHG | BODY MASS INDEX: 34.55 KG/M2 | TEMPERATURE: 99 F | DIASTOLIC BLOOD PRESSURE: 66 MMHG

## 2021-09-13 DIAGNOSIS — E78.5 HYPERLIPIDEMIA, UNSPECIFIED HYPERLIPIDEMIA TYPE: ICD-10-CM

## 2021-09-13 DIAGNOSIS — F32.A DEPRESSION, UNSPECIFIED DEPRESSION TYPE: ICD-10-CM

## 2021-09-13 DIAGNOSIS — Z98.84 STATUS POST LAPAROSCOPIC SLEEVE GASTRECTOMY: Primary | ICD-10-CM

## 2021-09-13 DIAGNOSIS — F41.9 ANXIETY: ICD-10-CM

## 2021-09-13 PROCEDURE — 99213 OFFICE O/P EST LOW 20 MIN: CPT | Performed by: PHYSICIAN ASSISTANT

## 2021-09-13 PROCEDURE — G8427 DOCREV CUR MEDS BY ELIG CLIN: HCPCS | Performed by: PHYSICIAN ASSISTANT

## 2021-09-13 PROCEDURE — 1036F TOBACCO NON-USER: CPT | Performed by: PHYSICIAN ASSISTANT

## 2021-09-13 PROCEDURE — G8417 CALC BMI ABV UP PARAM F/U: HCPCS | Performed by: PHYSICIAN ASSISTANT

## 2021-09-13 RX ORDER — NALTREXONE HYDROCHLORIDE AND BUPROPION HYDROCHLORIDE 8; 90 MG/1; MG/1
2 TABLET, EXTENDED RELEASE ORAL 2 TIMES DAILY
Qty: 120 TABLET | Refills: 0 | Status: SHIPPED | OUTPATIENT
Start: 2021-09-13 | End: 2021-10-11 | Stop reason: SDUPTHER

## 2021-09-13 RX ORDER — ACETAMINOPHEN 325 MG/1
650 TABLET ORAL EVERY 6 HOURS PRN
COMMUNITY
End: 2022-02-16

## 2021-09-13 NOTE — PATIENT INSTRUCTIONS
Stay well hydrated. Drink a minimum of 64 oz of non-carbonated, non-caffeinated fluids daily. Nutritional education occurred during visit. Tolerating diet. Continue following with dietitian and follow their recommendations as directed. Continue  60-80 grams of protein each day. Signs and symptoms reviewed with patient that would be concerning and need her to return to office for re-evaluation. Patient will call if any questions or concerns arrise. Importance of physical activity discussed with patient. Increase physical activity as tolerated  Add strength training  Continue taking Multivitamin, Calcium   Encouraged to attend support groups  Annual labs reviewed with patient today  SECA scale completed and reviewed with patient today  Measurements completed and reviewed with patient today  Cholesterol 293/ Triglycerides 269- continue Lipitor. Will continue following with PCP.

## 2021-09-13 NOTE — PROGRESS NOTES
[facility]  [address], [address]  [place]      Visit Date:  9/13/2021  Weight Management Postop Follow-up    HPI:      Say Cerna is a 54 y.o. female who is here today for follow up on Contrave. She is also 2 years post-op  robotic-assisted sleeve gastrectomy performed by Dr. Deedee Sotomayor  on 8/12/19. Feeling good overall. No side effects with Contrave. On track with nutrition. Dedicated activity has been limited for last 6-8 weeks due to traveling and ankle pain. She will be following up with Dr. Dalila Mcdermott for ankle pain-believes that she will need surgery. Will need to stop Contrave prior to surgery and not resume until off pain medication. Weight today 176#. Down 8# since last visit. Down 17# since starting Contrave. Down 91# since surgery. BMI 34 . Drinking 6-8 bottles of water daily. Continues to drink protein shakes routinely. No carbonation. No sweets. Tolerating post-op diet. No problems with bowel movements. No nausea. No emesis. No GERD/ Reflux. Denies CP/SOB. No Dizziness. No abdominal pain. Taking and tolerating all vitamins- CelebrateONE 45/ calcium. Labs completed. Cholesterol 293/ Triglycerides 269- started Lipitor. Will be repeating will PCP. Seca scale completed and reviewed. Will follow up in 1 month. Current BMI: Body mass index is 34.37 kg/m².   Current Weight:   Wt Readings from Last 3 Encounters:   09/13/21 176 lb (79.8 kg)   07/15/21 184 lb 12.8 oz (83.8 kg)   06/30/21 186 lb 12.8 oz (84.7 kg)     Pre-op Body Weight:267  Lowest weight post-op: 161  Prior to starting Contrave: 193      Past Medical History:  Past Medical History:   Diagnosis Date    Arthritis     Hyperlipidemia        Past Surgical History:  Past Surgical History:   Procedure Laterality Date    ANKLE SURGERY Left 2006    ANKLE SURGERY Right 03/2019    plate and screws    ANUS SURGERY N/A 4/29/2021    ANAL EXAM UNDER ANESTHESIA, LEFT MEDIAL BUTTOCK CYST EXCISION, AND ANAL FISTULOTOMY performed by Marion Mcmahon MD at [place] [address] Bilateral 2020    BILATERAL BREAST MAMMOPLASTY REDUCTION performed by Radha Jiménez MD at [place] [address]      COLONOSCOPY      FRACTURE SURGERY      HYSTERECTOMY, TOTAL ABDOMINAL      HYSTEROPLASTY      LIPECTOMY N/A 2020    PANNICULECTOMY performed by Radha Jiménez MD at [facility]  N/A 2019    ROBOTIC SLEEVE GASTRECTOMY performed by Marion Mcmahon MD at [facility]. N/A 2019    EGD WITH BX performed by Chrissie Ortez MD at [address] [place] [address] 2014    plate and screws       Past Social History:  Social History     Socioeconomic History    Marital status:      Spouse name: Not on file    Number of children: Not on file    Years of education: Not on file    Highest education level: Not on file   Occupational History    Not on file   Tobacco Use    Smoking status: Former Smoker     Packs/day: 1.00     Types: Cigarettes     Quit date: 2004     Years since quittin.5    Smokeless tobacco: Never Used   Vaping Use    Vaping Use: Never used   Substance and Sexual Activity    Alcohol use: Yes     Comment: rare    Drug use: No    Sexual activity: Not on file   Other Topics Concern    Not on file   Social History Narrative    Not on file     Social Determinants of Health     Financial Resource Strain:     Difficulty of Paying Living Expenses:    Food Insecurity:     Worried About Running Out of Food in the Last Year:     [address] in the Last Year:    Transportation Needs:     Lack of Transportation (Medical):      Lack of Transportation (Non-Medical):    Physical Activity:     Days of Exercise per Week:     Minutes of Exercise per Session:    Stress:     Feeling of Stress :    Social Connections:     Frequency of Communication with Friends and Family:  Frequency of Social Gatherings with Friends and Family:     Attends Gnosticist Services:     Active Member of Clubs or Organizations:     Attends Club or Organization Meetings:     Marital Status:    Intimate Partner Violence:     Fear of Current or Ex-Partner:     Emotionally Abused:     Physically Abused:     Sexually Abused:         Medications:   Current Outpatient Medications   Medication Sig Dispense Refill    acetaminophen (TYLENOL) 325 MG tablet Take 650 mg by mouth every 6 hours as needed for Pain      naltrexone-buPROPion (CONTRAVE) 8-90 MG per extended release tablet Take 2 tablets by mouth 2 times daily 120 tablet 0    atorvastatin (LIPITOR) 40 MG tablet TAKE 1 TABLET BY MOUTH ONCE DAILY FOR 90 DAYS      Biotin w/ Vitamins C & E (HAIR/SKIN/NAILS PO) Take by mouth      Calcium Citrate-Vitamin D (CALCIUM CITRATE CHEWY BITE PO) Take 1 Dose by mouth daily      acyclovir (ZOVIRAX) 800 MG tablet TAKE 1 TABLET BY MOUTH ONCE DAILY      Apple Cider Vinegar 300 MG TABS CIDER VINEGAR Apple Cider Vinegar 300 MG Oral Tablet 02/17/2021 Provider: 02-  [facility] ([id number])      propranolol (INDERAL) 10 MG tablet       venlafaxine 150 MG extended release tablet       Cranberry 200 MG CAPS Take by mouth      Multiple Vitamins-Minerals (THERAPEUTIC MULTIVITAMIN-MINERALS) tablet Take 1 tablet by mouth daily CELEBRATE ONE 45       No current facility-administered medications for this visit. Allergies: Allergies   Allergen Reactions    Celebrex [Celecoxib] Other (See Comments)     Patient states double vision    Toradol [Ketorolac Tromethamine]      Not sure of reaction    Morphine Rash       Subjective:    Review of Systems:  Constitutional: Denies any fever, chills, fatigue. Wound: Denies any rash, skin color changes or wound problems. Resp: Denies any cough, shortness of breath. CV: Denies any chest pain, orthopnea or syncope.    MS: Denies myalgias, (+)arthralgias. GI: Denies any nausea, vomiting, diarrhea, constipation, melena, hematochezia. No incisional discomfort. : Denies any hematuria, hesitancy or dysuria. NEURO: Denies seizures, headache. Objective:    /66 (Site: Right Lower Arm, Position: Sitting, Cuff Size: Large Adult)   Pulse 68   Temp 99 °F (37.2 °C) (Oral)   Ht 5' (1.524 m)   Wt 176 lb (79.8 kg)   BMI 34.37 kg/m²   Physical Examination:   Constitutional: Alert and oriented to person, place and time. Well-developed, well- nourished. Head: Normocephalic and atraumatic  Neck: Supple. Eyes: EOMI b/l. Conjunctivae normal.  No scleral icterus. Respiratory: Effort normal. No respiratory distress. Abd: Benign  Ext:  Movement x 4. No edema  Skin; Warm and dry, no visible rashes, lesions or ulcers.    Neuro: Cranial Nerves Grossly Intact; nml coordination      Labs:  CBC   Lab Results   Component Value Date    WBC 5.7 06/01/2021    RBC 4.21 06/01/2021    HGB 12.5 06/01/2021    HCT 40.0 06/01/2021    MCV 95.0 06/01/2021    MCH 29.7 06/01/2021    MCHC 31.3 06/01/2021    RDW 14.9 11/29/2020     06/01/2021    MPV 9.8 06/01/2021    SEGSPCT 40.1 06/01/2021    LABLYMP 45.9 06/01/2021    MONOPCT 9.5 06/01/2021    MONOPCT 6.1 08/28/2020    LABEOS 3.2 06/01/2021    BASO 1.1 06/01/2021    NRBC 0 06/01/2021    SEGSABS 2.3 06/01/2021    LYMPHSABS 2.6 06/01/2021    MONOSABS 0.5 06/01/2021    EOSABS 0.2 06/01/2021    BASOSABS 0.1 06/01/2021        BMP/CMP   Lab Results   Component Value Date    GLUCOSE 93 06/01/2021    GLUCOSE 88 08/28/2020    CREATININE 0.5 06/01/2021    BUN 16 06/01/2021     06/01/2021    K 4.1 06/01/2021    K 3.7 11/30/2020     06/01/2021    CO2 27 06/01/2021    CALCIUM 9.6 06/01/2021    AST 16 06/01/2021    ALKPHOS 96 06/01/2021    PROT 7.2 06/01/2021    LABALBU 4.3 06/01/2021    BILITOT 0.4 06/01/2021    ALT 12 06/01/2021        PREALBUMIN   Lab Results   Component Value Date    PREALBUMIN 26.2 06/01/2021 VITAMIN B12   Lab Results   Component Value Date    [id number] 8829 06/01/2021        24 HOUR URINE CALCIUM   No results found for: Aminah John, CALCIUMUR     VITAMIN D   Lab Results   Component Value Date    VITD25 37 06/01/2021        VITAMIN B1/ THIAMINE   Lab Results   Component Value Date    [id number] 130 06/01/2021        RBC FOLATE   Lab Results   Component Value Date    FOLATE 16.4 06/01/2021        LIPID SCREEN (FASTING)   Lab Results   Component Value Date    CHOL 293 06/01/2021    TRIG 269 06/01/2021    HDL 57 06/01/2021    LDLCALC 182 06/01/2021   ,     HGA1C (T2DM ONLY)   Lab Results   Component Value Date    LABA1C 5.0 06/01/2021    AVGG 90 06/01/2021        TSH   Lab Results   Component Value Date    TSH 3.540 06/01/2021        IRON   Lab Results   Component Value Date    IRON 77 06/01/2021        IONIZED CALCIUM   No results found for: IONCA, CAION      Assessment:       Diagnosis Orders   1. Status post laparoscopic sleeve gastrectomy  naltrexone-buPROPion (CONTRAVE) 8-90 MG per extended release tablet   2. BMI 34.0-34.9,adult  naltrexone-buPROPion (CONTRAVE) 8-90 MG per extended release tablet   3. Depression, unspecified depression type     4. Hyperlipidemia, unspecified hyperlipidemia type     5. Anxiety       Plan:    Stay well hydrated. Drink a minimum of 64 oz of non-carbonated, non-caffeinated fluids daily. Nutritional education occurred during visit. Tolerating diet. Continue following with dietitian and follow their recommendations as directed. Continue  60-80 grams of protein each day. Signs and symptoms reviewed with patient that would be concerning and need her to return to office for re-evaluation. Patient will call if any questions or concerns arrise. Importance of physical activity discussed with patient.    Increase physical activity as tolerated  Add strength training  Continue taking Multivitamin, Calcium   Encouraged to attend support groups  Annual labs reviewed with patient today  SECA scale completed and reviewed with patient today  Measurements completed and reviewed with patient today  Cholesterol 293/ Triglycerides 269- continue Lipitor. Will continue following with PCP. Continue Contrave- rx sent  If moving forward with ankle surgery- will need to hold Contrave and wait to resume until off all narcotic pain medication. Return in about 1 month (around 10/13/2021) for Follow up. I spent over 20 minutes with the patient, with greater that 50% of that time spent on face counseling for nutrition and exercise.     Electronically signed by TAINA Song on 9/13/2021 at 3:56 PM

## 2021-10-11 ENCOUNTER — OFFICE VISIT (OUTPATIENT)
Dept: BARIATRICS/WEIGHT MGMT | Age: 51
End: 2021-10-11
Payer: MEDICAID

## 2021-10-11 VITALS
BODY MASS INDEX: 33.85 KG/M2 | HEART RATE: 88 BPM | HEIGHT: 60 IN | WEIGHT: 172.4 LBS | SYSTOLIC BLOOD PRESSURE: 132 MMHG | RESPIRATION RATE: 18 BRPM | DIASTOLIC BLOOD PRESSURE: 88 MMHG

## 2021-10-11 DIAGNOSIS — F41.9 ANXIETY: ICD-10-CM

## 2021-10-11 DIAGNOSIS — E78.5 HYPERLIPIDEMIA, UNSPECIFIED HYPERLIPIDEMIA TYPE: ICD-10-CM

## 2021-10-11 DIAGNOSIS — Z98.84 STATUS POST LAPAROSCOPIC SLEEVE GASTRECTOMY: ICD-10-CM

## 2021-10-11 DIAGNOSIS — F32.A DEPRESSION, UNSPECIFIED DEPRESSION TYPE: ICD-10-CM

## 2021-10-11 PROCEDURE — G8427 DOCREV CUR MEDS BY ELIG CLIN: HCPCS | Performed by: PHYSICIAN ASSISTANT

## 2021-10-11 PROCEDURE — G8417 CALC BMI ABV UP PARAM F/U: HCPCS | Performed by: PHYSICIAN ASSISTANT

## 2021-10-11 PROCEDURE — G8484 FLU IMMUNIZE NO ADMIN: HCPCS | Performed by: PHYSICIAN ASSISTANT

## 2021-10-11 PROCEDURE — 99213 OFFICE O/P EST LOW 20 MIN: CPT | Performed by: PHYSICIAN ASSISTANT

## 2021-10-11 PROCEDURE — 1036F TOBACCO NON-USER: CPT | Performed by: PHYSICIAN ASSISTANT

## 2021-10-11 RX ORDER — NALTREXONE HYDROCHLORIDE AND BUPROPION HYDROCHLORIDE 8; 90 MG/1; MG/1
2 TABLET, EXTENDED RELEASE ORAL 2 TIMES DAILY
Qty: 120 TABLET | Refills: 0 | Status: SHIPPED | OUTPATIENT
Start: 2021-10-11 | End: 2022-02-16

## 2021-10-11 NOTE — PATIENT INSTRUCTIONS
Stay well hydrated. Drink a minimum of 64 oz of non-carbonated, non-caffeinated fluids daily. Nutritional education occurred during visit. Tolerating diet. Continue following with dietitian and follow their recommendations as directed. Continue  60-80 grams of protein each day. Signs and symptoms reviewed with patient that would be concerning and need her to return to office for re-evaluation. Patient will call if any questions or concerns arrise. Importance of physical activity discussed with patient. Increase physical activity as tolerated  Add strength training  Continue taking Multivitamin, Calcium   Encouraged to attend support groups  Continue Contrave- rx sent  Awaiting left ankle surgery-will need to stop Contrave at least 1 week prior to surgery and wait to resume until off all narcotic pain medication.

## 2021-10-11 NOTE — PROGRESS NOTES
[facility]  [address], [address]  [place]      Visit Date:  10/11/2021  Weight Management Postop Follow-up    HPI:      Eugenia Flores is a 54 y.o. female who is here today for monthly follow up on Contrave. She is also just over 2 years post-op  robotic-assisted sleeve gastrectomy performed by Dr. Mariela Morse  on 8/12/19. Weight today 172#. Down 4# since last month. Down 21# since starting on Contrave. Down 95# overall since sleeve gastrectomy. BMI 33. Reports that she continues to do well with Contrave. Continues on max dose. Denies any side effects with medication. Reports that  Medication is really helping reduce appetite and cravings. Reports that she is doing well with nutrition. Getting in plenty of protein. Continues to drink protein shakes 3-4 times per week. Making good choices with nutrition. Drinking plenty of water. Over 64oz daily. No carbonation. Avoiding sweets most of the time. Reports that her activity continues to be limited due to issues with chronic ankle pain. Continues to walk daily for about 30 minutes. She is planning to proceed with left ankle surgery in late November/ early December. Will stop Contrave at least 1 week prior to surgery and not resume until off all narcotic pain medication. No nausea. No vomiting. No GERD. No CP . No SOB. Continues to take CelebrateONE 45 daily/ Calcium daily. Will follow up in 1 month. Current BMI: Body mass index is 33.67 kg/m².   Current Weight:   Wt Readings from Last 3 Encounters:   10/11/21 172 lb 6.4 oz (78.2 kg)   09/13/21 176 lb (79.8 kg)   07/15/21 184 lb 12.8 oz (83.8 kg)     Pre-op Body Weight:267  Lowest weight post-op: 161  Prior to starting Contrave: 193      Past Medical History:  Past Medical History:   Diagnosis Date    Arthritis     Hyperlipidemia        Past Surgical History:  Past Surgical History:   Procedure Laterality Date    ANKLE SURGERY Left 2006    ANKLE SURGERY Right 2019    plate and screws    ANUS SURGERY N/A 2021    ANAL EXAM UNDER ANESTHESIA, LEFT MEDIAL BUTTOCK CYST EXCISION, AND ANAL FISTULOTOMY performed by Claudell Ram, MD at [place] [address] Bilateral 2020    BILATERAL BREAST MAMMOPLASTY REDUCTION performed by Kimi Morse MD at [address] [place] COLONOSCOPY      FRACTURE SURGERY      HYSTERECTOMY, TOTAL ABDOMINAL      HYSTEROPLASTY      LIPECTOMY N/A 2020    PANNICULECTOMY [facility] by Kimi Morse MD at [facility] N/A 2019    ROBOTIC SLEEVE GASTRECTOMY performed by Claudell Ram, MD at [address] [place] [address] N/A 2019    EGD WITH BX performed by Pepe Staton MD at [address] [place] [address]     plate and screws       Past Social History:  Social History     Socioeconomic History    Marital status:      Spouse name: Not on file    Number of children: Not on file    Years of education: Not on file    Highest education level: Not on file   Occupational History    Not on file   Tobacco Use    Smoking status: Former Smoker     Packs/day: 1.00     Types: Cigarettes     Quit date: 2004     Years since quittin.6    Smokeless tobacco: Never Used   Vaping Use    Vaping Use: Never used   Substance and Sexual Activity    Alcohol use: Yes     Comment: rare    Drug use: No    Sexual activity: Not on file   Other Topics Concern    Not on file   Social History Narrative    Not on file     Social Determinants of Health     Financial Resource Strain:     Difficulty of Paying Living Expenses:    Food Insecurity:     Worried About [address] in the Last Year:     [facility] in the Last Year:    Transportation Needs:     Lack of Transportation (Medical):      Lack of Transportation (Non-Medical):    Physical Activity:     Days of Exercise per Week:     Minutes of Exercise per Session:    Stress:     Feeling of Stress :    Social Connections:     Frequency of Communication with Friends and Family:     Frequency of Social Gatherings with Friends and Family:     Attends Episcopal Services:     Active Member of Clubs or Organizations:     Attends Club or Organization Meetings:     Marital Status:    Intimate Partner Violence:     Fear of Current or Ex-Partner:     Emotionally Abused:     Physically Abused:     Sexually Abused:         Medications:   Current Outpatient Medications   Medication Sig Dispense Refill    naltrexone-buPROPion (CONTRAVE) 8-90 MG per extended release tablet Take 2 tablets by mouth 2 times daily 120 tablet 0    acetaminophen (TYLENOL) 325 MG tablet Take 650 mg by mouth every 6 hours as needed for Pain      atorvastatin (LIPITOR) 40 MG tablet TAKE 1 TABLET BY MOUTH ONCE DAILY FOR 90 DAYS      Biotin w/ Vitamins C & E (HAIR/SKIN/NAILS PO) Take by mouth      Calcium Citrate-Vitamin D (CALCIUM CITRATE CHEWY BITE PO) Take 1 Dose by mouth daily      acyclovir (ZOVIRAX) 800 MG tablet TAKE 1 TABLET BY MOUTH ONCE DAILY      Apple Cider Vinegar 300 MG TABS CIDER VINEGAR Apple Cider Vinegar 300 MG Oral Tablet 02/17/2021 Provider: 02-  [facility] ([id number])      propranolol (INDERAL) 10 MG tablet       venlafaxine 150 MG extended release tablet       Cranberry 200 MG CAPS Take by mouth      Multiple Vitamins-Minerals (THERAPEUTIC MULTIVITAMIN-MINERALS) tablet Take 1 tablet by mouth daily CELEBRATE ONE 45       No current facility-administered medications for this visit. Allergies: Allergies   Allergen Reactions    Celebrex [Celecoxib] Other (See Comments)     Patient states double vision    Toradol [Ketorolac Tromethamine]      Not sure of reaction    Morphine Rash       Subjective:    Review of Systems:  Constitutional: Denies any fever, chills, fatigue.   Wound: Denies any rash, skin color changes or wound problems. Resp: Denies any cough, shortness of breath. CV: Denies any chest pain, orthopnea or syncope. MS: Denies myalgias, (+)arthralgias. GI: Denies any nausea, vomiting, diarrhea, constipation, melena, hematochezia. No incisional discomfort. : Denies any hematuria, hesitancy or dysuria. NEURO: Denies seizures, headache. Objective:    /88 (Site: Right Upper Arm, Position: Sitting, Cuff Size: Large Adult)   Pulse 88   Resp 18   Ht 5' (1.524 m)   Wt 172 lb 6.4 oz (78.2 kg)   BMI 33.67 kg/m²   Physical Examination:   Constitutional: Alert and oriented to person, place and time. Well-developed, well- nourished. Head: Normocephalic and atraumatic  Neck: Supple. Eyes: EOMI b/l. Conjunctivae normal.  No scleral icterus. Respiratory: Effort normal. No respiratory distress. Abd: Benign  Ext:  Movement x 4. No edema  Skin; Warm and dry, no visible rashes, lesions or ulcers.    Neuro: Cranial Nerves Grossly Intact; nml coordination      Labs:  CBC   Lab Results   Component Value Date    WBC 5.7 06/01/2021    RBC 4.21 06/01/2021    HGB 12.5 06/01/2021    HCT 40.0 06/01/2021    MCV 95.0 06/01/2021    MCH 29.7 06/01/2021    MCHC 31.3 06/01/2021    RDW 14.9 11/29/2020     06/01/2021    MPV 9.8 06/01/2021    SEGSPCT 40.1 06/01/2021    LABLYMP 45.9 06/01/2021    MONOPCT 9.5 06/01/2021    MONOPCT 6.1 08/28/2020    LABEOS 3.2 06/01/2021    BASO 1.1 06/01/2021    NRBC 0 06/01/2021    SEGSABS 2.3 06/01/2021    LYMPHSABS 2.6 06/01/2021    MONOSABS 0.5 06/01/2021    EOSABS 0.2 06/01/2021    BASOSABS 0.1 06/01/2021        BMP/CMP   Lab Results   Component Value Date    GLUCOSE 93 06/01/2021    GLUCOSE 88 08/28/2020    CREATININE 0.5 06/01/2021    BUN 16 06/01/2021     06/01/2021    K 4.1 06/01/2021    K 3.7 11/30/2020     06/01/2021    CO2 27 06/01/2021    CALCIUM 9.6 06/01/2021    AST 16 06/01/2021    ALKPHOS 96 06/01/2021    PROT 7.2 06/01/2021    LABALBU 4.3 06/01/2021    BILITOT 0.4 06/01/2021    ALT 12 06/01/2021        PREALBUMIN   Lab Results   Component Value Date    PREALBUMIN 26.2 06/01/2021        VITAMIN B12   Lab Results   Component Value Date    [id number] 0936 06/01/2021        24 HOUR URINE CALCIUM   No results found for: MARQUIS Mabry     VITAMIN D   Lab Results   Component Value Date    VITD25 37 06/01/2021        VITAMIN B1/ THIAMINE   Lab Results   Component Value Date    [id number] 130 06/01/2021        RBC FOLATE   Lab Results   Component Value Date    FOLATE 16.4 06/01/2021        LIPID SCREEN (FASTING)   Lab Results   Component Value Date    CHOL 293 06/01/2021    TRIG 269 06/01/2021    HDL 57 06/01/2021    LDLCALC 182 06/01/2021   ,     HGA1C (T2DM ONLY)   Lab Results   Component Value Date    LABA1C 5.0 06/01/2021    AVGG 90 06/01/2021        TSH   Lab Results   Component Value Date    TSH 3.540 06/01/2021        IRON   Lab Results   Component Value Date    IRON 77 06/01/2021        IONIZED CALCIUM   No results found for: MACKENZIE DALLAS      Assessment:       Diagnosis Orders   1. BMI 33.0-33.9,adult  naltrexone-buPROPion (CONTRAVE) 8-90 MG per extended release tablet   2. Status post laparoscopic sleeve gastrectomy  naltrexone-buPROPion (CONTRAVE) 8-90 MG per extended release tablet   3. Depression, unspecified depression type     4. Hyperlipidemia, unspecified hyperlipidemia type     5. Anxiety       Plan:    Stay well hydrated. Drink a minimum of 64 oz of non-carbonated, non-caffeinated fluids daily. Nutritional education occurred during visit. Tolerating diet. Continue following with dietitian and follow their recommendations as directed. Continue  60-80 grams of protein each day. Signs and symptoms reviewed with patient that would be concerning and need her to return to office for re-evaluation. Patient will call if any questions or concerns arrise. Importance of physical activity discussed with patient.    Increase physical activity as tolerated  Add strength training  Continue taking Multivitamin, Calcium   Encouraged to attend support groups  Continue Contrave- rx sent  Awaiting left ankle surgery-will need to stop Contrave at least 1 week prior to surgery and wait to resume until off all narcotic pain medication. Return in about 1 month (around 11/11/2021) for Follow up. I spent over 20 minutes with the patient, with greater that 50% of that time spent on face counseling for nutrition and exercise.     Electronically signed by TAINA Martin on 10/11/2021 at 11:07 AM

## 2021-11-08 ENCOUNTER — INITIAL CONSULT (OUTPATIENT)
Dept: BARIATRICS/WEIGHT MGMT | Age: 51
End: 2021-11-08
Payer: MEDICAID

## 2021-11-08 VITALS
DIASTOLIC BLOOD PRESSURE: 82 MMHG | RESPIRATION RATE: 18 BRPM | HEART RATE: 72 BPM | WEIGHT: 169 LBS | TEMPERATURE: 97.9 F | HEIGHT: 60 IN | SYSTOLIC BLOOD PRESSURE: 104 MMHG | BODY MASS INDEX: 33.18 KG/M2

## 2021-11-08 DIAGNOSIS — F32.A DEPRESSION, UNSPECIFIED DEPRESSION TYPE: ICD-10-CM

## 2021-11-08 DIAGNOSIS — Z98.84 STATUS POST LAPAROSCOPIC SLEEVE GASTRECTOMY: ICD-10-CM

## 2021-11-08 DIAGNOSIS — E78.5 HYPERLIPIDEMIA, UNSPECIFIED HYPERLIPIDEMIA TYPE: ICD-10-CM

## 2021-11-08 DIAGNOSIS — M25.572 CHRONIC PAIN OF LEFT ANKLE: ICD-10-CM

## 2021-11-08 DIAGNOSIS — F41.9 ANXIETY: ICD-10-CM

## 2021-11-08 DIAGNOSIS — G89.29 CHRONIC PAIN OF LEFT ANKLE: ICD-10-CM

## 2021-11-08 PROCEDURE — 99213 OFFICE O/P EST LOW 20 MIN: CPT | Performed by: PHYSICIAN ASSISTANT

## 2021-11-08 PROCEDURE — G8427 DOCREV CUR MEDS BY ELIG CLIN: HCPCS | Performed by: PHYSICIAN ASSISTANT

## 2021-11-08 PROCEDURE — 1036F TOBACCO NON-USER: CPT | Performed by: PHYSICIAN ASSISTANT

## 2021-11-08 PROCEDURE — G8417 CALC BMI ABV UP PARAM F/U: HCPCS | Performed by: PHYSICIAN ASSISTANT

## 2021-11-08 PROCEDURE — G8484 FLU IMMUNIZE NO ADMIN: HCPCS | Performed by: PHYSICIAN ASSISTANT

## 2021-11-08 NOTE — PROGRESS NOTES
[facility]  [address], [address]  [place]      Visit Date:  11/8/2021  Weight Management Postop Follow-up    HPI:      Isaac Marlow is a 54 y.o. female who is here today for monthly follow up on Contrave. She is also just over 2 years post-op  robotic-assisted sleeve gastrectomy performed by Dr. Bianca Wyatt  on 8/12/19. Weight today 169#. Down 3# since last month. Down 24# since starting on Contrave. BMI 33. Stopped Contrave about 1 week ago as she is scheduled for left ankle surgery with Dr. Bird Stephenson on 11/19/21. Will be non-weight bearing for several weeks post-op. Has been doing well with nutrition. Staying on track. Drinking about a gallon of water daily. Continues to get in protein shake every other day. Plans to increase to daily post-op. No carbonation. No sweets. Tolerating post-op diet. No problems with bowel movements. No nausea. No emesis. No GERD/ Reflux. Taking and tolerating all vitamins- CelebrateONE 45/ Calcium daily. Will follow up in 3 months to discuss resuming Contrave. Current BMI: Body mass index is 33.01 kg/m².   Current Weight:   Wt Readings from Last 3 Encounters:   11/08/21 169 lb (76.7 kg)   10/11/21 172 lb 6.4 oz (78.2 kg)   09/13/21 176 lb (79.8 kg)     Pre-op Body Weight:267  Lowest weight post-op: 161  Prior to starting Contrave: 193      Past Medical History:  Past Medical History:   Diagnosis Date    Arthritis     Hyperlipidemia        Past Surgical History:  Past Surgical History:   Procedure Laterality Date    ANKLE SURGERY Left 2006    ANKLE SURGERY Right 03/2019    plate and screws    ANUS SURGERY N/A 4/29/2021    ANAL EXAM UNDER ANESTHESIA, LEFT MEDIAL BUTTOCK CYST EXCISION, AND ANAL FISTULOTOMY performed by Ya Valdivia MD at [facility] Bilateral 11/9/2020    BILATERAL BREAST MAMMOPLASTY REDUCTION performed by Alexa Cruz MD at [address] [place] [address] SURGERY      COLONOSCOPY      FRACTURE SURGERY      HYSTERECTOMY, TOTAL ABDOMINAL      HYSTEROPLASTY      LIPECTOMY N/A 2020    PANNICULECTOMY performed by Marcos Latif MD at [address] N/A 2019    ROBOTIC SLEEVE GASTRECTOMY performed by Chase Nelson MD at [facility] N/A 2019    EGD WITH BX performed by Erika Torres MD at [facility] 2014    plate and screws       Past Social History:  Social History     Socioeconomic History    Marital status:      Spouse name: Not on file    Number of children: Not on file    Years of education: Not on file    Highest education level: Not on file   Occupational History    Not on file   Tobacco Use    Smoking status: Former Smoker     Packs/day: 1.00     Types: Cigarettes     Quit date: 2004     Years since quittin.7    Smokeless tobacco: Never Used   Vaping Use    Vaping Use: Never used   Substance and Sexual Activity    Alcohol use: Yes     Comment: rare    Drug use: No    Sexual activity: Not on file   Other Topics Concern    Not on file   Social History Narrative    Not on file     Social Determinants of Health     Financial Resource Strain:     Difficulty of Paying Living Expenses: Not on file   Food Insecurity:     Worried About [address] in the Last Year: Not on file    [address] [facility] [address] in the Last Year: Not on file   Transportation Needs:     Lack of Transportation (Medical): Not on file    Lack of Transportation (Non-Medical):  Not on file   Physical Activity:     Days of Exercise per Week: Not on file    Minutes of Exercise per Session: Not on file   Stress:     Feeling of Stress : Not on file   Social Connections:     Frequency of Communication with Friends and Family: Not on file    Frequency of Social Gatherings with Friends and Family: Not on file    Attends Anabaptist Services: Not on file    Active Member of Clubs or Organizations: Not on file    Attends Club or Organization Meetings: Not on file    Marital Status: Not on file   Intimate Partner Violence:     Fear of Current or Ex-Partner: Not on file    Emotionally Abused: Not on file    Physically Abused: Not on file    Sexually Abused: Not on file   Housing Stability:     Unable to Pay for Housing in the Last Year: Not on file    Number of [place] in the Last Year: Not on file    Unstable Housing in the Last Year: Not on file        Medications:   Current Outpatient Medications   Medication Sig Dispense Refill    naltrexone-buPROPion (CONTRAVE) 8-90 MG per extended release tablet Take 2 tablets by mouth 2 times daily 120 tablet 0    atorvastatin (LIPITOR) 40 MG tablet TAKE 1 TABLET BY MOUTH ONCE DAILY FOR 90 DAYS      Biotin w/ Vitamins C & E (HAIR/SKIN/NAILS PO) Take by mouth      Calcium Citrate-Vitamin D (CALCIUM CITRATE CHEWY BITE PO) Take 1 Dose by mouth daily      acyclovir (ZOVIRAX) 800 MG tablet TAKE 1 TABLET BY MOUTH ONCE DAILY      Apple Cider Vinegar 300 MG TABS CIDER VINEGAR Apple Cider Vinegar 300 MG Oral Tablet 02/17/2021 Provider: 02-  [facility] ([id number])      propranolol (INDERAL) 10 MG tablet       venlafaxine 150 MG extended release tablet       Cranberry 200 MG CAPS Take by mouth      Multiple Vitamins-Minerals (THERAPEUTIC MULTIVITAMIN-MINERALS) tablet Take 1 tablet by mouth daily CELEBRATE ONE 45      acetaminophen (TYLENOL) 325 MG tablet Take 650 mg by mouth every 6 hours as needed for Pain (Patient not taking: Reported on 11/8/2021)       No current facility-administered medications for this visit. Allergies:    Allergies   Allergen Reactions    Celebrex [Celecoxib] Other (See Comments)     Patient states double vision    Toradol [Ketorolac Tromethamine]      Not sure of reaction    Morphine Rash       Subjective:    Review of Systems:  Constitutional: Denies any fever, chills, fatigue. Wound: Denies any rash, skin color changes or wound problems. Resp: Denies any cough, shortness of breath. CV: Denies any chest pain, orthopnea or syncope. MS: Denies myalgias, (+)arthralgias. GI: Denies any nausea, vomiting, diarrhea, constipation, melena, hematochezia. No incisional discomfort. : Denies any hematuria, hesitancy or dysuria. NEURO: Denies seizures, headache. Objective:    /82 (Site: Right Upper Arm, Position: Sitting, Cuff Size: Large Adult)   Pulse 72   Temp 97.9 °F (36.6 °C) (Infrared)   Resp 18   Ht 5' (1.524 m)   Wt 169 lb (76.7 kg)   BMI 33.01 kg/m²   Physical Examination:   Constitutional: Alert and oriented to person, place and time. Well-developed, well- nourished. Head: Normocephalic and atraumatic  Neck: Supple. Eyes: EOMI b/l. Conjunctivae normal.  No scleral icterus. Respiratory: Effort normal. No respiratory distress. Abd: Benign  Ext:  Movement x 4. No edema  Skin; Warm and dry, no visible rashes, lesions or ulcers.    Neuro: Cranial Nerves Grossly Intact; nml coordination      Labs:  CBC   Lab Results   Component Value Date    WBC 5.7 06/01/2021    RBC 4.21 06/01/2021    HGB 12.5 06/01/2021    HCT 40.0 06/01/2021    MCV 95.0 06/01/2021    MCH 29.7 06/01/2021    MCHC 31.3 06/01/2021    RDW 14.9 11/29/2020     06/01/2021    MPV 9.8 06/01/2021    SEGSPCT 40.1 06/01/2021    LABLYMP 45.9 06/01/2021    MONOPCT 9.5 06/01/2021    MONOPCT 6.1 08/28/2020    LABEOS 3.2 06/01/2021    BASO 1.1 06/01/2021    NRBC 0 06/01/2021    SEGSABS 2.3 06/01/2021    LYMPHSABS 2.6 06/01/2021    MONOSABS 0.5 06/01/2021    EOSABS 0.2 06/01/2021    BASOSABS 0.1 06/01/2021        BMP/CMP   Lab Results   Component Value Date    GLUCOSE 93 06/01/2021    GLUCOSE 88 08/28/2020    CREATININE 0.5 06/01/2021    BUN 16 06/01/2021     06/01/2021    K 4.1 06/01/2021    K 3.7 11/30/2020     06/01/2021    CO2 27 06/01/2021    CALCIUM 9.6 06/01/2021    AST 16 06/01/2021    ALKPHOS 96 06/01/2021    PROT 7.2 06/01/2021    LABALBU 4.3 06/01/2021    BILITOT 0.4 06/01/2021    ALT 12 06/01/2021        PREALBUMIN   Lab Results   Component Value Date    PREALBUMIN 26.2 06/01/2021        VITAMIN B12   Lab Results   Component Value Date    [id number] 7836 06/01/2021        24 HOUR URINE CALCIUM   No results found for: Star Cous[place], CALCIUMUR     VITAMIN D   Lab Results   Component Value Date    VITD25 37 06/01/2021        VITAMIN B1/ THIAMINE   Lab Results   Component Value Date    [id number] 130 06/01/2021        RBC FOLATE   Lab Results   Component Value Date    FOLATE 16.4 06/01/2021        LIPID SCREEN (FASTING)   Lab Results   Component Value Date    CHOL 293 06/01/2021    TRIG 269 06/01/2021    HDL 57 06/01/2021    LDLCALC 182 06/01/2021   ,     HGA1C (T2DM ONLY)   Lab Results   Component Value Date    LABA1C 5.0 06/01/2021    AVGG 90 06/01/2021        TSH   Lab Results   Component Value Date    TSH 3.540 06/01/2021        IRON   Lab Results   Component Value Date    IRON 77 06/01/2021        IONIZED CALCIUM   No results found for: MACKENZIE DALLAS      Assessment:       Diagnosis Orders   1. BMI 33.0-33.9,adult     2. Status post laparoscopic sleeve gastrectomy     3. Depression, unspecified depression type     4. Hyperlipidemia, unspecified hyperlipidemia type     5. Anxiety     6. Chronic pain of left ankle       Plan:    Stay well hydrated. Drink a minimum of 64 oz of non-carbonated, non-caffeinated fluids daily. Nutritional education occurred during visit. Tolerating diet. Continue following with dietitian and follow their recommendations as directed. Continue  60-80 grams of protein each day. Signs and symptoms reviewed with patient that would be concerning and need her to return to office for re-evaluation. Patient will call if any questions or concerns arrise. Importance of physical activity discussed with patient.    Increase physical activity as tolerated  Continue taking Multivitamin, Calcium  Encouraged to attend support groups  Ankle surgery 11/19/21 with Dr. Shanna Aguila. Will follow up in 3 months to discuss resuming Contrave. Return in about 3 months (around 2/8/2022) for postop follow up. I spent over 20 minutes with the patient, with greater that 50% of that time spent on face counseling for nutrition and exercise.     Electronically signed by Jacqueline Severs, PA on 11/8/2021 at 10:40 AM

## 2021-11-08 NOTE — PATIENT INSTRUCTIONS
Stay well hydrated. Drink a minimum of 64 oz of non-carbonated, non-caffeinated fluids daily. Nutritional education occurred during visit. Tolerating diet. Continue following with dietitian and follow their recommendations as directed. Continue  60-80 grams of protein each day. Signs and symptoms reviewed with patient that would be concerning and need her to return to office for re-evaluation. Patient will call if any questions or concerns arrise. Importance of physical activity discussed with patient. Increase physical activity as tolerated  Continue taking Multivitamin, Calcium  Encouraged to attend support groups  Ankle surgery 11/19/21 with Dr. Amber Rodríguez. Will follow up in 3 months to discuss resuming Contrave.

## 2022-01-13 ENCOUNTER — HOSPITAL ENCOUNTER (OUTPATIENT)
Dept: PHYSICAL THERAPY | Age: 52
Setting detail: THERAPIES SERIES
Discharge: HOME | End: 2022-01-13
Payer: MEDICAID

## 2022-01-13 PROCEDURE — 97110 THERAPEUTIC EXERCISES: CPT

## 2022-01-13 PROCEDURE — 97161 PT EVAL LOW COMPLEX 20 MIN: CPT

## 2022-01-13 NOTE — PROGRESS NOTES
surgery. Pt fell off step ladder last year, injuring both ankles. Pt had tarsal tunnel release 11/19/21. Pt needs to have another surgery on left ankle but insurance denied until she completes therapy. SUBJECTIVE: Pt was released to start walking about a week ago. Pt has ankle sleeve she has worn when she walks but causes ankle to swell significantly when she takes it off, so doesn't wear it. Pt hasn't tried calf stretch against wall, attempted heel raises but unable, so has been focusing on toe curls. Pain present when she is on her feet, standing, walking, cleaning, etc. Pt c/o constant ache in ankle. Pt takes ibuprofen and Tylenol as needed but no longer taking opiod. Pt continues to ice. Social/Functional History and Current Status:  Medications and Allergies have been reviewed and are listed on Medical History Questionnaire. Mini Lyoa lives alone in a single story home with a level surface to enter. Pt currently staying with friend that has 4 steps to enter. Initially patient used roll-about, but no AD now. Pt plans to return to her home soon.      Task Previous Current   ADLs  Independent Independent   IADL's Independent Modified Independent- takes extra time and requires breaks d/t pain/soreness   Ambulation Independent Independent   Transfers Independent Modified Independent   Recreation Independent Dependent/Unable   Community Integration Independent Independent   Driving Active  Active    Work Unemployed  Unemployed       Objective:    GENERAL   Pain 5/10 left medial ankle, around incision   Observation 2 medial left ankle incisions, 1 right medial ankle incision   Palpation Tender left medial ankle and foot, achilles tendon, plantar great toe   Sensation Numbness left toes, tingling right toes   Edema L ankle swelling with activity, mild swelling today   Accessory Motion        LOWER EXTREMITY RANGE OF MOTION    Left Right Comments   Knee Flexion      Knee Extension      Knee Range of Motion is [facility]  [x]      Ankle Plantarflexion 42 44    Ankle Dorsiflexion -10 -2    Ankle Inversion 16 22 L 18 deg passively   Ankle Eversion 7 10    Toe Flexion Limited 25% WFL    Toe Extension      Ankle Range of Motion is [facility]  []        Toe Range of Motion is [facility]  []       LOWER EXTREMITY STRENGTH    Left Right Comments   Ankle DF 4 3-    Ankle PF 4+ 3-    Ankle Inversion 3 3-    Ankle Eversion 3 3-    Toe Flexion      Toe Extension      LE strength is WFL  []              GAIT ANALYSIS: without shoes, slow pace, good heel/toe pattern, antalgic pattern    BALANCE/PROPRIOCEPTION          Single leg stance                                          Left Right Pain/Comments   Eyes open                                   unable         FUNCTIONAL TESTS    Pain No Pain Comments   Stair navigation   nonreciprocally   Squat      Single Leg Hop      Double Leg Heel Raise   unable       TREATMENT   Precautions:    Pain: 5/10 left medial ankle    X in shaded column indicates activity completed today   Modalities Parameters/  Location  Notes                     Manual Therapy Time/Technique  Notes                     Exercise/Intervention   Notes   Left ankle ROM: pf/df, inv/ev, circles CW/CCW 10  x    Left ankle alphabet 1x  x    Left ankle df towel stretch- 3 way 3x30 sec  x    Toe curls 10  x    Great toe extension passive stretch by pt 5x10 sec                                                  Specific Interventions Next Treatment: Bike/Nustep, calf/ankle stretches, ankle and toe ROM and strengthening, balance training, gait and stair training    Activity/Treatment Tolerance:  [x]  Patient tolerated treatment well  []  Patient limited by fatigue  []  Patient limited by pain   []  Patient limited by medical complications  []  Other:     Assessment: 54year old presents with post-op left ankle pain and swelling that occur with increased activity.  Pt demos decreased left ankle ROM and strength, affecting balance, gait and stair negotiation. Pt prefers to wear boots as they provide more support than tennis shoes. Pt will benefit from skilled PT to address listed impairments to reduce pain and improve mobility. Body Structures/Functions/Activity Limitations: impaired activity tolerance, impaired balance, impaired ROM, impaired strength, pain and abnormal gait  Prognosis: good    GOALS:  Patient Goal: Strengthen left ankle, prepare for another surgery    Short Term Goals: defer to LTGs    Long Term Goals: 8 weeks  Patient will improve left ankle AROM df from -10 to -2 degrees, inversion from 16 to 20 degrees and eversion from 7 to 10 degrees for ease walking and climbing stairs. Patient will improve left ankle strength to 4-/5 for ease negotiating stairs reciprocally with 1 handrail. Patient will perform left single limb stance x5 seconds to tolerate walking on uneven terrain. Patient will ambulate without antalgia >30 minutes in grocery store, with less than 3/10 pain. Patient will be independent and compliant with HEP daily to achieve above goals. Patient Education:   [x]  HEP/Education Completed: Plan of Care, Goals, attendance policy, exercises listed above with handout provided   [employer] Access Code:  []  No new Education completed  []  Reviewed Prior HEP      [x]  Patient verbalized and/or demonstrated understanding of education provided. []  Patient unable to verbalize and/or demonstrate understanding of education provided. Will continue education. []  Barriers to learning:     PLAN:  Treatment Recommendations: Strengthening, Range of Motion, Balance Training, Functional Mobility Training, Gait Training, Stair Training, Manual Therapy - Soft Tissue Mobilization, Home Exercise Program, Patient Education, Aquatics and Modalities    [x]  Plan of care initiated. Plan to see patient 2 times per week for 8 weeks to address the treatment planned outlined above.   []  Continue with current plan of care  []  Modify plan of care as follows:    []  Hold pending physician visit  []  Discharge    Time In 1310   Time Out 1400   Timed Code Minutes: 15 min   Total Treatment Time: 50 min     Electronically Signed by: Bj Rodriguez PT

## 2022-01-18 ENCOUNTER — APPOINTMENT (OUTPATIENT)
Dept: PHYSICAL THERAPY | Age: 52
End: 2022-01-18
Payer: MEDICAID

## 2022-01-20 ENCOUNTER — HOSPITAL ENCOUNTER (OUTPATIENT)
Dept: PHYSICAL THERAPY | Age: 52
Setting detail: THERAPIES SERIES
End: 2022-01-20
Payer: MEDICAID

## 2022-01-27 ENCOUNTER — HOSPITAL ENCOUNTER (OUTPATIENT)
Dept: PHYSICAL THERAPY | Age: 52
Setting detail: THERAPIES SERIES
Discharge: HOME | End: 2022-01-27
Payer: MEDICAID

## 2022-01-27 PROCEDURE — 97530 THERAPEUTIC ACTIVITIES: CPT

## 2022-01-27 PROCEDURE — 97140 MANUAL THERAPY 1/> REGIONS: CPT

## 2022-01-27 PROCEDURE — 97110 THERAPEUTIC EXERCISES: CPT

## 2022-01-27 NOTE — PROGRESS NOTES
[address] [facility]  PHYSICAL THERAPY  [] EVALUATION  [x] DAILY NOTE (LAND) [] DAILY NOTE (AQUATIC ) [] PROGRESS NOTE [] DISCHARGE NOTE    [] [address] [facility]   [] David [address]    [] [address] [facility]   [x] [facility]  [facility]  Date: 2022  Patient Name:  Yady Hamilton  : 1970  MRN: [id number]  CSN: [id number]    Referring Practitioner ELIDA Wyatt*   Diagnosis TARSAL TUNNEL PLANTAR FASCITIS left   Treatment Diagnosis Post-op left ankle pain, decreased left ankle ROM and strength, difficulty walking   Date of Evaluation 22    Additional Pertinent History Arthritis, right ankle surgery , bariatric sleeve 3 years ago. Functional Outcome Measure Used FAAM   Functional Outcome Score  (22)       Insurance: Primary: Payor: Renea Nissen /  /  / ,   Secondary:    Authorization Information: INSURANCE THERAPY BENEFIT: No precert until after 05IA visit. Visit Limit Based on Precert  AQUATIC THERAPY COVERED:   Yes  MODALITIES COVERED:  Yes except for Iontophoresis and Hot/Cold Packs. TELEHEALTH COVERED: Yes   Visit # 3, 3/10 for progress note   Visits Allowed: 30   Recertification Date: 3/89/47   Physician Follow-Up: 22   Physician Orders: 2x/wk, 10-12 visits   History of Present Illness: Pt s/p left ankle surgery. Pt fell off step ladder last year, injuring both ankles. Pt had tarsal tunnel release 21. Pt needs to have another surgery on left ankle but insurance denied until she completes therapy. SUBJECTIVE: Pt reports pain increases with walking even short distances.       Objective:        TREATMENT   Precautions:    Pain: 310 left medial ankle    X in shaded column indicates activity completed today   Modalities Parameters/  Location  Notes                     Manual Therapy Time/Technique  Notes   STM L foot ankle (at end) x10 min x To reduce swelling/pain Exercise/Intervention   Notes   NuStep x5 min  x Load 5, seat 4, arms 9   ABCs L ankle x1  x    Left ankle ROM: pf/df, inv/ev, circles CW/CCW x10      Pressing foot (df/pf) into blue foam x10 ea  x seated   Seated rocker board fwd, lat  x10 ea  x    Seated BAPS CW/CCW X 10 ea      Left ankle df stretch- 3 way 3x30 sec  x With strap   Toe curls 10  x    Great toe extension passive stretch by pt 5x10 sec  x                                                Specific Interventions Next Treatment: Bike/Nustep, calf/ankle stretches, ankle and toe ROM and strengthening, balance training, gait and stair training    Activity/Treatment Tolerance:  [x]  Patient tolerated treatment well  []  Patient limited by fatigue  []  Patient limited by pain   []  Patient limited by medical complications  []  Other:     Assessment:Initiated Nustep, increased seated there ex and added STM for swelling. Reports decreased pain after STM. Body Structures/Functions/Activity Limitations: impaired activity tolerance, impaired balance, impaired ROM, impaired strength, pain and abnormal gait  Prognosis: good    GOALS:  Patient Goal: Strengthen left ankle, prepare for another surgery    Short Term Goals: defer to LTGs    Long Term Goals: 8 weeks  Patient will improve left ankle AROM df from -10 to -2 degrees, inversion from 16 to 20 degrees and eversion from 7 to 10 degrees for ease walking and climbing stairs. Patient will improve left ankle strength to 4-/5 for ease negotiating stairs reciprocally with 1 handrail. Patient will perform left single limb stance x5 seconds to tolerate walking on uneven terrain. Patient will ambulate without antalgia >30 minutes in grocery store, with less than 3/10 pain. Patient will be independent and compliant with HEP daily to achieve above goals.            Patient Education:   [x]  HEP/Education Completed: Plan of Care, Goals, attendance policy, exercises listed above with handout provided  Mike Jackman Access Code:  []  No new Education completed  []  Reviewed Prior HEP      [x]  Patient verbalized and/or demonstrated understanding of education provided. []  Patient unable to verbalize and/or demonstrate understanding of education provided. Will continue education. []  Barriers to learning:     PLAN:  Treatment Recommendations: Strengthening, Range of Motion, Balance Training, Functional Mobility Training, Gait Training, Stair Training, Manual Therapy - Soft Tissue Mobilization, Home Exercise Program, Patient Education, Aquatics and Modalities    [x]  Plan of care initiated. Plan to see patient 2 times per week for 8 weeks to address the treatment planned outlined above.   []  Continue with current plan of care  []  Modify plan of care as follows:    []  Hold pending physician visit  []  Discharge    Time In 1250   Time Out 1335   Timed Code Minutes: 45 min   Total Treatment Time: 45 min     Electronically Signed by: Michael [id number]

## 2022-02-01 ENCOUNTER — HOSPITAL ENCOUNTER (OUTPATIENT)
Dept: PHYSICAL THERAPY | Age: 52
Setting detail: THERAPIES SERIES
Discharge: HOME | End: 2022-02-01
Payer: MEDICAID

## 2022-02-01 PROCEDURE — 97140 MANUAL THERAPY 1/> REGIONS: CPT

## 2022-02-01 PROCEDURE — 97530 THERAPEUTIC ACTIVITIES: CPT

## 2022-02-01 PROCEDURE — 97110 THERAPEUTIC EXERCISES: CPT

## 2022-02-01 NOTE — PROGRESS NOTES
[address] [facility]  PHYSICAL THERAPY  [] EVALUATION  [x] DAILY NOTE (LAND) [] DAILY NOTE (AQUATIC ) [] PROGRESS NOTE [] DISCHARGE NOTE    [] [address] [facility]   [] David Rajeev    [] [facility]   [x] [facility]  [facility]  Date: 2022  Patient Name:  Kendra Evans  : 1970  MRN: [id number]  CSN: [id number]    Referring Practitioner Lion Portillo., D*   Diagnosis TARSAL TUNNEL PLANTAR FASCITIS left   Treatment Diagnosis Post-op left ankle pain, decreased left ankle ROM and strength, difficulty walking   Date of Evaluation 22    Additional Pertinent History Arthritis, right ankle surgery , bariatric sleeve 3 years ago. Functional Outcome Measure Used FAAM   Functional Outcome Score  (22)       Insurance: Primary: Payor: Rosanne Mendez /  /  / ,   Secondary:    Authorization Information: INSURANCE THERAPY BENEFIT: No precert until after 97YX visit. Visit Limit Based on Precert  AQUATIC THERAPY COVERED:   Yes  MODALITIES COVERED:  Yes except for Iontophoresis and Hot/Cold Packs. TELEHEALTH COVERED: Yes   Visit # 4, 4/10 for progress note   Visits Allowed: 30   Recertification Date:    Physician Follow-Up: 22   Physician Orders: 2x/wk, 10-12 visits   History of Present Illness: Pt s/p left ankle surgery. Pt fell off step ladder last year, injuring both ankles. Pt had tarsal tunnel release 21. Pt needs to have another surgery on left ankle but insurance denied until she completes therapy. SUBJECTIVE: Pt reports pain increases with walking even short distances. Objective    Reports increased foot pain today due to increased time up on her feet yesterday.     TREATMENT   Precautions:    Pain: 4/10 left medial ankle    X in shaded column indicates activity completed today   Modalities Parameters/  Location  Notes                     Manual Therapy Time/Technique  Notes   [facility] Patient called to schedule a phone visit (says that is the only thing he can do), but there is nothing available. Patient states he has a b/p cuff, thermometer and all his meds lined up. Please call. foot ankle (at end) x10 min x To reduce swelling/pain               Exercise/Intervention   Notes   NuStep x5 min  x Load 5, seat 4, arms 9   ABCs L ankle x1  x    Left ankle ROM: pf/df, inv/ev, circles CW/CCW x10  x    Pressing foot (df/pf) into blue foam x10 ea  x seated   Seated rocker board fwd, lat  x10 ea  x    Seated BAPS CW/CCW X 10 ea  x    Left ankle df stretch- 3 way 3x30 sec  x With strap   Toe curls 10  x    Great toe extension passive stretch by pt 5x10 sec  x                                                Specific Interventions Next Treatment: Bike/Nustep, calf/ankle stretches, ankle and toe ROM and strengthening, balance training, gait and stair training    Activity/Treatment Tolerance:  [x]  Patient tolerated treatment well  []  Patient limited by fatigue  []  Patient limited by pain   []  Patient limited by medical complications  []  Other:     Assessment: Reports decreased pain after STM. Body Structures/Functions/Activity Limitations: impaired activity tolerance, impaired balance, impaired ROM, impaired strength, pain and abnormal gait  Prognosis: good    GOALS:  Patient Goal: Strengthen left ankle, prepare for another surgery    Short Term Goals: defer to LTGs    Long Term Goals: 8 weeks  Patient will improve left ankle AROM df from -10 to -2 degrees, inversion from 16 to 20 degrees and eversion from 7 to 10 degrees for ease walking and climbing stairs. Patient will improve left ankle strength to 4-/5 for ease negotiating stairs reciprocally with 1 handrail. Patient will perform left single limb stance x5 seconds to tolerate walking on uneven terrain. Patient will ambulate without antalgia >30 minutes in grocery store, with less than 3/10 pain. Patient will be independent and compliant with HEP daily to achieve above goals.            Patient Education:   [x]  HEP/Education Completed: Plan of Care, Goals, attendance policy, exercises listed above with handout provided   [employer] Code:  []  No new Education completed  []  Reviewed Prior HEP      [x]  Patient verbalized and/or demonstrated understanding of education provided. []  Patient unable to verbalize and/or demonstrate understanding of education provided. Will continue education. []  Barriers to learning:     PLAN:  Treatment Recommendations: Strengthening, Range of Motion, Balance Training, Functional Mobility Training, Gait Training, Stair Training, Manual Therapy - Soft Tissue Mobilization, Home Exercise Program, Patient Education, Aquatics and Modalities    [x]  Plan of care initiated. Plan to see patient 2 times per week for 8 weeks to address the treatment planned outlined above.   []  Continue with current plan of care  []  Modify plan of care as follows:    []  Hold pending physician visit  []  Discharge    Time In 1250   Time Out 1335   Timed Code Minutes: 45 min   Total Treatment Time: 45 min     Electronically Signed by: Amrik Mendes [id number]

## 2022-02-03 ENCOUNTER — APPOINTMENT (OUTPATIENT)
Dept: PHYSICAL THERAPY | Age: 52
End: 2022-02-03
Payer: MEDICAID

## 2022-02-08 ENCOUNTER — HOSPITAL ENCOUNTER (OUTPATIENT)
Dept: PHYSICAL THERAPY | Age: 52
Setting detail: THERAPIES SERIES
Discharge: HOME | End: 2022-02-08
Payer: MEDICAID

## 2022-02-08 PROCEDURE — 97140 MANUAL THERAPY 1/> REGIONS: CPT

## 2022-02-08 PROCEDURE — 97530 THERAPEUTIC ACTIVITIES: CPT

## 2022-02-08 PROCEDURE — 97110 THERAPEUTIC EXERCISES: CPT

## 2022-02-08 NOTE — PROGRESS NOTES
[address] [facility]  PHYSICAL THERAPY  [] EVALUATION  [x] DAILY NOTE (LAND) [] DAILY NOTE (AQUATIC ) [] PROGRESS NOTE [] DISCHARGE NOTE    [] [facility]   [] David [address]    [] [address] [employer]   [x] [facility] [employer]  [facility]x  Date: 2022  Patient Name:  Alejandro Hernandez  : 1970  MRN: [id number]  CSN: [id number]    Referring Practitioner ELIDA Villanueva*   Diagnosis TARSAL TUNNEL PLANTAR FASCITIS left   Treatment Diagnosis Post-op left ankle pain, decreased left ankle ROM and strength, difficulty walking   Date of Evaluation 22    Additional Pertinent History Arthritis, right ankle surgery , bariatric sleeve 3 years ago. Functional Outcome Measure Used FAAM   Functional Outcome Score / (22)       Insurance: Primary: Payor: Sadie Castaneda /  /  / ,   Secondary:    Authorization Information: INSURANCE THERAPY BENEFIT: No precert until after 21WY visit. Visit Limit Based on Precert  AQUATIC THERAPY COVERED:   Yes  MODALITIES COVERED:  Yes except for Iontophoresis and Hot/Cold Packs. TELEHEALTH COVERED: Yes   Visit # 5, 5/10 for progress note   Visits Allowed: 30   Recertification Date: 07   Physician Follow-Up: 22   Physician Orders: 2x/wk, 10-12 visits   History of Present Illness: Pt s/p left ankle surgery. Pt fell off step ladder last year, injuring both ankles. Pt had tarsal tunnel release 21. Pt needs to have another surgery on left ankle but insurance denied until she completes therapy. SUBJECTIVE: Pt reports pain has decreased overall.     Objective    TREATMENT   Precautions:    Pain: 2/10 left medial ankle    X in shaded column indicates activity completed today   Modalities Parameters/  Location  Notes                     Manual Therapy Time/Technique  Notes   STM L foot ankle (at end) x10 min x To reduce swelling/pain               Exercise/Intervention   Notes   NuStep x6 min  x Load 5, seat 4, arms 9   ABCs L ankle x1  x    Left ankle ROM: pf/df, inv/ev, circles CW/CCW x10  x    Pressing foot (df/pf) into blue foam x10 ea  x seated   Seated rocker board fwd, lat  x10 ea  x    Seated BAPS CW/CCW X 10 ea  x    Left ankle df stretch- 3 way 3x30 sec  x With strap   Toe curls 10  x    Great toe extension passive stretch by pt 5x10 sec  x                                                Specific Interventions Next Treatment: Bike/Nustep, calf/ankle stretches, ankle and toe ROM and strengthening, balance training, gait and stair training    Activity/Treatment Tolerance:  [x]  Patient tolerated treatment well  []  Patient limited by fatigue  []  Patient limited by pain   []  Patient limited by medical complications  []  Other:     Assessment: Reports decreased pain after STM. Body Structures/Functions/Activity Limitations: impaired activity tolerance, impaired balance, impaired ROM, impaired strength, pain and abnormal gait  Prognosis: good    GOALS:  Patient Goal: Strengthen left ankle, prepare for another surgery    Short Term Goals: defer to LTGs    Long Term Goals: 8 weeks  Patient will improve left ankle AROM df from -10 to -2 degrees, inversion from 16 to 20 degrees and eversion from 7 to 10 degrees for ease walking and climbing stairs. Patient will improve left ankle strength to 4-/5 for ease negotiating stairs reciprocally with 1 handrail. Patient will perform left single limb stance x5 seconds to tolerate walking on uneven terrain. Patient will ambulate without antalgia >30 minutes in grocery store, with less than 3/10 pain. Patient will be independent and compliant with HEP daily to achieve above goals.            Patient Education:   [x]  HEP/Education Completed: Plan of Care, Goals, attendance policy, exercises listed above with handout provided   [employer] Access Code:  []  No new Education completed  []  Reviewed Prior HEP      [x]  Patient verbalized and/or demonstrated understanding of education provided. []  Patient unable to verbalize and/or demonstrate understanding of education provided. Will continue education. []  Barriers to learning:     PLAN:  Treatment Recommendations: Strengthening, Range of Motion, Balance Training, Functional Mobility Training, Gait Training, Stair Training, Manual Therapy - Soft Tissue Mobilization, Home Exercise Program, Patient Education, Aquatics and Modalities    [x]  Plan of care initiated. Plan to see patient 2 times per week for 8 weeks to address the treatment planned outlined above.   []  Continue with current plan of care  []  Modify plan of care as follows:    []  Hold pending physician visit  []  Discharge    Time In 1000   Time Out 1040   Timed Code Minutes: 40 min   Total Treatment Time: 40 min     Electronically Signed by: Cephus Holter [id number]

## 2022-02-10 ENCOUNTER — HOSPITAL ENCOUNTER (OUTPATIENT)
Dept: PHYSICAL THERAPY | Age: 52
Setting detail: THERAPIES SERIES
Discharge: HOME | End: 2022-02-10
Payer: MEDICAID

## 2022-02-10 PROCEDURE — 97530 THERAPEUTIC ACTIVITIES: CPT

## 2022-02-10 PROCEDURE — 97140 MANUAL THERAPY 1/> REGIONS: CPT

## 2022-02-10 NOTE — PROGRESS NOTES
[facility]  PHYSICAL THERAPY  [] EVALUATION  [x] DAILY NOTE (LAND) [] DAILY NOTE (AQUATIC ) [] PROGRESS NOTE [] DISCHARGE NOTE    [] [facility]   [] David Boo    [] [address] [facility]   [x] [facility]  2x  Date: 2/10/2022  Patient Name:  Kendra Evans  : 1970  MRN: [id number]  CSN: [id number]    Referring Practitioner Lion Portillo., D*   Diagnosis TARSAL TUNNEL PLANTAR FASCITIS left   Treatment Diagnosis Post-op left ankle pain, decreased left ankle ROM and strength, difficulty walking   Date of Evaluation 22    Additional Pertinent History Arthritis, right ankle surgery , bariatric sleeve 3 years ago. Functional Outcome Measure Used FAAM   Functional Outcome Score  (22)       Insurance: Primary: Payor: Rosanne Mendez /  /  / ,   Secondary:    Authorization Information: INSURANCE THERAPY BENEFIT: No precert until after 57YA visit. Visit Limit Based on Precert  AQUATIC THERAPY COVERED:   Yes  MODALITIES COVERED:  Yes except for Iontophoresis and Hot/Cold Packs. TELEHEALTH COVERED: Yes   Visit # 6, 610 for progress note   Visits Allowed: 30   Recertification Date:    Physician Follow-Up: 22   Physician Orders: 2x/wk, 10-12 visits   History of Present Illness: Pt s/p left ankle surgery. Pt fell off step ladder last year, injuring both ankles. Pt had tarsal tunnel release 21. Pt needs to have another surgery on left ankle but insurance denied until she completes therapy. SUBJECTIVE: Pt reports pain has decreased overall.     Objective    TREATMENT   Precautions:    Pain: 1/10 left medial ankle    X in shaded column indicates activity completed today   Modalities Parameters/  Location  Notes                     Manual Therapy Time/Technique  Notes   STM L foot ankle (at end) x10 min x To reduce swelling/pain               Exercise/Intervention   Notes   NuStep x6 min  x Load 5, seat 4, arms 9   ABCs L ankle x1  x    Left ankle ROM: pf/df, inv/ev, circles CW/CCW x10  x    Airex: heel/toe raises, inv/ev x10 ea  x standing    rocker board fwd, lat and 10 sec balance x10 ea  x standing    BAPS CW/CCW X 10 ea  x standing   Left ankle df stretch- 3 way 3x30 sec  x With strap   Toe curls 10  x    Great toe extension passive stretch by pt 5x10 sec  x                                                Specific Interventions Next Treatment: Bike/Nustep, calf/ankle stretches, ankle and toe ROM and strengthening, balance training, gait and stair training    Activity/Treatment Tolerance:  [x]  Patient tolerated treatment well  []  Patient limited by fatigue  []  Patient limited by pain   []  Patient limited by medical complications  []  Other:     Assessment: Advanced to standing this session. .  Body Structures/Functions/Activity Limitations: impaired activity tolerance, impaired balance, impaired ROM, impaired strength, pain and abnormal gait  Prognosis: good    GOALS:  Patient Goal: Strengthen left ankle, prepare for another surgery    Short Term Goals: defer to LTGs    Long Term Goals: 8 weeks  Patient will improve left ankle AROM df from -10 to -2 degrees, inversion from 16 to 20 degrees and eversion from 7 to 10 degrees for ease walking and climbing stairs. Patient will improve left ankle strength to 4-/5 for ease negotiating stairs reciprocally with 1 handrail. Patient will perform left single limb stance x5 seconds to tolerate walking on uneven terrain. Patient will ambulate without antalgia >30 minutes in grocery store, with less than 3/10 pain. Patient will be independent and compliant with HEP daily to achieve above goals.            Patient Education:   [x]  HEP/Education Completed: Plan of Care, Goals, attendance policy, exercises listed above with handout provided   [employer] Access Code:  []  No new Education completed  []  Reviewed Prior HEP      [x]  Patient verbalized and/or demonstrated understanding of education provided. []  Patient unable to verbalize and/or demonstrate understanding of education provided. Will continue education. []  Barriers to learning:     PLAN:  Treatment Recommendations: Strengthening, Range of Motion, Balance Training, Functional Mobility Training, Gait Training, Stair Training, Manual Therapy - Soft Tissue Mobilization, Home Exercise Program, Patient Education, Aquatics and Modalities    [x]  Plan of care initiated. Plan to see patient 2 times per week for 8 weeks to address the treatment planned outlined above.   []  Continue with current plan of care  []  Modify plan of care as follows:    []  Hold pending physician visit  []  Discharge    Time In 1255   Time Out 1325   Timed Code Minutes: 30 min   Total Treatment Time: 30 min     Electronically Signed by: Amrik Mendes [id number]

## 2022-02-15 ENCOUNTER — HOSPITAL ENCOUNTER (OUTPATIENT)
Dept: PHYSICAL THERAPY | Age: 52
Setting detail: THERAPIES SERIES
Discharge: HOME | End: 2022-02-15
Payer: MEDICAID

## 2022-02-15 PROCEDURE — 97530 THERAPEUTIC ACTIVITIES: CPT

## 2022-02-15 PROCEDURE — 97110 THERAPEUTIC EXERCISES: CPT

## 2022-02-15 NOTE — PROGRESS NOTES
[address] [facility]  PHYSICAL THERAPY  [] EVALUATION  [x] DAILY NOTE (LAND) [] DAILY NOTE (AQUATIC ) [] PROGRESS NOTE [] DISCHARGE NOTE    [] [facility]   [] David Boo    [] [address] [facility]   [x] [facility]  2x  Date: 2/15/2022  Patient Name:  Stephen Aldridge  : 1970  MRN: [id number]  CSN: [id number]    Referring Practitioner ELIDA Mason*   Diagnosis TARSAL TUNNEL PLANTAR FASCITIS left   Treatment Diagnosis Post-op left ankle pain, decreased left ankle ROM and strength, difficulty walking   Date of Evaluation 22    Additional Pertinent History Arthritis, right ankle surgery , bariatric sleeve 3 years ago. Functional Outcome Measure Used FAAM   Functional Outcome Score / (22)       Insurance: Primary: Payor: Ignacia Saucedo /  /  / ,   Secondary:    Authorization Information: INSURANCE THERAPY BENEFIT: No precert until after 11UE visit. Visit Limit Based on Precert  AQUATIC THERAPY COVERED:   Yes  MODALITIES COVERED:  Yes except for Iontophoresis and Hot/Cold Packs. TELEHEALTH COVERED: Yes   Visit # 7, 7/10 for progress note   Visits Allowed: 30   Recertification Date:    Physician Follow-Up: 22   Physician Orders: 2x/wk, 10-12 visits   History of Present Illness: Pt s/p left ankle surgery. Pt fell off step ladder last year, injuring both ankles. Pt had tarsal tunnel release 21. Pt needs to have another surgery on left ankle but insurance denied until she completes therapy. SUBJECTIVE: Pt reports pain has decreased overall.  States she is ready for more standing activities    Objective    TREATMENT   Precautions:    Pain: 1/10 left medial ankle    X in shaded column indicates activity completed today   Modalities Parameters/  Location  Notes                     Manual Therapy Time/Technique  Notes   STM L foot ankle (at end) x10 min  To reduce swelling/pain Exercise/Intervention   Notes   NuStep x6 min  x Load 5, seat 4, arms 9   ABCs L ankle x1  x    Left ankle ROM: pf/df, inv/ev, circles CW/CCW x10      Airex: heel/toe raises, inv/ev, marching, mini squats x10 ea  x standing    rocker board fwd, lat and 10 sec balance x15 ea  x standing    BAPS CW/CCW x10 ea  x standing   Tandem stepping fwd, retro, sidestepping L/R x2 laps  x    Tandem stance, SLS x10 sec ea  x    Step ups fwd, lat x10 ea  x           Left ankle df stretch- 3 way 3x30 sec  x With strap   Toe curls 10      Great toe extension passive stretch by pt 5x10 sec                                                  Specific Interventions Next Treatment: Bike/Nustep, calf/ankle stretches, ankle and toe ROM and strengthening, balance training, gait and stair training    Activity/Treatment Tolerance:  [x]  Patient tolerated treatment well  []  Patient limited by fatigue  []  Patient limited by pain   []  Patient limited by medical complications  []  Other:     Assessment: Increased standing therex this session. Body Structures/Functions/Activity Limitations: impaired activity tolerance, impaired balance, impaired ROM, impaired strength, pain and abnormal gait  Prognosis: good    GOALS:  Patient Goal: Strengthen left ankle, prepare for another surgery    Short Term Goals: defer to LTGs    Long Term Goals: 8 weeks  Patient will improve left ankle AROM df from -10 to -2 degrees, inversion from 16 to 20 degrees and eversion from 7 to 10 degrees for ease walking and climbing stairs. Patient will improve left ankle strength to 4-/5 for ease negotiating stairs reciprocally with 1 handrail. Patient will perform left single limb stance x5 seconds to tolerate walking on uneven terrain. Patient will ambulate without antalgia >30 minutes in grocery store, with less than 3/10 pain. Patient will be independent and compliant with HEP daily to achieve above goals.            Patient Education:   [x] HEP/Education Completed: Plan of Care, Goals, attendance policy, exercises listed above with handout provided   [employer] Access Code:  []  No new Education completed  []  Reviewed Prior HEP      [x]  Patient verbalized and/or demonstrated understanding of education provided. []  Patient unable to verbalize and/or demonstrate understanding of education provided. Will continue education. []  Barriers to learning:     PLAN:  Treatment Recommendations: Strengthening, Range of Motion, Balance Training, Functional Mobility Training, Gait Training, Stair Training, Manual Therapy - Soft Tissue Mobilization, Home Exercise Program, Patient Education, Aquatics and Modalities    [x]  Plan of care initiated. Plan to see patient 2 times per week for 8 weeks to address the treatment planned outlined above.   []  Continue with current plan of care  []  Modify plan of care as follows:    []  Hold pending physician visit  []  Discharge    Time In 1255   Time Out 1340   Timed Code Minutes: 45 min   Total Treatment Time: 45 min     Electronically Signed by: Sumit Topete [id number]

## 2022-02-16 ENCOUNTER — INITIAL CONSULT (OUTPATIENT)
Dept: BARIATRICS/WEIGHT MGMT | Age: 52
End: 2022-02-16
Payer: MEDICAID

## 2022-02-16 VITALS
HEIGHT: 60 IN | DIASTOLIC BLOOD PRESSURE: 82 MMHG | WEIGHT: 170 LBS | SYSTOLIC BLOOD PRESSURE: 112 MMHG | RESPIRATION RATE: 18 BRPM | BODY MASS INDEX: 33.38 KG/M2 | HEART RATE: 66 BPM

## 2022-02-16 DIAGNOSIS — M25.572 CHRONIC PAIN OF LEFT ANKLE: ICD-10-CM

## 2022-02-16 DIAGNOSIS — Z98.84 STATUS POST LAPAROSCOPIC SLEEVE GASTRECTOMY: ICD-10-CM

## 2022-02-16 DIAGNOSIS — E78.5 HYPERLIPIDEMIA, UNSPECIFIED HYPERLIPIDEMIA TYPE: ICD-10-CM

## 2022-02-16 DIAGNOSIS — G89.29 CHRONIC PAIN OF LEFT ANKLE: ICD-10-CM

## 2022-02-16 PROCEDURE — 3017F COLORECTAL CA SCREEN DOC REV: CPT | Performed by: PHYSICIAN ASSISTANT

## 2022-02-16 PROCEDURE — G8484 FLU IMMUNIZE NO ADMIN: HCPCS | Performed by: PHYSICIAN ASSISTANT

## 2022-02-16 PROCEDURE — G8427 DOCREV CUR MEDS BY ELIG CLIN: HCPCS | Performed by: PHYSICIAN ASSISTANT

## 2022-02-16 PROCEDURE — 99213 OFFICE O/P EST LOW 20 MIN: CPT | Performed by: PHYSICIAN ASSISTANT

## 2022-02-16 PROCEDURE — G8417 CALC BMI ABV UP PARAM F/U: HCPCS | Performed by: PHYSICIAN ASSISTANT

## 2022-02-16 PROCEDURE — 1036F TOBACCO NON-USER: CPT | Performed by: PHYSICIAN ASSISTANT

## 2022-02-16 RX ORDER — EZETIMIBE 10 MG/1
1 TABLET ORAL DAILY
COMMUNITY
Start: 2022-01-11

## 2022-02-16 NOTE — PROGRESS NOTES
[facility]  [address]  [place].Rizwan [place]      Visit Date:  2/16/2022  Weight Management Postop Follow-up    HPI:      Shahrzad Velez is a 50 y.o. female who is here today to discuss Contrave. Had to stop medication as she had left ankle surgery with Dr. Clark [facility] on 11/19/21. She restarted medication a few days ago- currently taking 1 pill daily. She is now scheduled for another surgery the first week of April and will be non-weight bearing again for another 2 months. Discussed Contrave and will need to stop again prior to upcoming surgery. Plans to just stop Contrave now and discuss resuming again after recovered from upcoming surgery. She is also 2.5 years post-op  robotic-assisted sleeve gastrectomy performed by Dr. Claudetta Citizen  on 8/12/19. Weight today 170#. Up 1# since last visit. Maintained 23# weight loss from Contrave. Down 97# since surgery. BMI 33 . Reports that she has been doing well staying on track with nutrition. Activity has been limited and was non-weight bearing for several weeks post-op. Currently in PT twice weekly. Has also been walking for 15 minutes daily. Has recently added arm weights. Typically working out 1 hour daily. Drinking plenty of fluids. Getting in plenty of protein. Drinking protein shakes every other day. No carbonation. No sweets. Tolerating post-op diet. No problems with bowel movements. No nausea. No emesis. No GERD/ Reflux. Denies CP/SOB. No Dizziness. No abdominal pain. Taking and tolerating all vitamins- CelebrateONE 45/ Calcium daily. Recently started Zetia for high cholesterol. Current BMI: Body mass index is 33.2 kg/m².   Current Weight:   Wt Readings from Last 3 Encounters:   02/16/22 170 lb (77.1 kg)   11/08/21 169 lb (76.7 kg)   10/11/21 172 lb 6.4 oz (78.2 kg)     Pre-op Body Weight:267  Lowest weight post-op: 161  Prior to starting Contrave: 193      Past Medical History:  Past Medical History:   Diagnosis Date    Arthritis     Hyperlipidemia        Past Surgical History:  Past Surgical History:   Procedure Laterality Date    ANKLE SURGERY Left 2006    ANKLE SURGERY Right 2019    plate and screws    ANUS SURGERY N/A 2021    ANAL EXAM UNDER ANESTHESIA, LEFT MEDIAL BUTTOCK CYST EXCISION, AND ANAL FISTULOTOMY performed by Dieter Angel MD at [facility] 25 Bilateral 2020    BILATERAL BREAST MAMMOPLASTY REDUCTION performed by Rosa Elena Alexander MD at [place] [address], TOTAL ABDOMINAL      HYSTEROPLASTY      LIPECTOMY N/A 2020    PANNICULECTOMY performed by Rosa Elena Alexander MD at [address] [place] [address] N/A 2019    ROBOTIC SLEEVE GASTRECTOMY performed by [facility]eter Angel MD at [facility] N/A 2019    EGD WITH BX performed by Anna Su MD at [address] [place] [address] Right     plate and screws       Past Social History:  Social History     Socioeconomic History    Marital status:      Spouse name: Not on file    Number of children: Not on file    Years of education: Not on file    Highest education level: Not on file   Occupational History    Not on file   Tobacco Use    Smoking status: Former Smoker     Packs/day: 1.00     Types: Cigarettes     Quit date: 2004     Years since quittin.0    Smokeless tobacco: Never Used   Vaping Use    Vaping Use: Never used   Substance and Sexual Activity    Alcohol use: Yes     Comment: rare    Drug use: No    Sexual activity: Not on file   Other Topics Concern    Not on file   Social History Narrative    Not on file     Social Determinants of Health     Financial Resource Strain:     Difficulty of Paying Living Expenses: Not on file   Food Insecurity:     Worried About [address] in the Last Year: Not on file    [facility] in the Last Year: Not on file   Transportation Needs:     Lack of Transportation (Medical): Not on file    Lack of Transportation (Non-Medical): Not on file   Physical Activity:     Days of Exercise per Week: Not on file    Minutes of Exercise per Session: Not on file   Stress:     Feeling of Stress : Not on file   Social Connections:     Frequency of Communication with Friends and Family: Not on file    Frequency of Social Gatherings with Friends and Family: Not on file    Attends Baptism Services: Not on file    Active Member of [address] [place] [address] [employer] or Organizations: Not on file    Attends Club or Organization Meetings: Not on file    Marital Status: Not on file   Intimate Partner Violence:     Fear of Current or Ex-Partner: Not on file    Emotionally Abused: Not on file    Physically Abused: Not on file    Sexually Abused: Not on file   Housing Stability:     Unable to Pay for Housing in the Last Year: Not on file    Number of [place] in the Last Year: Not on file    Unstable Housing in the Last Year: Not on file        Medications:   Current Outpatient Medications   Medication Sig Dispense Refill    ezetimibe (ZETIA) 10 MG tablet Take 1 tablet by mouth daily      naltrexone-buPROPion (CONTRAVE) 8-90 MG per extended release tablet Take 2 tablets by mouth 2 times daily 120 tablet 0    Biotin w/ Vitamins C & E (HAIR/SKIN/NAILS PO) Take by mouth      Calcium Citrate-Vitamin D (CALCIUM CITRATE CHEWY BITE PO) Take 1 Dose by mouth daily      acyclovir (ZOVIRAX) 800 MG tablet TAKE 1 TABLET BY MOUTH ONCE DAILY      Apple Cider Vinegar 300 MG TABS CIDER VINEGAR Apple Cider Vinegar 300 MG Oral Tablet 02/17/2021 Provider: 02-  [facility] 87 ([id number])      Cranberry 200 MG CAPS Take 15,000 mg by mouth       Multiple Vitamins-Minerals (THERAPEUTIC MULTIVITAMIN-MINERALS) tablet Take 1 tablet by mouth daily CELEBRATE ONE 45       No current facility-administered medications for this visit. Allergies: Allergies   Allergen Reactions    Celebrex [Celecoxib] Other (See Comments)     Patient states double vision    Toradol [Ketorolac Tromethamine]      Not sure of reaction    Morphine Rash       Subjective:    Review of Systems:  Constitutional: Denies any fever, chills, fatigue. Wound: Denies any rash, skin color changes or wound problems. Resp: Denies any cough, shortness of breath. CV: Denies any chest pain, orthopnea or syncope. MS: Denies myalgias, (+)arthralgias. GI: Denies any nausea, vomiting, diarrhea, constipation, melena, hematochezia. No incisional discomfort. : Denies any hematuria, hesitancy or dysuria. NEURO: Denies seizures, headache. Objective:    /82 (Site: Right Upper Arm, Position: Sitting, Cuff Size: Large Adult)   Pulse 66   Resp 18   Ht 5' (1.524 m)   Wt 170 lb (77.1 kg)   BMI 33.20 kg/m²   Physical Examination:   Constitutional: Alert and oriented to person, place and time. Well-developed, well- nourished. Head: Normocephalic and atraumatic  Neck: Supple. Eyes: EOMI b/l. Conjunctivae normal.  No scleral icterus. Respiratory: Effort normal. No respiratory distress. Abd: Benign  Ext:  Movement x 4. No edema  Skin; Warm and dry, no visible rashes, lesions or ulcers.    Neuro: Cranial Nerves Grossly Intact; nml coordination      Labs:  CBC   Lab Results   Component Value Date    WBC 5.7 06/01/2021    RBC 4.21 06/01/2021    HGB 12.5 06/01/2021    HCT 40.0 06/01/2021    MCV 95.0 06/01/2021    MCH 29.7 06/01/2021    MCHC 31.3 06/01/2021    RDW 14.9 11/29/2020     06/01/2021    MPV 9.8 06/01/2021    SEGSPCT 40.1 06/01/2021    LABLYMP 45.9 06/01/2021    MONOPCT 9.5 06/01/2021    MONOPCT 6.1 08/28/2020    LABEOS 3.2 06/01/2021    BASO 1.1 06/01/2021    NRBC 0 06/01/2021    SEGSABS 2.3 06/01/2021    LYMPHSABS 2.6 06/01/2021    MONOSABS 0.5 06/01/2021    EOSABS 0.2 06/01/2021    BASOSABS 0.1 06/01/2021        BMP/CMP   Lab Results   Component Value Date    GLUCOSE 93 06/01/2021    GLUCOSE 88 08/28/2020    CREATININE 0.5 06/01/2021    BUN 16 06/01/2021     06/01/2021    K 4.1 06/01/2021    K 3.7 11/30/2020     06/01/2021    CO2 27 06/01/2021    CALCIUM 9.6 06/01/2021    AST 16 06/01/2021    ALKPHOS 96 06/01/2021    PROT 7.2 06/01/2021    LABALBU 4.3 06/01/2021    BILITOT 0.4 06/01/2021    ALT 12 06/01/2021        PREALBUMIN   Lab Results   Component Value Date    PREALBUMIN 26.2 06/01/2021        VITAMIN B12   Lab Results   Component Value Date    [id number] 7093 06/01/2021        24 HOUR URINE CALCIUM   No results found for: Vi Padilla CALCIUMUR     VITAMIN D   Lab Results   Component Value Date    VITD25 37 06/01/2021        VITAMIN B1/ THIAMINE   Lab Results   Component Value Date    ORFI6KFQOHM 130 06/01/2021        RBC FOLATE   Lab Results   Component Value Date    FOLATE 16.4 06/01/2021        LIPID SCREEN (FASTING)   Lab Results   Component Value Date    CHOL 293 06/01/2021    TRIG 269 06/01/2021    HDL 57 06/01/2021    LDLCALC 182 06/01/2021   ,     HGA1C (T2DM ONLY)   Lab Results   Component Value Date    LABA1C 5.0 06/01/2021    AVGG 90 06/01/2021        TSH   Lab Results   Component Value Date    TSH 3.540 06/01/2021        IRON   Lab Results   Component Value Date    IRON 77 06/01/2021        IONIZED CALCIUM   No results found for: MACKENZIE DALLAS      Assessment:       Diagnosis Orders   1. BMI 33.0-33.9,adult     2. Status post laparoscopic sleeve gastrectomy     3. Hyperlipidemia, unspecified hyperlipidemia type     4. Chronic pain of left ankle       Plan:    Stay well hydrated. Drink a minimum of 64 oz of non-carbonated, non-caffeinated fluids daily. Nutritional education occurred during visit. Tolerating diet. Continue following with dietitian and follow their recommendations as directed. Continue  60-80 grams of protein each day.     Signs and symptoms reviewed with patient that would be concerning and need her to return to office for re-evaluation. Patient will call if any questions or concerns arrise. Importance of physical activity discussed with patient. Continue dedicated physical activity and strength training, as able. Continue taking Multivitamin and Calcium   Encouraged to attend support groups  Ankle surgery will be scheduled in April with Dr. Lacey Farfan. Will stop Contrave. Will follow up in June to discuss resuming Contrave. Return in about 4 months (around 6/16/2022) for postop follow up. I spent over 20 minutes with the patient, with greater that 50% of that time spent on face counseling for nutrition and exercise.     Electronically signed by TAINA Sorto on 2/16/2022 at 9:55 AM

## 2022-02-16 NOTE — PATIENT INSTRUCTIONS
Stay well hydrated. Drink a minimum of 64 oz of non-carbonated, non-caffeinated fluids daily. Nutritional education occurred during visit. Tolerating diet. Continue following with dietitian and follow their recommendations as directed. Continue  60-80 grams of protein each day. Signs and symptoms reviewed with patient that would be concerning and need her to return to office for re-evaluation. Patient will call if any questions or concerns arrise. Importance of physical activity discussed with patient. Continue dedicated physical activity and strength training, as able. Continue taking Multivitamin and Calcium   Encouraged to attend support groups  Ankle surgery will be scheduled in April with Dr. Marah Marroquin. Will stop Contrave. Will follow up in June to discuss resuming Contrave.

## 2022-02-17 ENCOUNTER — HOSPITAL ENCOUNTER (OUTPATIENT)
Dept: PHYSICAL THERAPY | Age: 52
Setting detail: THERAPIES SERIES
Discharge: HOME | End: 2022-02-17
Payer: MEDICAID

## 2022-02-17 PROCEDURE — 97140 MANUAL THERAPY 1/> REGIONS: CPT

## 2022-02-17 PROCEDURE — 97035 APP MDLTY 1+ULTRASOUND EA 15: CPT

## 2022-02-17 NOTE — PROGRESS NOTES
[place]  PHYSICAL THERAPY  [] EVALUATION  [x] DAILY NOTE (LAND) [] DAILY NOTE (AQUATIC ) [] PROGRESS NOTE [] DISCHARGE NOTE    [] [facility]   [] David [address]    [] [address] [employer]   [x] [facility]  [facility]x  Date: 2022  Patient Name:  Cynthia Galvan  : 1970  MRN: [id number]  CSN: [id number]    Referring Practitioner ELIDA Infante*   Diagnosis TARSAL TUNNEL PLANTAR FASCITIS left   Treatment Diagnosis Post-op left ankle pain, decreased left ankle ROM and strength, difficulty walking   Date of Evaluation 22    Additional Pertinent History Arthritis, right ankle surgery , bariatric sleeve 3 years ago. Functional Outcome Measure Used FAAM   Functional Outcome Score  (22)       Insurance: Primary: Payor: Gerri Wasserman /  /  / ,   Secondary:    Authorization Information: INSURANCE THERAPY BENEFIT: No precert until after 85HG visit. Visit Limit Based on Precert  AQUATIC THERAPY COVERED:   Yes  MODALITIES COVERED:  Yes except for Iontophoresis and Hot/Cold Packs. TELEHEALTH COVERED: Yes   Visit # 8, 810 for progress note   Visits Allowed: 30   Recertification Date:    Physician Follow-Up: 22   Physician Orders: 2x/wk, 10-12 visits   History of Present Illness: Pt s/p left ankle surgery. Pt fell off step ladder last year, injuring both ankles. Pt had tarsal tunnel release 21. Pt needs to have another surgery on left ankle but insurance denied until she completes therapy. SUBJECTIVE: Increase in pain today; really worked on her HEP and she may have overdone it.      Objective    TREATMENT   Precautions:    Pain: 6/10 left medial ankle    X in shaded column indicates activity completed today   Modalities Parameters/  Location  Notes   US: 1.2 W/cm2 pulsed at 50% of 1MHz 10 min x Left medial ankle/calf               Manual Therapy Time/Technique  Notes   STM L reciprocally with 1 handrail. Patient will perform left single limb stance x5 seconds to tolerate walking on uneven terrain. Patient will ambulate without antalgia >30 minutes in grocery store, with less than 3/10 pain. Patient will be independent and compliant with HEP daily to achieve above goals. Patient Education:   [x]  HEP/Education Completed: Plan of Care, Goals, attendance policy, exercises listed above with handout provided   React[employer] Access Code:  []  No new Education completed  []  Reviewed Prior HEP      [x]  Patient verbalized and/or demonstrated understanding of education provided. []  Patient unable to verbalize and/or demonstrate understanding of education provided. Will continue education. []  Barriers to learning:     PLAN:  Treatment Recommendations: Strengthening, Range of Motion, Balance Training, Functional Mobility Training, Gait Training, Stair Training, Manual Therapy - Soft Tissue Mobilization, Home Exercise Program, Patient Education, Aquatics and Modalities    [x]  Plan of care initiated. Plan to see patient 2 times per week for 8 weeks to address the treatment planned outlined above.   []  Continue with current plan of care  []  Modify plan of care as follows:    []  Hold pending physician visit  []  Discharge    Time In 1303   Time Out 1343   Timed Code Minutes: 40 min   Total Treatment Time: 40 min     Electronically Signed by: DILIA Velazquez [id number]"Kash Shay DPT  [id number]

## 2022-02-22 ENCOUNTER — HOSPITAL ENCOUNTER (OUTPATIENT)
Dept: PHYSICAL THERAPY | Age: 52
Setting detail: THERAPIES SERIES
Discharge: HOME | End: 2022-02-22
Payer: MEDICAID

## 2022-02-22 PROCEDURE — 97140 MANUAL THERAPY 1/> REGIONS: CPT

## 2022-02-22 PROCEDURE — 97530 THERAPEUTIC ACTIVITIES: CPT

## 2022-02-22 NOTE — DISCHARGE SUMMARY
[address] [facility]  PHYSICAL THERAPY  [] EVALUATION  [] DAILY NOTE (LAND) [] DAILY NOTE (AQUATIC ) [] PROGRESS NOTE [x] DISCHARGE NOTE    [] OUTPATIENT REHABILITATION CENTER [facility]   [] David     [] [address] [facility]   [x] Cyndie Sebastian    Date: 2022  Patient Name:  Shahrzad Velez  : 1970  MRN: [id number]  CSN: [id number]    Referring Practitioner ELIDA Saunders*   Diagnosis No admission diagnoses are documented for this encounter. left   Treatment Diagnosis Post-op left ankle pain, decreased left ankle ROM and strength, difficulty walking   Date of Evaluation 22    Additional Pertinent History Arthritis, right ankle surgery , bariatric sleeve 3 years ago. Functional Outcome Measure Used FAAM   Functional Outcome Score 26 (22)   72/84 (22)      Insurance: Primary: Payor: Halie Alaniz /  /  / ,   Secondary:    Authorization Information: INSURANCE THERAPY BENEFIT: No precert until after 14JV visit. Visit Limit Based on Precert  AQUATIC THERAPY COVERED:   Yes  MODALITIES COVERED:  Yes except for Iontophoresis and Hot/Cold Packs. TELEHEALTH COVERED: Yes   Visit # 8, 8/10 for progress note   Visits Allowed: 30   Recertification Date:    Physician Follow-Up: 22   Physician Orders: 2x/wk, 10-12 visits   History of Present Illness: Pt s/p left ankle surgery. Pt fell off step ladder last year, injuring both ankles. Pt had tarsal tunnel release 21. Pt needs to have another surgery on left ankle but insurance denied until she completes therapy. SUBJECTIVE: really worked on standing exercises; avoided over doing it.      Objective    TREATMENT   Precautions:    Pain: 1-2/10 left medial ankle    X in shaded column indicates activity completed today   Modalities Parameters/  Location  Notes   US: 1.2 W/cm2 pulsed at 50% of 1MHz 10 min x Left medial ankle/calf               Manual Therapy Time/Technique  Notes   STM L foot ankle (at end) x20 min x To reduce swelling/pain   PROM/stretch: DF/calcaneal distraction; stretch into end range inversion/eversion  x10 min x          Exercise/Intervention   Notes   NuStep x6 min   Load 5, seat 4, arms 9   ABCs L ankle x1      Left ankle ROM: pf/df, inv/ev, circles CW/CCW x10      Airex: heel/toe raises, inv/ev, marching, mini squats x10 ea   standing    rocker board fwd, lat and 10 sec balance x15 ea   standing    BAPS CW/CCW x10 ea   standing   Tandem stepping fwd, retro, sidestepping L/R x2 laps      Tandem stance, SLS x10 sec ea      Step ups fwd, lat x10 ea             Left ankle df stretch- 3 way 3x30 sec   With strap   Toe curls 10      Great toe extension passive stretch by pt 5x10 sec                                         Review of goals; FAAM; balance testing    x      Specific Interventions Next Treatment: Bike/Nustep, calf/ankle stretches, ankle and toe ROM and strengthening, balance training, gait and stair training    Activity/Treatment Tolerance:  [x]  Patient tolerated treatment well  []  Patient limited by fatigue  []  Patient limited by pain   []  Patient limited by medical complications  []  Other:     Assessment: Sarah Milligan is discharged from PT with independent HEP. She has another surgery coming up and would like to save visits. She has met all goals and has little disability associated with her left foot/ankle. Body Structures/Functions/Activity Limitations: impaired activity tolerance, impaired balance, impaired ROM, impaired strength, pain and abnormal gait  Prognosis: good    GOALS:  Patient Goal: Strengthen left ankle, prepare for another surgery    Short Term Goals: defer to LTGs    Long Term Goals: 8 weeks  Patient will improve left ankle AROM df from -10 to -2 degrees, inversion from 16 to 20 degrees and eversion from 7 to 10 degrees for ease walking and climbing stairs.  PARTIALLY MET-limited more by ROM limitations in her RIGHT foot  Patient will improve left ankle strength to 4-/5 for ease negotiating stairs reciprocally with 1 handrail. GOAL MET-only uses handrail if ground is wet/icy  Patient will perform left single limb stance x5 seconds to tolerate walking on uneven terrain. GOAL MET-able to hold single leg balance for 30 sec  Patient will ambulate without antalgia >30 minutes in grocery store, with less than 3/10 pain. NOT MET-would not walk for 30 min straight at this point  Patient will be independent and compliant with HEP daily to achieve above goals. ONGOING          Patient Education:   [x]  HEP/Education Completed: Plan of Care, Goals, attendance policy, exercises listed above with handout provided   [email or web address] Access Code:  []  No new Education completed  []  Reviewed Prior HEP      [x]  Patient verbalized and/or demonstrated understanding of education provided. []  Patient unable to verbalize and/or demonstrate understanding of education provided. Will continue education. []  Barriers to learning:     PLAN:  Treatment Recommendations: Strengthening, Range of Motion, Balance Training, Functional Mobility Training, Gait Training, Stair Training, Manual Therapy - Soft Tissue Mobilization, Home Exercise Program, Patient Education, Aquatics and Modalities    []  Plan of care initiated. Plan to see patient 2 times per week for 8 weeks to address the treatment planned outlined above.   []  Continue with current plan of care  []  Modify plan of care as follows:    []  Hold pending physician visit  [x]  Discharge    Time In 1300   Time Out 1340   Timed Code Minutes: 40 min   Total Treatment Time: 40 min     Electronically Signed by: Gabe Rojas, DILIA Taveras [id number]" Sandi Magana, DPT  [id number]

## 2022-06-15 ENCOUNTER — HOSPITAL ENCOUNTER (OUTPATIENT)
Dept: PHYSICAL THERAPY | Age: 52
Setting detail: THERAPIES SERIES
Discharge: HOME | End: 2022-06-15
Payer: MEDICAID

## 2022-06-15 PROCEDURE — 97162 PT EVAL MOD COMPLEX 30 MIN: CPT

## 2022-06-15 PROCEDURE — 97110 THERAPEUTIC EXERCISES: CPT

## 2022-06-15 NOTE — PROGRESS NOTES
** PLEASE PLEASE SIGN, DATE AND TIME CERTIFICATION BELOW AND RETURN TO [facility] OUTPATIENT REHABILITATION (FAX #: [phone]). ATTEST/CO-SIGN IF ACCESSING VIA [employer]. THANK YOU.**    I certify that I have examined the patient below and determined that Physical Medicine and Rehabilitation service is necessary and that I approve the established plan of care for up to 90 days or as specifically noted. Attestation, signature or co-signature of physician indicates approval of certification requirements.    ________________________ ____________ __________  Physician Signature   Date   Time  [address] [facility]  PHYSICAL THERAPY  [x] EVALUATION  [] DAILY NOTE (LAND) [] DAILY NOTE (AQUATIC ) [] PROGRESS NOTE [] DISCHARGE NOTE    [] [facility]   [] [place] [address]    [] [address] [facility]   [x] Preet Esteves    Date: 6/15/2022  Patient Name:  Madan Moya  : 1970  MRN: [id number]  CSN: [id number]    Referring Practitioner TAINA Ramos   Diagnosis Plantar fascial fibromatosis [M72.2]    Treatment Diagnosis Left foot pain, tightness    Date of Evaluation 6/15/22    Additional Pertinent History B previous ankle surgeries,        Functional Outcome Measure Used LEFS   Functional Outcome Score 63 (6/15/22)       Insurance: Primary: Payor: Etta Fish /  /  / ,   Secondary:    Authorization Information: None    Visit # 1, 1/10 for progress note   Visits Allowed:     Recertification Date: 4 weeks, 7/15   Physician Follow-Up: 22    Physician Orders: eval and treat    History of Present Illness: Left ankle PF release  2022,   Was in cast an     SUBJECTIVE: nto sure if she needs PT for left foot. She has been moving well and has minimal pain . Social/Functional History and Current Status:  Medications and Allergies have been reviewed and are listed on Medical History Questionnaire.     Madan Moya lives with freinds  in a multiple floor home with stairs and a handrail to enter. Task Previous Current   ADLs  Independent Independent   IADL's Independent Independent   Ambulation Independent Independent   Transfers Independent Independent   Recreation Independent Independent   Community Integration Not Applicable Not Applicable   Driving Active  Active    Work Unemployed  Unemployed  Plans to take care of aunt in August   Has dementia        Objective:   Wearing tennis shoes with orthotics. OBJECTIVE:    Pain: Mild pull medial sole of foot with standing. 0n the left           Observation Decreased arch B,  Mild bunion B . Palpation Tenderness and mild increased tissue medial PF on the left. Pain under 1st digit MTP joint on the right. Range of Motion   Dorsiflex  R  12 L 12  Plantarflex  R  45 L 45  Inversion R  5 L  45  Eversion R  5 L  17     Strength  R 5/5  L 4+/5  Able to walk on toes. Special Tests          GAIT ANALYSIS: avoids roll to medial foot with push off on the right due to pain in the 1st MTP joint on the right . Mild hip B     BALANCE/PROPRIOCEPTION          Single leg stance                                          right left Pain/Comments   Eyes open                                      3  Sec        20 Sec            TREATMENT   Precautions:    Pain:     \"X in shaded column indicates activity completed today    *\" next to exercise/intervention indicates progression   Modalities Parameters/  Location  Notes                     Manual Therapy Time/Technique  Notes                     Exercise/Intervention   Notes          Towel stretches  15 sec  x 3   x    t band   4 wayd  [place]  15  x    Towel schrunches   1 min   x           Standing runner stretch and soleus  15 x 3  x                         HEP-- see below    x             Specific Interventions Next Treatment:HEP only unless she has other issues. Will hold chart for 2 weeks.      Activity/Treatment Tolerance:  []  Patient tolerated treatment well  []  Patient limited by fatigue  []  Patient limited by pain   []  Patient limited by medical complications  []  Other:     Assessment: minimal dysfunction in the left foot,  Mild tightness in the medial PF . Generalized hypermobility    Body Structures/Functions/Activity Limitations: edema, impaired balance, impaired ROM, impaired strength and abnormal gait  Prognosis: good    GOALS:  Patient Goal: walk more without foot pain         LTG and STG  4 weeks     I HEP            Patient Education:    [x]  HEP/Education Completed: Plan of Care, Goals, Access Code: [email or web address]   URL: [employer]/   Date: 06/15/2022   Prepared by: Yuli Obando      Exercises   Long Sitting Ankle Eversion with Resistance - 1 x daily - 7 x weekly - 10 reps - 3 sets   Ankle Dorsiflexion with Resistance - 1 x daily - 7 x weekly - 10 reps - 3 sets   Long Sitting Ankle Inversion with Resistance - 1 x daily - 7 x weekly - 10 reps - 3 sets   Ankle Plantar Flexion with Resistance - 1 x daily - 7 x weekly - 10 reps - 3 sets   Seated Ankle Inversion with Resistance and Legs Crossed - 1 x daily - 7 x weekly - 10 reps - 3 sets   Seated Ankle Eversion with Resistance - 1 x daily - 7 x weekly - 10 reps - 3 sets   Seated Ankle Dorsiflexion with Anchored Resistance - 1 x daily - 7 x weekly - 10 reps - 3 sets   Seated Eccentric Ankle Plantar Flexion with Resistance - Straight Leg - 1 x daily - 7 x weekly - 10 reps - 3 sets   Heel Toe Raises with Counter Support - 1 x daily - 7 x weekly - 10 reps - 3 sets   Standing Ankle Dorsiflexion Stretch - 2-3 x daily - 7 x weekly - 3 reps - 1 sets - 15 hold   Long Sitting Calf Stretch with Strap - 1 x daily - 7 x weekly - 3 reps - 1 sets - 15 hold   Towel Scrunches - 1 x daily - 7 x weekly - 15 reps - 2 hold   Medbridge Access Code:  []  No new Education completed  []  Reviewed Prior HEP      [x]  Patient verbalized and/or demonstrated understanding of education provided. []  Patient unable to verbalize and/or demonstrate understanding of education provided. Will continue education. []  Barriers to learning:     PLAN:  Treatment Recommendations: Strengthening, Range of Motion and Home Exercise Program    [x]  Plan of care initiated. 1 visit only due to minor foot dysfunction on Left    Will hold chart for 2 weeks-- if no word form patient , will assume she is doing well and no further PT needed.    []  Continue with current plan of care  []  Modify plan of care as follows:    []  Hold pending physician visit  []  Discharge    Time In 0900   Time out  0938   Timed Code Minutes: 10 min   Total Treatment Time: 38  min     Electronically Signed by: Natasha Bruno, PT

## 2022-06-28 ENCOUNTER — OFFICE VISIT (OUTPATIENT)
Dept: BARIATRICS/WEIGHT MGMT | Age: 52
End: 2022-06-28
Payer: MEDICAID

## 2022-06-28 VITALS
HEIGHT: 60 IN | BODY MASS INDEX: 33.22 KG/M2 | TEMPERATURE: 98.9 F | HEART RATE: 84 BPM | DIASTOLIC BLOOD PRESSURE: 86 MMHG | WEIGHT: 169.2 LBS | SYSTOLIC BLOOD PRESSURE: 130 MMHG

## 2022-06-28 DIAGNOSIS — E78.5 HYPERLIPIDEMIA, UNSPECIFIED HYPERLIPIDEMIA TYPE: ICD-10-CM

## 2022-06-28 DIAGNOSIS — F32.A DEPRESSION, UNSPECIFIED DEPRESSION TYPE: ICD-10-CM

## 2022-06-28 DIAGNOSIS — F41.9 ANXIETY: ICD-10-CM

## 2022-06-28 DIAGNOSIS — Z98.84 STATUS POST LAPAROSCOPIC SLEEVE GASTRECTOMY: ICD-10-CM

## 2022-06-28 PROCEDURE — G8427 DOCREV CUR MEDS BY ELIG CLIN: HCPCS | Performed by: PHYSICIAN ASSISTANT

## 2022-06-28 PROCEDURE — 1036F TOBACCO NON-USER: CPT | Performed by: PHYSICIAN ASSISTANT

## 2022-06-28 PROCEDURE — 3017F COLORECTAL CA SCREEN DOC REV: CPT | Performed by: PHYSICIAN ASSISTANT

## 2022-06-28 PROCEDURE — G8417 CALC BMI ABV UP PARAM F/U: HCPCS | Performed by: PHYSICIAN ASSISTANT

## 2022-06-28 PROCEDURE — 99213 OFFICE O/P EST LOW 20 MIN: CPT | Performed by: PHYSICIAN ASSISTANT

## 2022-06-28 RX ORDER — OMEGA-3/DHA/EPA/FISH OIL 300-1000MG
CAPSULE ORAL
COMMUNITY

## 2022-06-28 RX ORDER — NYSTATIN 100000 [USP'U]/G
POWDER TOPICAL
COMMUNITY
Start: 2022-05-24

## 2022-06-28 NOTE — PROGRESS NOTES
[facility]  [id number]  [place]iz[place]      Visit Date:  6/28/2022  Weight Management Postop Follow-up    HPI:      Simran Zarate is a 50 y.o. female who is here today for follow up. Resumed Contrave about a month ago. Tolerating well. No side effects. Currently taking 1 pill daily. Completely recovered from from right ankle surgery. Now planning to move to [place] to take care of her Aunt that has dementia. She is also almost 3 years post-op  robotic-assisted sleeve gastrectomy performed by Dr. Shayla Delacruz  on 8/12/19. Weight today 169#. Down 1# since last apt. Down 98# since bariatric surgery. BMI 33. Drinking plenty of fluids. Getting in plenty of protein. Drinking 1 protein shakes daily. No carbonation. No sweets. Tolerating post-op diet. No problems with bowel movements. No nausea. No emesis. No GERD/ Reflux. Denies CP/SOB. No Dizziness. No abdominal pain. Taking and tolerating all vitamins- Celebrate ONE 45/ Calcium. Continues Zetia for cholesterol. Plans to find a weight management/ PCP in [place]. Advised to continue annual bariatric labs. Current BMI: Body mass index is 33.04 kg/m².   Current Weight:   Wt Readings from Last 3 Encounters:   06/28/22 169 lb 3.2 oz (76.7 kg)   02/16/22 170 lb (77.1 kg)   11/08/21 169 lb (76.7 kg)     Pre-op Body Weight:267  Lowest weight post-op: 161  Prior to starting Contrave: 193      Past Medical History:  Past Medical History:   Diagnosis Date    Arthritis     Hyperlipidemia        Past Surgical History:  Past Surgical History:   Procedure Laterality Date    ANKLE SURGERY Left 2006    ANKLE SURGERY Right 03/2019    plate and screws    ANUS SURGERY N/A 4/29/2021    ANAL EXAM UNDER ANESTHESIA, LEFT MEDIAL BUTTOCK CYST EXCISION, AND ANAL FISTULOTOMY performed by Tammi Salmon MD at [facility] Bilateral 11/9/2020    BILATERAL BREAST MAMMOPLASTY REDUCTION performed by Salud Franklin MD at [place] [address]      HYSTERECTOMY, TOTAL ABDOMINAL (CERVIX REMOVED)      HYSTEROPLASTY      LIPECTOMY N/A 2020    PANNICULECTOMY performed by Salud Franklin MD at [facility] N/A 2019    ROBOTIC SLEEVE GASTRECTOMY performed by Herb Borjas MD at [facility] N/A 2019    EGD WITH BX performed by Cynthia Jennings MD at [address] [place] [address] 2014    plate and screws       Past Social History:  Social History     Socioeconomic History    Marital status:      Spouse name: Not on file    Number of children: Not on file    Years of education: Not on file    Highest education level: Not on file   Occupational History    Not on file   Tobacco Use    Smoking status: Former Smoker     Packs/day: 1.00     Types: Cigarettes     Quit date: 2004     Years since quittin.3    Smokeless tobacco: Never Used   Vaping Use    Vaping Use: Never used   Substance and Sexual Activity    Alcohol use: Yes     Comment: rare    Drug use: No    Sexual activity: Not on file   Other Topics Concern    Not on file   Social History Narrative    Not on file     Social Determinants of Health     Financial Resource Strain:     Difficulty of Paying Living Expenses: Not on file   Food Insecurity:     Worried About [address] in the Last Year: Not on file    [address] [facility] [address] in the Last Year: Not on file   Transportation Needs:     Lack of Transportation (Medical): Not on file    Lack of Transportation (Non-Medical):  Not on file   Physical Activity:     Days of Exercise per Week: Not on file    Minutes of Exercise per Session: Not on file   Stress:     Feeling of Stress : Not on file   Social Connections:     Frequency of Communication with Friends and Family: Not on file    Frequency of Social Gatherings with Friends and Family: Not on file    Attends Confucianist Services: Not on file    Active Member of Clubs or Organizations: Not on file    Attends Club or Organization Meetings: Not on file    Marital Status: Not on file   Intimate Partner Violence:     Fear of Current or Ex-Partner: Not on file    Emotionally Abused: Not on file    Physically Abused: Not on file    Sexually Abused: Not on file   Housing Stability:     Unable to Pay for Housing in the Last Year: Not on file    Number of [place] in the Last Year: Not on file    Unstable Housing in the Last Year: Not on file        Medications:   Current Outpatient Medications   Medication Sig Dispense Refill    NYSTATIN 915696 UNIT/GM powder APPLY POWDER TOPICALLY TWICE DAILY FOR [address] under the tongue      fish oil-omega-3 fatty acids 1000 MG capsule Take by mouth      ezetimibe (ZETIA) 10 MG tablet Take 1 tablet by mouth daily      Biotin w/ Vitamins C & E (HAIR/SKIN/NAILS PO) Take by mouth      Calcium Citrate-Vitamin D (CALCIUM CITRATE CHEWY BITE PO) Take 1 Dose by mouth daily      acyclovir (ZOVIRAX) 800 MG tablet TAKE 1 TABLET BY MOUTH ONCE DAILY      Apple Cider Vinegar 300 MG TABS CIDER VINEGAR Apple Cider Vinegar 300 MG Oral Tablet 02/17/2021 Provider: 02-  [facility] ([id number])      Cranberry 200 MG CAPS Take 15,000 mg by mouth       Multiple Vitamins-Minerals (THERAPEUTIC MULTIVITAMIN-MINERALS) tablet Take 1 tablet by mouth daily CELEBRATE ONE 45      naltrexone-buPROPion (CONTRAVE) 8-90 MG per extended release tablet Take 2 tablets by mouth 2 times daily 120 tablet 0     No current facility-administered medications for this visit. Allergies:    Allergies   Allergen Reactions    Celebrex [Celecoxib] Other (See Comments)     Patient states double vision    Toradol [Ketorolac Tromethamine]      Not sure of reaction    Morphine Rash       Subjective:    Review of Systems:  Constitutional: Denies any fever, chills, fatigue. Wound: Denies any rash, skin color changes or wound problems. Resp: Denies any cough, shortness of breath. CV: Denies any chest pain, orthopnea or syncope. MS: Denies myalgias, (+)arthralgias. GI: Denies any nausea, vomiting, diarrhea, constipation, melena, hematochezia. No incisional discomfort. : Denies any hematuria, hesitancy or dysuria. NEURO: Denies seizures, headache. Objective:    /86 (Site: Right Upper Arm, Position: Sitting, Cuff Size: Large Adult)   Pulse 84   Temp 98.9 °F (37.2 °C) (Oral)   Ht 5' (1.524 m)   Wt 169 lb 3.2 oz (76.7 kg)   BMI 33.04 kg/m²   Physical Examination:   Constitutional: Alert and oriented to person, place and time. Well-developed, well- nourished. Head: Normocephalic and atraumatic  Neck: Supple. Eyes: EOMI b/l. Conjunctivae normal.  No scleral icterus. Respiratory: Effort normal. No respiratory distress. Abd: Benign  Ext:  Movement x 4. No edema  Skin; Warm and dry, no visible rashes, lesions or ulcers.    Neuro: Cranial Nerves Grossly Intact; nml coordination        Labs:  CBC   Lab Results   Component Value Date    WBC 5.7 06/01/2021    RBC 4.21 06/01/2021    HGB 12.5 06/01/2021    HCT 40.0 06/01/2021    MCV 95.0 06/01/2021    MCH 29.7 06/01/2021    MCHC 31.3 06/01/2021    RDW 14.9 11/29/2020     06/01/2021    MPV 9.8 06/01/2021    SEGSPCT 40.1 06/01/2021    LABLYMP 45.9 06/01/2021    MONOPCT 9.5 06/01/2021    MONOPCT 6.1 08/28/2020    LABEOS 3.2 06/01/2021    BASO 1.1 06/01/2021    NRBC 0 06/01/2021    SEGSABS 2.3 06/01/2021    LYMPHSABS 2.6 06/01/2021    MONOSABS 0.5 06/01/2021    EOSABS 0.2 06/01/2021    BASOSABS 0.1 06/01/2021        BMP/CMP   Lab Results   Component Value Date    GLUCOSE 93 06/01/2021    GLUCOSE 88 08/28/2020    CREATININE 0.5 06/01/2021    BUN 16 06/01/2021     06/01/2021    K 4.1 06/01/2021    K 3.7 11/30/2020     06/01/2021    CO2 27 06/01/2021 CALCIUM 9.6 06/01/2021    AST 16 06/01/2021    ALKPHOS 96 06/01/2021    PROT 7.2 06/01/2021    LABALBU 4.3 06/01/2021    BILITOT 0.4 06/01/2021    ALT 12 06/01/2021        PREALBUMIN   Lab Results   Component Value Date    PREALBUMIN 26.2 06/01/2021        VITAMIN B12   Lab Results   Component Value Date    WFHGRGRE33 6479 06/01/2021        24 HOUR URINE CALCIUM   No results found for: Rubye Reddish, CALCIUMUR     VITAMIN D   Lab Results   Component Value Date    VITD25 37 06/01/2021        VITAMIN B1/ THIAMINE   Lab Results   Component Value Date    [id number] 130 06/01/2021        RBC FOLATE   Lab Results   Component Value Date    FOLATE 16.4 06/01/2021        LIPID SCREEN (FASTING)   Lab Results   Component Value Date    CHOL 293 06/01/2021    TRIG 269 06/01/2021    HDL 57 06/01/2021    LDLCALC 182 06/01/2021   ,     HGA1C (T2DM ONLY)   Lab Results   Component Value Date    LABA1C 5.0 06/01/2021    AVGG 90 06/01/2021        TSH   Lab Results   Component Value Date    TSH 3.540 06/01/2021        IRON   Lab Results   Component Value Date    IRON 77 06/01/2021        IONIZED CALCIUM   No results found for: MACKENZIE DALLAS      Assessment:       Diagnosis Orders   1. BMI 33.0-33.9,adult     2. Status post laparoscopic sleeve gastrectomy     3. Hyperlipidemia, unspecified hyperlipidemia type     4. Depression, unspecified depression type     5. Anxiety       Plan:    Stay well hydrated. Drink a minimum of 64 oz of non-carbonated, non-caffeinated fluids daily. Nutritional education occurred during visit. Tolerating diet. Continue following with dietitian and follow their recommendations as directed. Continue  60-80 grams of protein each day. Signs and symptoms reviewed with patient that would be concerning and need her to return to office for re-evaluation. Patient will call if any questions or concerns arrise. Importance of physical activity discussed with patient.    Increase physical activity as tolerated  Increase strength training, as tolerated  Continue taking Multivitamin and Calcium, as directed  Encouraged to attend support groups  Continue Contrave- does not need RF today  Moving to [place]- will obtain family doctor/ weight management center to continue annual labs/ follow up. Return if symptoms worsen or fail to improve, for Follow up. I spent over 20 minutes with the patient, with greater that 50% of that time spent on face counseling for nutrition and exercise.     Electronically signed by TAINA Garcia on 6/28/2022 at 10:43 AM

## 2022-06-28 NOTE — PATIENT INSTRUCTIONS
Stay well hydrated. Drink a minimum of 64 oz of non-carbonated, non-caffeinated fluids daily. Nutritional education occurred during visit. Tolerating diet. Continue following with dietitian and follow their recommendations as directed. Continue  60-80 grams of protein each day. Signs and symptoms reviewed with patient that would be concerning and need her to return to office for re-evaluation. Patient will call if any questions or concerns arrise. Importance of physical activity discussed with patient. Increase physical activity as tolerated  Increase strength training, as tolerated  Continue taking Multivitamin and Calcium, as directed  Encouraged to attend support groups  Moving to [place]- will obtain family doctor/ weight management center to continue annual labs/ follow up.

## 2022-07-13 NOTE — DISCHARGE SUMMARY
** PLEASE PLEASE SIGN, DATE AND TIME CERTIFICATION BELOW AND RETURN TO [facility] OUTPATIENT REHABILITATION (FAX #: [phone]). ATTEST/CO-SIGN IF ACCESSING VIA ININNOBI. THANK YOU.**    I certify that I have examined the patient below and determined that Physical Medicine and Rehabilitation service is necessary and that I approve the established plan of care for up to 90 days or as specifically noted. Attestation, signature or co-signature of physician indicates approval of certification requirements.    ________________________ ____________ __________  Physician Signature   Date   Time  [facility]  PHYSICAL THERAPY  [x] EVALUATION  [] DAILY NOTE (LAND) [] DAILY NOTE (AQUATIC ) [] PROGRESS NOTE [] DISCHARGE NOTE    [] [facility]   [] [facility]    [] [address] [employer]   [x] Mar Hy    Date: 2022  Patient Name:  Chares Dubin  : 1970  MRN: [id number]  CSN: [id number]    Referring Practitioner TAINA Olson   Diagnosis Plantar fascial fibromatosis [M72.2]    Treatment Diagnosis Left foot pain, tightness    Date of Evaluation 6/15/22    Additional Pertinent History B previous ankle surgeries,        Functional Outcome Measure Used LEFS   Functional Outcome Score 63 (6/15/22)       Insurance: Primary: Payor: [employer] /  /  / ,   Secondary:    Authorization Information: None    Visit # 1, 1/10 for progress note   Visits Allowed: 30    Recertification Date: 4 weeks, 7/15   Physician Follow-Up: 22    Physician Orders: eval and treat    History of Present Illness: Left ankle PF release  2022,   Was in cast an     SUBJECTIVE: nto sure if she needs PT for left foot. She has been moving well and has minimal pain . Social/Functional History and Current Status:  Medications and Allergies have been reviewed and are listed on Medical History Questionnaire.     Chares Dubin lives with freinds  in a multiple floor home with stairs and a handrail to enter. Task Previous Current   ADLs  Independent Independent   IADL's Independent Independent   Ambulation Independent Independent   Transfers Independent Independent   Recreation Independent Independent   Community Integration Not Applicable Not Applicable   Driving Active  Active    Work Unemployed  Unemployed  Plans to take care of aunt in August   Has dementia        Objective:   Wearing tennis shoes with orthotics. OBJECTIVE:    Pain: Mild pull medial sole of foot with standing. 0n the left           Observation Decreased arch B,  Mild bunion B . Palpation Tenderness and mild increased tissue medial PF on the left. Pain under 1st digit MTP joint on the right. Range of Motion   Dorsiflex  R  12 L 12  Plantarflex  R  45 L 45  Inversion R  5 L  45  Eversion R  5 L  17     Strength  R 5/5  L 4+/5  Able to walk on toes. Special Tests          GAIT ANALYSIS: avoids roll to medial foot with push off on the right due to pain in the 1st MTP joint on the right . Mild hip B     BALANCE/PROPRIOCEPTION          Single leg stance                                          right left Pain/Comments   Eyes open                                      3  Sec        20 Sec            TREATMENT   Precautions:    Pain:     \"X in shaded column indicates activity completed today    *\" next to exercise/intervention indicates progression   Modalities Parameters/  Location  Notes                     Manual Therapy Time/Technique  Notes                     Exercise/Intervention   Notes          Towel stretches  15 sec  x 3   x    t band   4 wayd  [place]  15  x    Towel schrunches   1 min   x           Standing runner stretch and soleus  15 x 3  x                         HEP-- see below    x             Specific Interventions Next Treatment:HEP only unless she has other issues. Will hold chart for 2 weeks.      Activity/Treatment Tolerance:  []  Patient tolerated treatment well  []  Patient limited by fatigue  []  Patient limited by pain   []  Patient limited by medical complications  []  Other:     Assessment: minimal dysfunction in the left foot,  Mild tightness in the medial PF . Generalized hypermobility    Body Structures/Functions/Activity Limitations: edema, impaired balance, impaired ROM, impaired strength and abnormal gait  Prognosis: good    GOALS:  Patient Goal: walk more without foot pain         LTG and STG  4 weeks     I HEP            Patient Education:    [x]  HEP/Education Completed: Plan of Care, Goals, Access Code: [email or web address]   URL: [employer] [facility]/   Date: 06/15/2022   Prepared by: Melissa Richmond      Exercises   Long Sitting Ankle Eversion with Resistance - 1 x daily - 7 x weekly - 10 reps - 3 sets   Ankle Dorsiflexion with Resistance - 1 x daily - 7 x weekly - 10 reps - 3 sets   Long Sitting Ankle Inversion with Resistance - 1 x daily - 7 x weekly - 10 reps - 3 sets   Ankle Plantar Flexion with Resistance - 1 x daily - 7 x weekly - 10 reps - 3 sets   Seated Ankle Inversion with Resistance and Legs Crossed - 1 x daily - 7 x weekly - 10 reps - 3 sets   Seated Ankle Eversion with Resistance - 1 x daily - 7 x weekly - 10 reps - 3 sets   Seated Ankle Dorsiflexion with Anchored Resistance - 1 x daily - 7 x weekly - 10 reps - 3 sets   Seated Eccentric Ankle Plantar Flexion with Resistance - Straight Leg - 1 x daily - 7 x weekly - 10 reps - 3 sets   Heel Toe Raises with Counter Support - 1 x daily - 7 x weekly - 10 reps - 3 sets   Standing Ankle Dorsiflexion Stretch - 2-3 x daily - 7 x weekly - 3 reps - 1 sets - 15 hold   Long Sitting Calf Stretch with Strap - 1 x daily - 7 x weekly - 3 reps - 1 sets - 15 hold   Towel Scrunches - 1 x daily - 7 x weekly - 15 reps - 2 hold   [employer] Access Code:  []  No new Education completed  []  Reviewed Prior HEP      [x]  Patient verbalized and/or demonstrated understanding of education provided. []  Patient unable to verbalize and/or demonstrate understanding of education provided. Will continue education. []  Barriers to learning:     PLAN:  Treatment Recommendations: Strengthening, Range of Motion and Home Exercise Program    [x]  Plan of care initiated. 1 visit only due to minor foot dysfunction on Left    Will hold chart for 2 weeks-- if no word form patient , will assume she is doing well and no further PT needed. []  Continue with current plan of care  []  Modify plan of care as follows:    []  Hold pending physician visit  []  Discharge    Time In 0900   Time out  0938   Timed Code Minutes: 10 min   Total Treatment Time: 38  min     Electronically Signed by: Leyda Hopkins PT  Only seen 1 visit for HEP  No contact from patient  Will discharge.  7-13-22

## 2023-10-21 NOTE — PROGRESS NOTES
[address]     Time In: 9581  Time Out: 7920  Minutes: 39  Timed Code Treatment Minutes: 39 Minutes     Date: 2019  Patient Name: Gretchen Payne,  Gender:  female        CSN: [id number]   : 1970  (54 y.o.)       Referring Practitioner: Sherryl [email or web address]      Diagnosis: Plantar fasciitis, right, achilles tendon  Treatment Diagnosis: chronic right foot/ankle pain, decreased right ankle AROM, right ankle weakness, difficulty ambulating   Additional Pertinent Hx: PMH: Obesity. L ankle surgery in . General:  PT Visit Information  Onset Date: 19  PT [facility] Information[facility]- allowed 30 visits, precert required after 21CB visit; aquatics and modalities covered except ionto, HP/CP  Total # of Visits Approved: 30  Total # of Visits to Date: 7  Plan of Care/Certification Expiration Date: 19  Progress Note Counter: 7/10 for PN. Subjective:  Chart Reviewed: Yes  Comments: Follow up with Dr. Parag Medina 19. Subjective: Pt reports she tends to walk with walker without boot in the home. Pt feels when wearing boot it causes more pain. Pt reports she cannot submerge in water for 6 weeks post-op. Pt denies current pain. Pain:  Patient Currently in Pain: Denies         Objective         Exercises  Exercise 1: 3 way ankle df towel stretch x30 sec hold each. (NT>4 way ankle peach tband right x15 each)  Exercise 3: Astym treatment to R lower leg x15 min- soft tissue restriction noted in lateral ankle, ankle mortise, achilles tendon, plantar fascia. Had pt left sidelying instead of prone d/t abdominal incision.    Exercise 5: Standing weight shifts lateral and step stance (R foot forward) x2 min  Exercise 6: Seated wooden BAPS circles CW/CCW x15 each  Exercise 7: Standing gastroc stretch x30 sec hold  Exercise 8: Standing heel raises x10, R CC 3 way hip x10 oral

## 2024-01-30 ENCOUNTER — TELEPHONE (OUTPATIENT)
Dept: BARIATRICS/WEIGHT MGMT | Age: 54
End: 2024-01-30

## 2024-05-10 NOTE — PLAN OF CARE
Problem: Falls - Risk of:  Goal: Will remain free from falls  Description  Will remain free from falls  Outcome: Ongoing  Note:   Patient remained free of falls. Call light within reach and used appropriately. Bed alarmed and in lowest position, side rails up x2, and personal items within reach. Patient uses uses roll-a-bout scooter for RLE and tennis shoe on left foot with on stand by assist for ambulating. Problem: Pain Control  Goal: Maintain pain level at or below patient's acceptable level (or 5 if patient is unable to determine acceptable level)  Outcome: Ongoing  Flowsheets (Taken 8/13/2019 5980)  Patient's Stated Pain Goal: 6  Note:   Patient rates pain 6/10 with ambulation and 2/10 with no movement, with a pain goal of 4/10. Pain controlled with medication and repositioning. Patient satisfied. Problem: Neurological  Goal: Maximum potential motor/sensory/cognitive function  Outcome: Ongoing  Note:   Patient is alert and oriented x4. All extremities have pulses of +1 or +2. Denies any numbness and tingling. Right lower extremity limited movement due to prior surgery to ankle but all other extremities active. Problem: Cardiovascular  Goal: No DVT, peripheral vascular complications  Outcome: Ongoing  Note:   No signs and symptoms of DVT. Calves soft and nontender. Pt compliant with Lovenox to help with prevention of DVT. Problem: Respiratory  Goal: No pulmonary complications  Outcome: Ongoing  Note:   Lung sounds clear and diminished and chest expansion symmetrical. O2 sats are 92% or greater on room air. Problem: GI  Goal: No bowel complications  Outcome: Ongoing  Note:   Patient denies any n/v/d. Bowel sounds hypoactive x4 quadrants. No bowel movement yet this shift. Passing flatus. Problem:   Goal: Adequate urinary output  Outcome: Ongoing  Note:   Patient has adequate amounts of clear, yellow urine. Denies any dysuria or urgency.       Problem: Nutrition  Goal: Optimal nutrition therapy  Outcome: Ongoing  Note:   Patient NPO except allowed ice chips and sips of water with meds tolerating well. Denies any n/v.     Problem: Skin Integrity/Risk  Goal: No skin breakdown during hospitalization  Outcome: Ongoing  Note:   No signs of new skin breakdown with each assessment. Skin remains warm, dry, intact. Mucous membranes pink & moist. Patient understands the importance of frequent repositioning in order to prevent any skin breakdown. 6 surgical stab sites in abdomen. Problem: Musculor/Skeletal Functional Status  Goal: Highest potential functional level  Outcome: Ongoing  Note:   Right lower extremity limited movement due to prior surgery to ankle but all other extremities active. Problem: Pain:  Goal: Pain level will decrease  Description  Pain level will decrease  Outcome: Ongoing  Note:   Patient rates pain 6/10 with ambulation and 2/10 with no movement, with a pain goal of 4/10. Pain controlled with medication and repositioning. Patient satisfied. Problem: Discharge Planning:  Goal: Discharged to appropriate level of care  Description  Discharged to appropriate level of care  Outcome: Ongoing  Note:   Patient plans home at discharge. Care manager and social working helping with discharge needs. Problem: Daily Care:  Goal: Daily care needs are met  Description  Daily care needs are met  Outcome: Ongoing  Note:   Patients care was met this shift during hourly rounding and patient using call light appropriately. Care plan reviewed with patient. Patient verbalize understanding of the plan of care and contribute to goal setting. no

## (undated) DEVICE — GOWN,SIRUS,NONRNF,SETINSLV,XL,20/CS: Brand: MEDLINE

## (undated) DEVICE — GOWN,SIRUS,NON REINFRCD,LARGE,SET IN SL: Brand: MEDLINE

## (undated) DEVICE — LINER SUCT CANSTR 1500CC SEMI RIG W/ POR HYDROPHOBIC SHUT

## (undated) DEVICE — SPONGE LAP W18XL18IN WHT COT 4 PLY FLD STRUNG RADPQ DISP ST

## (undated) DEVICE — DECANTER FLD 9IN ST BG FOR ASEP TRNSF OF FLD

## (undated) DEVICE — STAPLER 60 RELOAD BLUE: Brand: SUREFORM

## (undated) DEVICE — ENDO KIT: Brand: MEDLINE INDUSTRIES, INC.

## (undated) DEVICE — GLOVE ORANGE PI 8   MSG9080

## (undated) DEVICE — SOLUTION IV 1000ML 0.45% SOD CHL PH 5 INJ USP VIAFLX PLAS

## (undated) DEVICE — CHLORAPREP 26ML ORANGE

## (undated) DEVICE — GLOVE ORTHO 7 1/2   MSG9475

## (undated) DEVICE — GAUZE,SPONGE,8"X4",12PLY,XRAY,STRL,LF: Brand: MEDLINE

## (undated) DEVICE — TROCAR: Brand: KII FIOS FIRST ENTRY

## (undated) DEVICE — SET LNR RED GRN W/ BASE CLEANASCOPE

## (undated) DEVICE — STAPLER 60: Brand: SUREFORM

## (undated) DEVICE — PAD,ABDOMINAL,5"X9",ST,LF,25/BX: Brand: MEDLINE INDUSTRIES, INC.

## (undated) DEVICE — [HIGH FLOW INSUFFLATOR,  DO NOT USE IF PACKAGE IS DAMAGED,  KEEP DRY,  KEEP AWAY FROM SUNLIGHT,  PROTECT FROM HEAT AND RADIOACTIVE SOURCES.]: Brand: PNEUMOSURE

## (undated) DEVICE — COVER ARMBRD W13XL28.5IN IMPERV BLU FOR OP RM

## (undated) DEVICE — PACK,UNIVERSAL,NO GOWNS: Brand: MEDLINE

## (undated) DEVICE — PACK PROCEDURE SURG SET UP SRMC

## (undated) DEVICE — BREAST HERNIA PACK: Brand: MEDLINE INDUSTRIES, INC.

## (undated) DEVICE — SOLUTION ANTIFOG VIS SYS CLEARIFY LAPSCP

## (undated) DEVICE — TETRA-FLEX CF WOVEN LATEX FREE ELASTIC BANDAGE 6" X 5.5 YD: Brand: TETRA-FLEX™CF

## (undated) DEVICE — BANDAGE ADH W1XL3IN NAT FAB WVN FLX DURABLE N ADH PD SEAL

## (undated) DEVICE — EVACUATOR SURG 100CC SIL BLB SUCT RESVR FOR CLS WND DRNGE

## (undated) DEVICE — SOLUTION SURG PREP POV IOD 7.5% 4 OZ

## (undated) DEVICE — STAPLER 60 RELOAD GREEN: Brand: SUREFORM

## (undated) DEVICE — SYSTEM SKIN CLSR 22CM DERMBND PRINEO

## (undated) DEVICE — 3M™ STERI-STRIP™ COMPOUND BENZOIN TINCTURE 40 BAGS/CARTON 4 CARTONS/CASE C1544: Brand: 3M™ STERI-STRIP™

## (undated) DEVICE — ELECTRO LUBE IS A SINGLE PATIENT USE DEVICE THAT IS INTENDED TO BE USED ON ELECTROSURGICAL ELECTRODES TO REDUCE STICKING.: Brand: KEY SURGICAL ELECTRO LUBE

## (undated) DEVICE — STRIP,CLOSURE,WOUND,MEDI-STRIP,1/2X4: Brand: MEDLINE

## (undated) DEVICE — TIP APPL L35CM RIG FOR SEAL EVICEL

## (undated) DEVICE — GLOVE SURG SZ 65 THK91MIL LTX FREE SYN POLYISOPRENE

## (undated) DEVICE — REDUCER: Brand: ENDOWRIST

## (undated) DEVICE — SET ADMIN 25ML L117IN PMP MOD CK VLV RLER CLMP 2 SMRTSITE

## (undated) DEVICE — SPONGE GZ W4XL4IN COT 12 PLY TYP VII WVN C FLD DSGN

## (undated) DEVICE — PUMP SUC IRR TBNG L10FT W/ HNDPC ASSEMB STRYKEFLOW 2

## (undated) DEVICE — BASIC SINGLE BASIN BTC-LF: Brand: MEDLINE INDUSTRIES, INC.

## (undated) DEVICE — CONNECTOR TBNG AUX H2O JET DISP FOR OLY 160/180 SER

## (undated) DEVICE — SOLUTION IV IRRIG WATER 1000ML POUR BRL 2F7114

## (undated) DEVICE — BINDER ABD 2XL H12XL60 75IN UNISX STD E 4 PNL DISPOSABLE

## (undated) DEVICE — CONMED SCOPE SAVER BITE BLOCK, 20X27 MM: Brand: SCOPE SAVER

## (undated) DEVICE — YANKAUER,BULB TIP,W/O VENT,RIGID,STERILE: Brand: MEDLINE

## (undated) DEVICE — ELECTRODE PT RET AD L9FT HI MOIST COND ADH HYDRGEL CORDED

## (undated) DEVICE — Device

## (undated) DEVICE — PENCIL SMK EVAC ALL IN 1 DSGN ENH VISIBILITY IMPROVED AIR

## (undated) DEVICE — BLADELESS OBTURATOR: Brand: WECK VISTA

## (undated) DEVICE — CANNULA SEAL

## (undated) DEVICE — SUTURE VCRL + SZ 0 L27IN ABSRB UD CT-1 L36MM 1/2 CIR TAPR VCP260H

## (undated) DEVICE — INTENDED FOR TISSUE SEPARATION, AND OTHER PROCEDURES THAT REQUIRE A SHARP SURGICAL BLADE TO PUNCTURE OR CUT.: Brand: BARD-PARKER ® CARBON RIB-BACK BLADES

## (undated) DEVICE — APPLICATOR MEDICATED 26 CC SOLUTION HI LT ORNG CHLORAPREP

## (undated) DEVICE — MARKER,SKIN,WI/RULER AND LABELS: Brand: MEDLINE

## (undated) DEVICE — 450 ML BOTTLE OF 0.05% CHLORHEXIDINE GLUCONATE IN 99.95% STERILE WATER FOR IRRIGATION, USP AND APPLICATOR.: Brand: IRRISEPT ANTIMICROBIAL WOUND LAVAGE

## (undated) DEVICE — TUBING, SUCTION, 1/4" X 20', STRAIGHT: Brand: MEDLINE INDUSTRIES, INC.

## (undated) DEVICE — 35 ML SYRINGE LUER-LOCK TIP: Brand: MONOJECT

## (undated) DEVICE — PREP SOL PVP IODINE 4%  4 OZ/BTL

## (undated) DEVICE — TUBING IV STOPCOCK 48 CM 3 W

## (undated) DEVICE — GLOVE ORANGE PI 7   MSG9070

## (undated) DEVICE — SYRINGE MED 50ML LUERLOCK TIP

## (undated) DEVICE — ARM DRAPE

## (undated) DEVICE — IV START KIT: Brand: MEDLINE INDUSTRIES, INC.

## (undated) DEVICE — AGENT HEMSTAT W4XL8IN OXIDIZED REGENERATED CELOS ABSRB

## (undated) DEVICE — FORCEP RAD JAW W/NEEDLE 160CM

## (undated) DEVICE — TOTAL TRAY, DB, 100% SILI FOLEY, 16FR 10: Brand: MEDLINE

## (undated) DEVICE — HYPODERMIC SAFETY NEEDLE: Brand: MAGELLAN

## (undated) DEVICE — CATHETER ETER IV 22GA L1IN POLYUR STR RADPQ INTROCAN SFTY

## (undated) DEVICE — SUTURE VCRL SZ 2-0 L27IN ABSRB UD L26MM SH 1/2 CIR J417H

## (undated) DEVICE — BLANKET THER AD W24XL60IN FAB COVERING SUP SFT ULT THN LTWT

## (undated) DEVICE — VISIGI 3D®  CALIBRATION SYSTEM  SIZE 36FR STD W/ BULB: Brand: BOEHRINGER® VISIGI 3D™ SLEEVE GASTRECTOMY CALIBRATION SYSTEM, SIZE 36FR W/BULB

## (undated) DEVICE — Z DISCONTINUED BY MEDLINE USE 2711682 TRAY SKIN PREP DRY W/ PREM GLV

## (undated) DEVICE — NEEDLE SPNL L3.5IN PNK HUB S STL REG WALL FIT STYL W/ QNCKE

## (undated) DEVICE — SEAL

## (undated) DEVICE — COLUMN DRAPE